# Patient Record
Sex: MALE | Race: BLACK OR AFRICAN AMERICAN | Employment: FULL TIME
[De-identification: names, ages, dates, MRNs, and addresses within clinical notes are randomized per-mention and may not be internally consistent; named-entity substitution may affect disease eponyms.]

---

## 2017-03-01 ENCOUNTER — OFFICE VISIT (OUTPATIENT)
Dept: INTERNAL MEDICINE | Facility: CLINIC | Age: 22
End: 2017-03-01

## 2017-03-01 VITALS
HEART RATE: 94 BPM | BODY MASS INDEX: 20.27 KG/M2 | SYSTOLIC BLOOD PRESSURE: 120 MMHG | HEIGHT: 70 IN | DIASTOLIC BLOOD PRESSURE: 80 MMHG | WEIGHT: 141.6 LBS

## 2017-03-01 DIAGNOSIS — R10.84 GENERALIZED ABDOMINAL PAIN: Primary | ICD-10-CM

## 2017-03-01 PROCEDURE — 99213 OFFICE O/P EST LOW 20 MIN: CPT | Performed by: INTERNAL MEDICINE

## 2017-03-01 RX ORDER — DICYCLOMINE HYDROCHLORIDE 10 MG/1
10 CAPSULE ORAL 4 TIMES DAILY PRN
Qty: 30 CAPSULE | Refills: 1 | Status: SHIPPED | OUTPATIENT
Start: 2017-03-01 | End: 2018-08-11

## 2017-06-01 ENCOUNTER — OFFICE VISIT (OUTPATIENT)
Dept: INTERNAL MEDICINE | Age: 22
End: 2017-06-01
Payer: COMMERCIAL

## 2017-06-01 VITALS
HEART RATE: 81 BPM | HEIGHT: 71 IN | DIASTOLIC BLOOD PRESSURE: 84 MMHG | SYSTOLIC BLOOD PRESSURE: 120 MMHG | BODY MASS INDEX: 18.62 KG/M2 | WEIGHT: 133 LBS

## 2017-06-01 DIAGNOSIS — F90.2 ATTENTION DEFICIT HYPERACTIVITY DISORDER (ADHD), COMBINED TYPE: ICD-10-CM

## 2017-06-01 DIAGNOSIS — L70.0 ACNE VULGARIS: Primary | ICD-10-CM

## 2017-06-01 PROCEDURE — 99212 OFFICE O/P EST SF 10 MIN: CPT

## 2017-06-01 PROCEDURE — 99213 OFFICE O/P EST LOW 20 MIN: CPT | Performed by: INTERNAL MEDICINE

## 2017-06-01 RX ORDER — CLINDAMYCIN AND BENZOYL PEROXIDE 10; 50 MG/G; MG/G
GEL TOPICAL
Qty: 10 G | Refills: 1 | Status: SHIPPED | OUTPATIENT
Start: 2017-06-01 | End: 2017-08-01 | Stop reason: ALTCHOICE

## 2017-06-01 RX ORDER — CLONIDINE HYDROCHLORIDE 0.2 MG/1
0.2 TABLET ORAL NIGHTLY
COMMUNITY
Start: 2017-05-15 | End: 2020-09-22

## 2017-06-06 ENCOUNTER — TELEPHONE (OUTPATIENT)
Dept: INTERNAL MEDICINE | Age: 22
End: 2017-06-06

## 2017-07-14 ENCOUNTER — OFFICE VISIT (OUTPATIENT)
Dept: INTERNAL MEDICINE | Age: 22
End: 2017-07-14
Payer: COMMERCIAL

## 2017-07-14 VITALS
HEART RATE: 68 BPM | DIASTOLIC BLOOD PRESSURE: 72 MMHG | TEMPERATURE: 98.6 F | WEIGHT: 136 LBS | HEIGHT: 71 IN | BODY MASS INDEX: 19.04 KG/M2 | SYSTOLIC BLOOD PRESSURE: 108 MMHG

## 2017-07-14 DIAGNOSIS — R21 RASH OF NECK: ICD-10-CM

## 2017-07-14 DIAGNOSIS — J06.9 ACUTE URI: Primary | ICD-10-CM

## 2017-07-14 PROCEDURE — 99213 OFFICE O/P EST LOW 20 MIN: CPT | Performed by: INTERNAL MEDICINE

## 2017-07-14 RX ORDER — DIPHENHYDRAMINE HYDROCHLORIDE, ZINC ACETATE 2; .1 G/100G; G/100G
CREAM TOPICAL 2 TIMES DAILY PRN
Qty: 1 TUBE | Refills: 0 | Status: SHIPPED | OUTPATIENT
Start: 2017-07-14 | End: 2020-03-03

## 2017-07-14 RX ORDER — AZITHROMYCIN 250 MG/1
TABLET, FILM COATED ORAL
Qty: 6 TABLET | Refills: 0 | Status: SHIPPED | OUTPATIENT
Start: 2017-07-14 | End: 2017-07-24

## 2017-07-18 ENCOUNTER — OFFICE VISIT (OUTPATIENT)
Dept: INTERNAL MEDICINE | Age: 22
End: 2017-07-18
Payer: COMMERCIAL

## 2017-07-18 VITALS
WEIGHT: 136 LBS | BODY MASS INDEX: 19.47 KG/M2 | SYSTOLIC BLOOD PRESSURE: 117 MMHG | TEMPERATURE: 99.4 F | DIASTOLIC BLOOD PRESSURE: 75 MMHG | HEART RATE: 58 BPM | HEIGHT: 70 IN

## 2017-07-18 DIAGNOSIS — J30.1 SEASONAL ALLERGIC RHINITIS DUE TO POLLEN: ICD-10-CM

## 2017-07-18 DIAGNOSIS — R11.14 BILIOUS VOMITING WITH NAUSEA: ICD-10-CM

## 2017-07-18 DIAGNOSIS — J01.10 ACUTE NON-RECURRENT FRONTAL SINUSITIS: ICD-10-CM

## 2017-07-18 DIAGNOSIS — J06.9 RECENT URI: Primary | ICD-10-CM

## 2017-07-18 PROCEDURE — 99213 OFFICE O/P EST LOW 20 MIN: CPT | Performed by: HOSPITALIST

## 2017-07-18 RX ORDER — ECHINACEA PURPUREA EXTRACT 125 MG
1 TABLET ORAL PRN
Qty: 1 BOTTLE | Refills: 3 | Status: SHIPPED | OUTPATIENT
Start: 2017-07-18 | End: 2020-03-03

## 2017-07-18 RX ORDER — FLUTICASONE PROPIONATE 50 MCG
1 SPRAY, SUSPENSION (ML) NASAL DAILY
Qty: 1 BOTTLE | Refills: 3 | Status: SHIPPED | OUTPATIENT
Start: 2017-07-18 | End: 2021-12-28 | Stop reason: SDUPTHER

## 2017-07-18 RX ORDER — AMOXICILLIN AND CLAVULANATE POTASSIUM 875; 125 MG/1; MG/1
1 TABLET, FILM COATED ORAL 2 TIMES DAILY
Qty: 14 TABLET | Refills: 0 | Status: SHIPPED | OUTPATIENT
Start: 2017-07-18 | End: 2017-07-25

## 2017-07-18 RX ORDER — ONDANSETRON 4 MG/1
4 TABLET, FILM COATED ORAL EVERY 8 HOURS PRN
Qty: 30 TABLET | Refills: 0 | Status: SHIPPED | OUTPATIENT
Start: 2017-07-18 | End: 2018-08-10

## 2017-07-18 RX ORDER — IBUPROFEN 400 MG/1
400 TABLET ORAL EVERY 6 HOURS PRN
Qty: 120 TABLET | Refills: 0 | Status: SHIPPED | OUTPATIENT
Start: 2017-07-18 | End: 2018-08-11

## 2017-08-01 ENCOUNTER — OFFICE VISIT (OUTPATIENT)
Dept: INTERNAL MEDICINE | Age: 22
End: 2017-08-01
Payer: COMMERCIAL

## 2017-08-01 VITALS
WEIGHT: 138 LBS | HEART RATE: 56 BPM | DIASTOLIC BLOOD PRESSURE: 63 MMHG | SYSTOLIC BLOOD PRESSURE: 94 MMHG | BODY MASS INDEX: 19.8 KG/M2
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
## 2017-08-01 PROCEDURE — 99213 OFFICE O/P EST LOW 20 MIN: CPT | Performed by: STUDENT IN AN ORGANIZED HEALTH CARE EDUCATION/TRAINING PROGRAM

## 2017-08-01 RX ORDER — DIPHENHYDRAMINE HCL 25 MG
25 TABLET ORAL 2 TIMES DAILY PRN
Qty: 10 TABLET | Refills: 0 | Status: SHIPPED | OUTPATIENT
Start: 2017-08-01 | End: 2018-08-11

## 2017-08-01 RX ORDER — DIAPER,BRIEF,INFANT-TODD,DISP
EACH MISCELLANEOUS
Qty: 1 TUBE | Refills: 0 | Status: SHIPPED | OUTPATIENT
Start: 2017-08-01 | End: 2017-08-08

## 2017-12-18 ENCOUNTER — TELEPHONE (OUTPATIENT)
Dept: INTERNAL MEDICINE | Age: 22
End: 2017-12-18

## 2018-04-12 ENCOUNTER — HOSPITAL ENCOUNTER (OUTPATIENT)
Age: 23
Setting detail: SPECIMEN
Discharge: HOME | End: 2018-04-12
Payer: COMMERCIAL

## 2018-04-12 ENCOUNTER — OFFICE VISIT (OUTPATIENT)
Dept: INTERNAL MEDICINE | Age: 23
End: 2018-04-12
Payer: COMMERCIAL

## 2018-04-12 VITALS
HEIGHT: 70 IN | DIASTOLIC BLOOD PRESSURE: 90 MMHG | WEIGHT: 143.4 LBS | SYSTOLIC BLOOD PRESSURE: 140 MMHG | BODY MASS INDEX: 20.53 KG/M2 | HEART RATE: 60 BPM

## 2018-04-12 DIAGNOSIS — R19.7 DIARRHEA, UNSPECIFIED TYPE: ICD-10-CM

## 2018-04-12 DIAGNOSIS — R10.33 PERIUMBILICAL ABDOMINAL PAIN: ICD-10-CM

## 2018-04-12 DIAGNOSIS — R10.33 PERIUMBILICAL ABDOMINAL PAIN: Primary | ICD-10-CM

## 2018-04-12 LAB
ALBUMIN SERPL-MCNC: 4.8 G/DL (ref 3.5–5.2)
ALBUMIN/GLOBULIN RATIO: 1.5 (ref 1–2.5)
ALP BLD-CCNC: 66 U/L (ref 40–129)
ALT SERPL-CCNC: 11 U/L (ref 5–41)
ANION GAP SERPL CALCULATED.3IONS-SCNC: 17 MMOL/L (ref 9–17)
AST SERPL-CCNC: 18 U/L
BILIRUB SERPL-MCNC: 0.45 MG/DL (ref 0.3–1.2)
BUN BLDV-MCNC: 10 MG/DL (ref 6–20)
BUN/CREAT BLD: ABNORMAL (ref 9–20)
CALCIUM SERPL-MCNC: 9.2 MG/DL (ref 8.6–10.4)
CHLORIDE BLD-SCNC: 99 MMOL/L (ref 98–107)
CO2: 22 MMOL/L (ref 20–31)
CREAT SERPL-MCNC: 0.92 MG/DL (ref 0.7–1.2)
GFR AFRICAN AMERICAN: >60 ML/MIN
GFR NON-AFRICAN AMERICAN: >60 ML/MIN
GFR SERPL CREATININE-BSD FRML MDRD: ABNORMAL ML/MIN/{1.73_M2}
GFR SERPL CREATININE-BSD FRML MDRD: ABNORMAL ML/MIN/{1.73_M2}
GLUCOSE BLD-MCNC: 69 MG/DL
GLUCOSE BLD-MCNC: 77 MG/DL (ref 70–99)
LIPASE: 22 U/L (ref 13–60)
POTASSIUM SERPL-SCNC: 3.3 MMOL/L (ref 3.7–5.3)
SODIUM BLD-SCNC: 138 MMOL/L (ref 135–144)
TOTAL PROTEIN: 7.9 G/DL (ref 6.4–8.3)

## 2018-04-12 PROCEDURE — G8420 CALC BMI NORM PARAMETERS: HCPCS | Performed by: HOSPITALIST

## 2018-04-12 PROCEDURE — 82962 GLUCOSE BLOOD TEST: CPT | Performed by: HOSPITALIST

## 2018-04-12 PROCEDURE — 36415 COLL VENOUS BLD VENIPUNCTURE: CPT

## 2018-04-12 PROCEDURE — 99213 OFFICE O/P EST LOW 20 MIN: CPT | Performed by: HOSPITALIST

## 2018-04-12 PROCEDURE — 83690 ASSAY OF LIPASE: CPT

## 2018-04-12 PROCEDURE — G8427 DOCREV CUR MEDS BY ELIG CLIN: HCPCS | Performed by: HOSPITALIST

## 2018-04-12 PROCEDURE — 99213 OFFICE O/P EST LOW 20 MIN: CPT

## 2018-04-12 PROCEDURE — 80053 COMPREHEN METABOLIC PANEL: CPT

## 2018-04-12 PROCEDURE — 1036F TOBACCO NON-USER: CPT | Performed by: HOSPITALIST

## 2018-04-12 RX ORDER — CIPROFLOXACIN 500 MG/1
500 TABLET, FILM COATED ORAL 2 TIMES DAILY
Qty: 14 TABLET | Refills: 0 | Status: CANCELLED | OUTPATIENT
Start: 2018-04-12 | End: 2018-04-19

## 2018-04-12 RX ORDER — GREEN TEA/HOODIA GORDONII 315-12.5MG
1 CAPSULE ORAL DAILY
Qty: 30 TABLET | Refills: 0 | Status: SHIPPED | OUTPATIENT
Start: 2018-04-12

## 2018-04-12 ASSESSMENT — PATIENT HEALTH QUESTIONNAIRE - PHQ9
1. LITTLE INTEREST OR PLEASURE IN DOING THINGS: 0
2. FEELING DOWN, DEPRESSED OR HOPELESS: 0
SUM OF ALL RESPONSES TO PHQ9 QUESTIONS 1 & 2: 0
SUM OF ALL RESPONSES TO PHQ QUESTIONS 1-9: 0

## 2018-04-13 ENCOUNTER — HOSPITAL ENCOUNTER (OUTPATIENT)
Age: 23
Setting detail: SPECIMEN
Discharge: HOME | End: 2018-04-13
Payer: COMMERCIAL

## 2018-04-13 LAB — LACTOFERRIN, QUAL: ABNORMAL

## 2018-04-13 PROCEDURE — 87493 C DIFF AMPLIFIED PROBE: CPT

## 2018-04-13 PROCEDURE — 83630 LACTOFERRIN FECAL (QUAL): CPT

## 2018-04-13 PROCEDURE — 87505 NFCT AGENT DETECTION GI: CPT

## 2018-04-14 LAB
C DIFFICILE TOXINS, PCR: NORMAL
SPECIMEN DESCRIPTION: NORMAL

## 2018-04-16 ENCOUNTER — TELEPHONE (OUTPATIENT)
Dept: INTERNAL MEDICINE | Age: 23
End: 2018-04-16

## 2018-04-16 LAB
CAMPYLOBACTER PCR: NORMAL
SALMONELLA PCR: NORMAL
SHIGATOXIN GENE PCR: NORMAL
SHIGELLA SP PCR: NORMAL
SPECIMEN: NORMAL

## 2018-07-24 ENCOUNTER — OFFICE VISIT (OUTPATIENT)
Dept: INTERNAL MEDICINE | Age: 23
End: 2018-07-24
Payer: COMMERCIAL

## 2018-07-24 VITALS
DIASTOLIC BLOOD PRESSURE: 64 MMHG | HEART RATE: 65 BPM | BODY MASS INDEX: 20.83 KG/M2 | SYSTOLIC BLOOD PRESSURE: 117 MMHG | WEIGHT: 145.2 LBS | TEMPERATURE: 98.3 F

## 2018-07-24 DIAGNOSIS — M25.561 ACUTE PAIN OF BOTH KNEES: Primary | ICD-10-CM

## 2018-07-24 DIAGNOSIS — J45.20 MILD INTERMITTENT ASTHMA WITHOUT COMPLICATION: ICD-10-CM

## 2018-07-24 DIAGNOSIS — M25.562 ACUTE PAIN OF BOTH KNEES: Primary | ICD-10-CM

## 2018-07-24 DIAGNOSIS — R30.0 DYSURIA: ICD-10-CM

## 2018-07-24 DIAGNOSIS — R51.9 NONINTRACTABLE HEADACHE, UNSPECIFIED CHRONICITY PATTERN, UNSPECIFIED HEADACHE TYPE: ICD-10-CM

## 2018-07-24 DIAGNOSIS — R10.9 ABDOMINAL PAIN, UNSPECIFIED ABDOMINAL LOCATION: ICD-10-CM

## 2018-07-24 PROCEDURE — 1036F TOBACCO NON-USER: CPT | Performed by: STUDENT IN AN ORGANIZED HEALTH CARE EDUCATION/TRAINING PROGRAM

## 2018-07-24 PROCEDURE — 99214 OFFICE O/P EST MOD 30 MIN: CPT | Performed by: STUDENT IN AN ORGANIZED HEALTH CARE EDUCATION/TRAINING PROGRAM

## 2018-07-24 PROCEDURE — 99213 OFFICE O/P EST LOW 20 MIN: CPT | Performed by: INTERNAL MEDICINE

## 2018-07-24 PROCEDURE — G8427 DOCREV CUR MEDS BY ELIG CLIN: HCPCS | Performed by: STUDENT IN AN ORGANIZED HEALTH CARE EDUCATION/TRAINING PROGRAM

## 2018-07-24 PROCEDURE — G8420 CALC BMI NORM PARAMETERS: HCPCS | Performed by: STUDENT IN AN ORGANIZED HEALTH CARE EDUCATION/TRAINING PROGRAM

## 2018-07-24 RX ORDER — PANTOPRAZOLE SODIUM 20 MG/1
20 TABLET, DELAYED RELEASE ORAL DAILY
Qty: 7 TABLET | Refills: 0 | Status: SHIPPED | OUTPATIENT
Start: 2018-07-24 | End: 2018-07-31 | Stop reason: SDUPTHER

## 2018-07-24 NOTE — PROGRESS NOTES
every other week. He has never used smokeless tobacco.  ETOH:   reports that he occasionally drinks alcohol. Takes about 2 cups of beer on weeekends  DRUGS:  reports that he does not use drugs. OCCUPATION:   at a [employer]. ALLERGIES:    No Known Allergies      HOME MEDICATION:      Current Outpatient Prescriptions on File Prior to Visit   Medication Sig Dispense Refill    diphenhydrAMINE (BENADRYL) 25 MG tablet Take 1 tablet by mouth 2 times daily as needed for Itching 10 tablet 0    fluticasone (FLONASE) 50 MCG/ACT nasal spray 1 spray by Nasal route daily 1 Bottle 3    ibuprofen (ADVIL;MOTRIN) 400 MG tablet Take 1 tablet by mouth every 6 hours as needed for Pain 120 tablet 0    cloNIDine (CATAPRES) 0.2 MG tablet Take 0.2 mg by mouth nightly      benzoyl peroxide 5 % gel Apply topically daily. 60 g 0    amphetamine-dextroamphetamine (ADDERALL XR) 20 MG extended release capsule Take 1 capsule by mouth every morning      Omega-3 Fatty Acids (FISH OIL) 1000 MG CAPS       Lactobacillus (PROBIOTIC ACIDOPHILUS) TABS Take 1 tablet by mouth daily 30 tablet 0    sodium chloride (OCEAN) 0.65 % nasal spray 1 spray by Nasal route as needed for Congestion 1 Bottle 3    ondansetron (ZOFRAN) 4 MG tablet Take 1 tablet by mouth every 8 hours as needed for Nausea or Vomiting 30 tablet 0    diphenhydrAMINE-zinc acetate (BENADRYL EXTRA STRENGTH) 2-0.1 % cream Apply topically 2 times daily as needed for Itching 1 Tube 0    dicyclomine (BENTYL) 10 MG capsule Take 1 capsule by mouth 4 times daily as needed (abdominal pain) 30 capsule 1     No current facility-administered medications on file prior to visit.         FAMILY HISTORY:      Family History   Problem Relation Age of Onset    Asthma Mother     High Blood Pressure Mother     Asthma Maternal Grandmother     Heart Disease Maternal Grandmother     High Blood Pressure Maternal Grandmother     Arthritis Neg Hx     Birth Defects Neg Hx     Cancer Neg Hx     Depression Neg Hx     Diabetes Neg Hx     Early Death Neg Hx     Hearing Loss Neg Hx     Kidney Disease Neg Hx     High Cholesterol Neg Hx     Learning Disabilities Neg Hx     Mental Illness Neg Hx     Mental Retardation Neg Hx     Miscarriages / Stillbirths Neg Hx     Stroke Neg Hx     Substance Abuse Neg Hx     Vision Loss Neg Hx        REVIEW OF SYSTEMS:    · General: Fever and chills. No significant weight changes. · Dermatological: facial and abdominal rash  · Ears/Nose/Throat: denies any hearing loss, abnormal smelling or throat pain  · Respiratory: no cough, pleuritic chest pain, dyspnea, or wheezing  · Cardiovascular: no pain, dyspnea on exertion, orthopnea, palpitations, or claudication  · Gastrointestinal:  Nausea and vomiting, no heartburn, no diarrhea, no constipation, bloating, and sharp constant abdominal pain. No bloody or black stools. · Genito-Urinary: no urinary urgency, no frequency, dysuria, nocturia, hesitancy, incontinence, or pain. No hematuria  · Musculoskeletal: bilateral knee arthralgia, myalgia, weakness, or morning stiffness  · Neurologic: no TIA or stroke symptoms. no paralysis, or frequent or severe headaches  · Hematologic/Lymphatic/Immunologic: no abnormal bleeding/bruising, night sweats orswollen glands.   · Endocrine: no heat or cold intolerance and no polyuria        PHYSICAL EXAM:      Vitals:    07/24/18 1457   BP: 117/64   Site: Right Arm   Position: Sitting   Cuff Size: Small Adult   Pulse: 65   Weight: 145 lb 3.2 oz (65.9 kg)     General appearance - alert, well appearing, and in no distress, oriented to person, place, and time, normal appearing weight and well hydrated  Mental status - normal mood, behavior, speech, dress, motor activity, and thought processes  Eyes - pupils equal and reactive, extraocular eye movements intact  Ears - bilateral TM's and external ear canals normal. Cerumen in both ears R>L  Nose - normal and patent, no erythema, discharge or polyps  Mouth - mucous membranes moist, pharynx normal without lesions  Neck - supple, no significant adenopathy  Lymphatics - no palpable lymphadenopathy, no hepatosplenomegaly  Chest - clear to auscultation, no wheezes, rales or rhonchi, symmetric air entry  Heart - normal rate, regular rhythm, normal S1, S2, no murmurs, rubs, clicks or gallops  Abdomen - soft, nontender, nondistended, no masses or organomegaly  Neurological - alert, oriented, normal speech, no focal findings or movement disorder noted  Musculoskeletal - bilateral knee joint tenderness over patella. ROM normal. Slight limitation by pain  Extremities - peripheral pulses normal, no pedal edema, no clubbing or cyanosis  Skin - sparse papular rash on face and abdomen. LABORATORY FINDINGS:    CBC:   Lab Results   Component Value Date    WBC 3.1 10/11/2016    HGB 13.9 10/11/2016     10/11/2016     2011     BMP:    Lab Results   Component Value Date     2018    K 3.3 2018    CL 99 2018    CO2 22 2018    BUN 10 2018    CREATININE 0.92 2018    GLUCOSE 77 2018    GLUCOSE 72 2011     Hemoglobin A1C: No results found for: LABA1C  Lipid profile:   Lab Results   Component Value Date    CHOL 122 2011    TRIG 65 2011    HDL 36 2011     Thyroid functions:   Lab Results   Component Value Date    TSH 1.15 2012      Hepatic functions:   Lab Results   Component Value Date    ALT 11 2018    AST 18 2018    PROT 7.9 2018    BILITOT 0.45 2018    LABALBU 4.8 2018    LABALBU 4.6 2011     ASSESSMENT AND PLAN:   1. Abdominal pain  · Acute gastritis- PPI  · Possible UTI- Urinalysis, urine culture    2. Bilateral knee pain  · CBC    3. Skin rash  · Hydrocortisone cream 1%    INSTRUCTIONS:   · No Follow-up on file.     · Ally received counseling on the following healthy behaviors: {Health Counselin    · Reviewed prior labs

## 2018-07-25 ENCOUNTER — HOSPITAL ENCOUNTER (OUTPATIENT)
Age: 23
Setting detail: SPECIMEN
Discharge: HOME | End: 2018-07-25
Payer: COMMERCIAL

## 2018-07-25 DIAGNOSIS — R30.0 DYSURIA: ICD-10-CM

## 2018-07-25 DIAGNOSIS — M25.561 ACUTE PAIN OF BOTH KNEES: ICD-10-CM

## 2018-07-25 DIAGNOSIS — M25.562 ACUTE PAIN OF BOTH KNEES: ICD-10-CM

## 2018-07-25 DIAGNOSIS — R10.9 ABDOMINAL PAIN, UNSPECIFIED ABDOMINAL LOCATION: ICD-10-CM

## 2018-07-25 DIAGNOSIS — R51.9 NONINTRACTABLE HEADACHE, UNSPECIFIED CHRONICITY PATTERN, UNSPECIFIED HEADACHE TYPE: ICD-10-CM

## 2018-07-25 LAB
-: NORMAL
ABSOLUTE EOS #: 0.04 K/UL (ref 0–0.44)
ABSOLUTE IMMATURE GRANULOCYTE: <0.03 K/UL (ref 0–0.3)
ABSOLUTE LYMPH #: 0.78 K/UL (ref 1.1–3.7)
ABSOLUTE MONO #: 0.31 K/UL (ref 0.1–1.2)
ALBUMIN SERPL-MCNC: 4.6 G/DL (ref 3.5–5.2)
ALBUMIN/GLOBULIN RATIO: 1.4 (ref 1–2.5)
ALP BLD-CCNC: 66 U/L (ref 40–129)
ALT SERPL-CCNC: 23 U/L (ref 5–41)
AMORPHOUS: NORMAL
AMYLASE: 163 U/L (ref 28–100)
ANION GAP SERPL CALCULATED.3IONS-SCNC: 14 MMOL/L (ref 9–17)
AST SERPL-CCNC: 28 U/L
BACTERIA: NORMAL
BASOPHILS # BLD: 1 % (ref 0–2)
BASOPHILS ABSOLUTE: <0.03 K/UL (ref 0–0.2)
BILIRUB SERPL-MCNC: 0.69 MG/DL (ref 0.3–1.2)
BILIRUBIN DIRECT: 0.16 MG/DL
BILIRUBIN URINE: NEGATIVE
BILIRUBIN, INDIRECT: 0.53 MG/DL (ref 0–1)
BUN BLDV-MCNC: 10 MG/DL (ref 6–20)
BUN/CREAT BLD: NORMAL (ref 9–20)
CALCIUM SERPL-MCNC: 9.4 MG/DL (ref 8.6–10.4)
CASTS UA: NORMAL /LPF (ref 0–8)
CHLORIDE BLD-SCNC: 103 MMOL/L (ref 98–107)
CO2: 23 MMOL/L (ref 20–31)
COLOR: YELLOW
CREAT SERPL-MCNC: 1.04 MG/DL (ref 0.7–1.2)
CRYSTALS, UA: NORMAL /HPF
DIFFERENTIAL TYPE: ABNORMAL
EOSINOPHILS RELATIVE PERCENT: 2 % (ref 1–4)
EPITHELIAL CELLS UA: NORMAL /HPF (ref 0–5)
GFR AFRICAN AMERICAN: >60 ML/MIN
GFR NON-AFRICAN AMERICAN: >60 ML/MIN
GFR SERPL CREATININE-BSD FRML MDRD: NORMAL ML/MIN/{1.73_M2}
GFR SERPL CREATININE-BSD FRML MDRD: NORMAL ML/MIN/{1.73_M2}
GLOBULIN: NORMAL G/DL (ref 1.5–3.8)
GLUCOSE BLD-MCNC: 91 MG/DL (ref 70–99)
GLUCOSE URINE: NEGATIVE
HCT VFR BLD CALC: 42.4 % (ref 40.7–50.3)
HEMOGLOBIN: 13.2 G/DL (ref 13–17)
HIV AG/AB: NONREACTIVE
IMMATURE GRANULOCYTES: 0 %
KETONES, URINE: NEGATIVE
LEUKOCYTE ESTERASE, URINE: NEGATIVE
LYMPHOCYTES # BLD: 29 % (ref 24–43)
MCH RBC QN AUTO: 28.9 PG (ref 25.2–33.5)
MCHC RBC AUTO-ENTMCNC: 31.1 G/DL (ref 28.4–34.8)
MCV RBC AUTO: 93 FL (ref 82.6–102.9)
MONOCYTES # BLD: 11 % (ref 3–12)
MUCUS: NORMAL
NITRITE, URINE: NEGATIVE
NRBC AUTOMATED: 0 PER 100 WBC
OTHER OBSERVATIONS UA: NORMAL
PDW BLD-RTO: 12.5 % (ref 11.8–14.4)
PH UA: 7.5 (ref 5–8)
PLATELET # BLD: 281 K/UL (ref 138–453)
PLATELET ESTIMATE: ABNORMAL
PMV BLD AUTO: 9.6 FL (ref 8.1–13.5)
POTASSIUM SERPL-SCNC: 4.2 MMOL/L (ref 3.7–5.3)
PROTEIN UA: NEGATIVE
RBC # BLD: 4.56 M/UL (ref 4.21–5.77)
RBC # BLD: ABNORMAL 10*6/UL
RBC UA: NORMAL /HPF (ref 0–4)
RENAL EPITHELIAL, UA: NORMAL /HPF
SEDIMENTATION RATE, ERYTHROCYTE: 2 MM (ref 0–10)
SEG NEUTROPHILS: 57 % (ref 36–65)
SEGMENTED NEUTROPHILS ABSOLUTE COUNT: 1.55 K/UL (ref 1.5–8.1)
SODIUM BLD-SCNC: 140 MMOL/L (ref 135–144)
SPECIFIC GRAVITY UA: 1.01 (ref 1–1.03)
T. PALLIDUM, IGG: NONREACTIVE
TOTAL PROTEIN: 7.8 G/DL (ref 6.4–8.3)
TRICHOMONAS: NORMAL
TURBIDITY: CLEAR
URINE HGB: NEGATIVE
UROBILINOGEN, URINE: NORMAL
WBC # BLD: 2.7 K/UL (ref 3.5–11.3)
WBC # BLD: ABNORMAL 10*3/UL
WBC UA: NORMAL /HPF (ref 0–5)
YEAST: NORMAL

## 2018-07-25 PROCEDURE — 36415 COLL VENOUS BLD VENIPUNCTURE: CPT

## 2018-07-25 PROCEDURE — 87491 CHLMYD TRACH DNA AMP PROBE: CPT

## 2018-07-25 PROCEDURE — 87591 N.GONORRHOEAE DNA AMP PROB: CPT

## 2018-07-25 PROCEDURE — 85651 RBC SED RATE NONAUTOMATED: CPT

## 2018-07-25 PROCEDURE — 81001 URINALYSIS AUTO W/SCOPE: CPT

## 2018-07-25 PROCEDURE — 80048 BASIC METABOLIC PNL TOTAL CA: CPT

## 2018-07-25 PROCEDURE — 82150 ASSAY OF AMYLASE: CPT

## 2018-07-25 PROCEDURE — 87389 HIV-1 AG W/HIV-1&-2 AB AG IA: CPT

## 2018-07-25 PROCEDURE — 85025 COMPLETE CBC W/AUTO DIFF WBC: CPT

## 2018-07-25 PROCEDURE — 80076 HEPATIC FUNCTION PANEL: CPT

## 2018-07-25 PROCEDURE — 86780 TREPONEMA PALLIDUM: CPT

## 2018-07-25 PROCEDURE — 87086 URINE CULTURE/COLONY COUNT: CPT

## 2018-07-26 LAB
C. TRACHOMATIS DNA ,URINE: NEGATIVE
N. GONORRHOEAE DNA, URINE: NEGATIVE

## 2018-07-27 LAB
CULTURE: NORMAL
Lab: NORMAL
SPECIMEN DESCRIPTION: NORMAL
STATUS: NORMAL

## 2018-07-31 ENCOUNTER — OFFICE VISIT (OUTPATIENT)
Dept: INTERNAL MEDICINE | Age: 23
End: 2018-07-31
Payer: COMMERCIAL

## 2018-07-31 ENCOUNTER — HOSPITAL ENCOUNTER (OUTPATIENT)
Age: 23
Setting detail: SPECIMEN
Discharge: HOME | End: 2018-07-31
Payer: COMMERCIAL

## 2018-07-31 VITALS
HEART RATE: 59 BPM | BODY MASS INDEX: 19.6 KG/M2 | DIASTOLIC BLOOD PRESSURE: 76 MMHG | SYSTOLIC BLOOD PRESSURE: 127 MMHG | WEIGHT: 136.6 LBS

## 2018-07-31 DIAGNOSIS — R10.9 ABDOMINAL PAIN, UNSPECIFIED ABDOMINAL LOCATION: ICD-10-CM

## 2018-07-31 LAB
AMYLASE: 165 U/L (ref 28–100)
LIPASE: 22 U/L (ref 13–60)

## 2018-07-31 PROCEDURE — 1036F TOBACCO NON-USER: CPT | Performed by: STUDENT IN AN ORGANIZED HEALTH CARE EDUCATION/TRAINING PROGRAM

## 2018-07-31 PROCEDURE — 83690 ASSAY OF LIPASE: CPT

## 2018-07-31 PROCEDURE — 99214 OFFICE O/P EST MOD 30 MIN: CPT | Performed by: STUDENT IN AN ORGANIZED HEALTH CARE EDUCATION/TRAINING PROGRAM

## 2018-07-31 PROCEDURE — G8420 CALC BMI NORM PARAMETERS: HCPCS | Performed by: STUDENT IN AN ORGANIZED HEALTH CARE EDUCATION/TRAINING PROGRAM

## 2018-07-31 PROCEDURE — 36415 COLL VENOUS BLD VENIPUNCTURE: CPT

## 2018-07-31 PROCEDURE — G8427 DOCREV CUR MEDS BY ELIG CLIN: HCPCS | Performed by: STUDENT IN AN ORGANIZED HEALTH CARE EDUCATION/TRAINING PROGRAM

## 2018-07-31 PROCEDURE — 82150 ASSAY OF AMYLASE: CPT

## 2018-07-31 PROCEDURE — 99213 OFFICE O/P EST LOW 20 MIN: CPT | Performed by: INTERNAL MEDICINE

## 2018-07-31 RX ORDER — PANTOPRAZOLE SODIUM 20 MG/1
20 TABLET, DELAYED RELEASE ORAL DAILY
Qty: 14 TABLET | Refills: 0 | Status: SHIPPED | OUTPATIENT
Start: 2018-07-31 | End: 2020-09-22

## 2018-07-31 NOTE — PROGRESS NOTES
[facility]    Progress Note    Date of patient's visit: 7/31/2018  YOB: 1995  Patient's Name:  Aj Alcocer    Patient Care Team:  Josie Galvez MD as PCP - Leyda Dominguez MD as PCP - [facility] Attributed Provider    REASON FOR VISIT: Routine outpatient follow     HISTORY OF PRESENT ILLNESS:    History was obtained from the patient. Aj Alcocer is a 26 y.o. is here for a follow up visit for abdominal pain, bilateral knee pain and fever. He  reports that the abdominal pain has improved somewhat, although he still feels it intermittently. He also states that the fever has resolved and that the knee pain is better but not gone 100%. He presently complains of weight loss which has been ongoing for about a month. He reports a reduction in appetite associated with excessive sleep, loss of energy, but denies loss of interest in his hobbies. He admits to having palpitations and heat intolerance with night sweats. He denies watery stools, cough, chest pain, or sick contacts. His ordered labs from last week show a markedly elevated amylase level.      Patient Active Problem List   Diagnosis    ADHD (attention deficit hyperactivity disorder)    Seasonal allergic rhinitis due to pollen    Mild intermittent asthma without complication       ALLERGIES    No Known Allergies      MEDICATIONS:      Current Outpatient Prescriptions on File Prior to Visit   Medication Sig Dispense Refill    pantoprazole (PROTONIX) 20 MG tablet Take 1 tablet by mouth daily for 7 days 7 tablet 0    Lactobacillus (PROBIOTIC ACIDOPHILUS) TABS Take 1 tablet by mouth daily 30 tablet 0    diphenhydrAMINE (BENADRYL) 25 MG tablet Take 1 tablet by mouth 2 times daily as needed for Itching 10 tablet 0    sodium chloride (OCEAN) 0.65 % nasal spray 1 spray by Nasal route as needed for Congestion 1 Bottle 3    fluticasone (FLONASE) 50 MCG/ACT nasal spray 1 spray by Nasal adenopathy  Chest - clear to auscultation, no wheezes, rales or rhonchi, symmetric air entry  Heart - normal rate, regular rhythm, normal S1, S2, no murmurs, rubs, clicks or gallops  Abdomen - soft, nontender, nondistended, no masses or organomegaly  Musculoskeletal - no joint tenderness, deformity or swelling, full ROM without pain  Extremities - peripheral pulses normal, no pedal edema, no clubbing or cyanosis  Skin - normal coloration and turgor, no rashes, no suspicious skin lesions noted    LABORATORY FINDINGS:    CBC:  Lab Results   Component Value Date    WBC 2.7 07/25/2018    HGB 13.2 07/25/2018     07/25/2018     11/16/2011     BMP:    Lab Results   Component Value Date     07/25/2018    K 4.2 07/25/2018     07/25/2018    CO2 23 07/25/2018    BUN 10 07/25/2018    CREATININE 1.04 07/25/2018    GLUCOSE 91 07/25/2018    GLUCOSE 72 11/16/2011     HEMOGLOBIN A1C: No results found for: LABA1C  MICROALBUMIN URINE: No results found for: MICROALBUR  FASTING LIPID PANEL:  Lab Results   Component Value Date    CHOL 122 11/16/2011    HDL 36 (L) 11/16/2011    TRIG 65 11/16/2011     LIVER PROFILE:  Lab Results   Component Value Date    ALT 23 07/25/2018    AST 28 07/25/2018    PROT 7.8 07/25/2018    BILITOT 0.69 07/25/2018    BILIDIR 0.16 07/25/2018    LABALBU 4.6 07/25/2018    LABALBU 4.6 11/16/2011      THYROID FUNCTION:   Lab Results   Component Value Date    TSH 1.15 07/12/2012      URINE ANALYSIS: No results found for: LABURIN  ASSESSMENT AND PLAN:    1. Abdominal pain -  ·  Likely secondary to pancreatitis. Amylase elevated  ·  on quitting alcohol. 2. Weight loss, palpitations, heat intolerance -   · Possible hyperthyroidism  · TSH      3. Bilateral knee pain -  · Likely secondary to resolving viral infection    4. Depression -  ·         FOLLOW UP:       1. Ally received counseling on the following healthy behaviors: decrease in alcohol consumption  2.   Patient given educational materials - see patient instructions  3. Discussed use, benefit, and side effects of prescribed medications. Barriers to medication compliance addressed. All patient questions answered. Pt voiced understanding. 4.  Reviewed prior labs and health maintenance  5. Continue current medications, diet and exercise.       Patricia Jeronimo MD  PGY-1 Resident  Internal Medicine  [address] [facility]    7/31/2018, 2:12 PM

## 2018-07-31 NOTE — PROGRESS NOTES
Visit Information    Have you changed or started any medications since your last visit including any over-the-counter medicines, vitamins, or herbal medicines? no   Have you stopped taking any of your medications? Is so, why? -  no  Are you having any side effects from any of your medications? - no    Have you seen any other physician or provider since your last visit?  no   Have you had any other diagnostic tests since your last visit? yes    Have you been seen in the emergency room and/or had an admission in a hospital since we last saw you?  no   Have you had your routine dental cleaning in the past 6 months?  no     Do you have an active MyChart account? If no, what is the barrier?   Yes    Patient Care Team:  Jacky Wadsworth MD as PCP - Tunde Pizano MD as PCP - [facility] Attributed Provider    Medical History Review  Past Medical, Family, and Social History reviewed and does contribute to the patient presenting condition    Health Maintenance   Topic Date Due    DTaP/Tdap/Td vaccine (6 - Td) 08/18/2018    Flu vaccine (1) 09/01/2018    Meningococcal (MCV) Vaccine Age 0-22 Years  Completed    Pneumococcal med risk  Completed    HIV screen  Completed

## 2018-07-31 NOTE — PATIENT INSTRUCTIONS
Return To Clinic 8/14/18. After Visit Summary given and reviewed. bb    It is very important for your care that you keep your appointment. If for some reason you are unable to keep your appointment it is equally important that you call our office at [phone] to cancel your appointment and reschedule. Failure to do so may result in your termination from our practice. LABORATORY INSTRUCTIONS    Your doctor has ordered blood or urine testing. You can get this testing done at the Lab located on the first floor of the [facility], or at any other [facility]. Please stop at Main Registration, before going to the lab, as you must be registered first.     Please get this lab done before your next visit.     You may eat or drink before this test.

## 2018-08-10 ENCOUNTER — HOSPITAL ENCOUNTER (EMERGENCY)
Age: 23
Discharge: HOME | End: 2018-08-10
Attending: EMERGENCY MEDICINE
Payer: COMMERCIAL

## 2018-08-10 VITALS
RESPIRATION RATE: 15 BRPM | DIASTOLIC BLOOD PRESSURE: 73 MMHG | TEMPERATURE: 100.2 F | BODY MASS INDEX: 20.95 KG/M2 | WEIGHT: 146 LBS | SYSTOLIC BLOOD PRESSURE: 115 MMHG | OXYGEN SATURATION: 97 % | HEART RATE: 94 BPM

## 2018-08-10 DIAGNOSIS — R19.7 NAUSEA VOMITING AND DIARRHEA: ICD-10-CM

## 2018-08-10 DIAGNOSIS — R11.2 NAUSEA VOMITING AND DIARRHEA: ICD-10-CM

## 2018-08-10 DIAGNOSIS — R10.84 GENERALIZED ABDOMINAL PAIN: Primary | ICD-10-CM

## 2018-08-10 DIAGNOSIS — K52.9 GASTROENTERITIS: ICD-10-CM

## 2018-08-10 LAB
ABSOLUTE EOS #: <0.03 K/UL (ref 0–0.44)
ABSOLUTE IMMATURE GRANULOCYTE: <0.03 K/UL (ref 0–0.3)
ABSOLUTE LYMPH #: 0.77 K/UL (ref 1.1–3.7)
ABSOLUTE MONO #: 0.69 K/UL (ref 0.1–1.2)
ALBUMIN SERPL-MCNC: 4.5 G/DL (ref 3.5–5.2)
ALBUMIN/GLOBULIN RATIO: 1.4 (ref 1–2.5)
ALP BLD-CCNC: 68 U/L (ref 40–129)
ALT SERPL-CCNC: 11 U/L (ref 5–41)
ANION GAP SERPL CALCULATED.3IONS-SCNC: 14 MMOL/L (ref 9–17)
AST SERPL-CCNC: 27 U/L
BASOPHILS # BLD: 0 % (ref 0–2)
BASOPHILS ABSOLUTE: <0.03 K/UL (ref 0–0.2)
BILIRUB SERPL-MCNC: 0.83 MG/DL (ref 0.3–1.2)
BUN BLDV-MCNC: 10 MG/DL (ref 6–20)
BUN/CREAT BLD: ABNORMAL (ref 9–20)
CALCIUM SERPL-MCNC: 8.9 MG/DL (ref 8.6–10.4)
CHLORIDE BLD-SCNC: 104 MMOL/L (ref 98–107)
CO2: 23 MMOL/L (ref 20–31)
CREAT SERPL-MCNC: 0.99 MG/DL (ref 0.7–1.2)
CULTURE: NORMAL
DIFFERENTIAL TYPE: ABNORMAL
EOSINOPHILS RELATIVE PERCENT: 0 % (ref 1–4)
GFR AFRICAN AMERICAN: >60 ML/MIN
GFR NON-AFRICAN AMERICAN: >60 ML/MIN
GFR SERPL CREATININE-BSD FRML MDRD: ABNORMAL ML/MIN/{1.73_M2}
GFR SERPL CREATININE-BSD FRML MDRD: ABNORMAL ML/MIN/{1.73_M2}
GLUCOSE BLD-MCNC: 100 MG/DL (ref 70–99)
HCT VFR BLD CALC: 42.1 % (ref 40.7–50.3)
HEMOGLOBIN: 13.6 G/DL (ref 13–17)
IMMATURE GRANULOCYTES: 0 %
LIPASE: 21 U/L (ref 13–60)
LYMPHOCYTES # BLD: 12 % (ref 24–43)
Lab: NORMAL
MCH RBC QN AUTO: 29.5 PG (ref 25.2–33.5)
MCHC RBC AUTO-ENTMCNC: 32.3 G/DL (ref 28.4–34.8)
MCV RBC AUTO: 91.3 FL (ref 82.6–102.9)
MONOCYTES # BLD: 11 % (ref 3–12)
NRBC AUTOMATED: 0 PER 100 WBC
PDW BLD-RTO: 12 % (ref 11.8–14.4)
PLATELET # BLD: 218 K/UL (ref 138–453)
PLATELET ESTIMATE: ABNORMAL
PMV BLD AUTO: 9.5 FL (ref 8.1–13.5)
POTASSIUM SERPL-SCNC: 3.5 MMOL/L (ref 3.7–5.3)
RBC # BLD: 4.61 M/UL (ref 4.21–5.77)
RBC # BLD: ABNORMAL 10*6/UL
SEG NEUTROPHILS: 77 % (ref 36–65)
SEGMENTED NEUTROPHILS ABSOLUTE COUNT: 4.95 K/UL (ref 1.5–8.1)
SODIUM BLD-SCNC: 141 MMOL/L (ref 135–144)
SPECIMEN DESCRIPTION: NORMAL
STATUS: NORMAL
TOTAL PROTEIN: 7.7 G/DL (ref 6.4–8.3)
WBC # BLD: 6.5 K/UL (ref 3.5–11.3)
WBC # BLD: ABNORMAL 10*3/UL

## 2018-08-10 PROCEDURE — 99284 EMERGENCY DEPT VISIT MOD MDM: CPT

## 2018-08-10 PROCEDURE — 80053 COMPREHEN METABOLIC PANEL: CPT

## 2018-08-10 PROCEDURE — 83690 ASSAY OF LIPASE: CPT

## 2018-08-10 PROCEDURE — 2580000003 HC RX 258: Performed by: EMERGENCY MEDICINE

## 2018-08-10 PROCEDURE — 96374 THER/PROPH/DIAG INJ IV PUSH: CPT

## 2018-08-10 PROCEDURE — 6360000002 HC RX W HCPCS: Performed by: EMERGENCY MEDICINE

## 2018-08-10 PROCEDURE — C9113 INJ PANTOPRAZOLE SODIUM, VIA: HCPCS | Performed by: EMERGENCY MEDICINE

## 2018-08-10 PROCEDURE — 85025 COMPLETE CBC W/AUTO DIFF WBC: CPT

## 2018-08-10 RX ORDER — ONDANSETRON 4 MG/1
4 TABLET, FILM COATED ORAL EVERY 8 HOURS PRN
Qty: 10 TABLET | Refills: 0 | Status: SHIPPED | OUTPATIENT
Start: 2018-08-10 | End: 2020-09-22

## 2018-08-10 RX ORDER — ONDANSETRON 2 MG/ML
4 INJECTION INTRAMUSCULAR; INTRAVENOUS ONCE
Status: COMPLETED | OUTPATIENT
Start: 2018-08-10 | End: 2018-08-10

## 2018-08-10 RX ORDER — PANTOPRAZOLE SODIUM 40 MG/10ML
40 INJECTION, POWDER, LYOPHILIZED, FOR SOLUTION INTRAVENOUS ONCE
Status: COMPLETED | OUTPATIENT
Start: 2018-08-10 | End: 2018-08-10

## 2018-08-10 RX ORDER — KETOROLAC TROMETHAMINE 30 MG/ML
30 INJECTION, SOLUTION INTRAMUSCULAR; INTRAVENOUS ONCE
Status: COMPLETED | OUTPATIENT
Start: 2018-08-10 | End: 2018-08-10

## 2018-08-10 RX ORDER — DICYCLOMINE HYDROCHLORIDE 10 MG/1
10 CAPSULE ORAL EVERY 6 HOURS PRN
Qty: 20 CAPSULE | Refills: 0 | Status: SHIPPED | OUTPATIENT
Start: 2018-08-10 | End: 2018-08-11

## 2018-08-10 RX ORDER — IBUPROFEN 800 MG/1
800 TABLET ORAL EVERY 8 HOURS PRN
Qty: 30 TABLET | Refills: 0 | Status: SHIPPED | OUTPATIENT
Start: 2018-08-10 | End: 2019-03-08

## 2018-08-10 RX ORDER — 0.9 % SODIUM CHLORIDE 0.9 %
10 VIAL (ML) INJECTION ONCE
Status: COMPLETED | OUTPATIENT
Start: 2018-08-10 | End: 2018-08-10

## 2018-08-10 RX ORDER — 0.9 % SODIUM CHLORIDE 0.9 %
1000 INTRAVENOUS SOLUTION INTRAVENOUS ONCE
Status: COMPLETED | OUTPATIENT
Start: 2018-08-10 | End: 2018-08-10

## 2018-08-10 RX ADMIN — ONDANSETRON 4 MG: 2 INJECTION INTRAMUSCULAR; INTRAVENOUS at 09:52

## 2018-08-10 RX ADMIN — SODIUM CHLORIDE 1000 ML: 9 INJECTION, SOLUTION INTRAVENOUS at 09:53

## 2018-08-10 RX ADMIN — Medication 10 ML: at 09:52

## 2018-08-10 RX ADMIN — KETOROLAC TROMETHAMINE 30 MG: 30 INJECTION, SOLUTION INTRAMUSCULAR at 09:52

## 2018-08-10 RX ADMIN — PANTOPRAZOLE SODIUM 40 MG: 40 INJECTION, POWDER, FOR SOLUTION INTRAVENOUS at 09:52

## 2018-08-10 ASSESSMENT — PAIN DESCRIPTION - PAIN TYPE: TYPE: ACUTE PAIN

## 2018-08-10 ASSESSMENT — PAIN DESCRIPTION - DESCRIPTORS: DESCRIPTORS: ACHING

## 2018-08-10 ASSESSMENT — PAIN DESCRIPTION - LOCATION: LOCATION: ABDOMEN

## 2018-08-10 ASSESSMENT — PAIN DESCRIPTION - PROGRESSION: CLINICAL_PROGRESSION: GRADUALLY WORSENING

## 2018-08-10 ASSESSMENT — PAIN DESCRIPTION - ONSET: ONSET: SUDDEN

## 2018-08-10 ASSESSMENT — PAIN SCALES - GENERAL
PAINLEVEL_OUTOF10: 10
PAINLEVEL_OUTOF10: 10

## 2018-08-11 ENCOUNTER — HOSPITAL ENCOUNTER (EMERGENCY)
Age: 23
Discharge: HOME | End: 2018-08-11
Attending: EMERGENCY MEDICINE
Payer: COMMERCIAL

## 2018-08-11 VITALS
RESPIRATION RATE: 16 BRPM | HEART RATE: 52 BPM | OXYGEN SATURATION: 100 % | TEMPERATURE: 96.8 F | SYSTOLIC BLOOD PRESSURE: 106 MMHG | DIASTOLIC BLOOD PRESSURE: 68 MMHG | BODY MASS INDEX: 20.3 KG/M2 | WEIGHT: 145 LBS | HEIGHT: 71 IN
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
## 2018-08-11 PROCEDURE — 6370000000 HC RX 637 (ALT 250 FOR IP): Performed by: EMERGENCY MEDICINE

## 2018-08-11 PROCEDURE — 99282 EMERGENCY DEPT VISIT SF MDM: CPT

## 2018-08-11 RX ORDER — IBUPROFEN 800 MG/1
800 TABLET ORAL ONCE
Status: COMPLETED | OUTPATIENT
Start: 2018-08-11 | End: 2018-08-11

## 2018-08-11 RX ORDER — DIPHENHYDRAMINE HCL 25 MG
25 TABLET ORAL EVERY 6 HOURS PRN
Qty: 30 TABLET | Refills: 0 | Status: SHIPPED | OUTPATIENT
Start: 2018-08-11

## 2018-08-11 RX ORDER — DIPHENHYDRAMINE HCL 25 MG
25 TABLET ORAL ONCE
Status: COMPLETED | OUTPATIENT
Start: 2018-08-11 | End: 2018-08-11

## 2018-08-11 RX ADMIN — DIPHENHYDRAMINE HCL 25 MG: 25 TABLET ORAL at 14:18

## 2018-08-11 RX ADMIN — IBUPROFEN 800 MG: 800 TABLET ORAL at 14:18

## 2018-08-11 ASSESSMENT — PAIN SCALES - GENERAL
PAINLEVEL_OUTOF10: 5
PAINLEVEL_OUTOF10: 10

## 2018-08-11 ASSESSMENT — PAIN DESCRIPTION - ORIENTATION: ORIENTATION: LEFT

## 2018-08-11 ASSESSMENT — PAIN DESCRIPTION - LOCATION: LOCATION: ARM

## 2018-08-11 ASSESSMENT — PAIN DESCRIPTION - PROGRESSION: CLINICAL_PROGRESSION: NOT CHANGED

## 2018-08-11 ASSESSMENT — PAIN DESCRIPTION - PAIN TYPE: TYPE: ACUTE PAIN

## 2018-08-11 NOTE — ED PROVIDER NOTES
and time. He appears well-developed and well-nourished. No distress. HENT:   Head: Normocephalic and atraumatic. Eyes: Pupils are equal, round, and reactive to light. Conjunctivae and EOM are normal.   Neck: Normal range of motion. Neck supple. Cardiovascular: Normal rate, regular rhythm, normal heart sounds and intact distal pulses. Exam reveals no gallop and no friction rub. No murmur heard. Pulmonary/Chest: Effort normal and breath sounds normal. No respiratory distress. He has no wheezes. Abdominal: Soft. Bowel sounds are normal. He exhibits no distension. There is tenderness in the epigastric area, periumbilical area and left upper quadrant. There is no rebound, no guarding and no CVA tenderness. Musculoskeletal: Normal range of motion. He exhibits no edema or tenderness. Neurological: He is alert and oriented to person, place, and time. Skin: Skin is warm and dry. No rash noted. Psychiatric: He has a normal mood and affect.  His behavior is normal.       DIFFERENTIAL  DIAGNOSIS     PLAN (LABS / IMAGING / EKG):  Orders Placed This Encounter   Procedures    Urine Culture    CBC WITH AUTO DIFFERENTIAL    Comprehensive Metabolic Panel    Lipase       MEDICATIONS ORDERED:  Orders Placed This Encounter   Medications    0.9 % sodium chloride bolus    ketorolac (TORADOL) injection 30 mg    ondansetron (ZOFRAN) injection 4 mg    AND Linked Order Group     pantoprazole (PROTONIX) injection 40 mg     sodium chloride (PF) 0.9 % injection 10 mL    dicyclomine (BENTYL) 10 MG capsule     Sig: Take 1 capsule by mouth every 6 hours as needed (cramps)     Dispense:  20 capsule     Refill:  0    ibuprofen (ADVIL;MOTRIN) 800 MG tablet     Sig: Take 1 tablet by mouth every 8 hours as needed for Pain     Dispense:  30 tablet     Refill:  0    ondansetron (ZOFRAN) 4 MG tablet     Sig: Take 1 tablet by mouth every 8 hours as needed for Nausea     Dispense:  10 tablet     Refill:  0       DDX: GERD, PUD, pancreatitis, cholecystitis, GB colic, cholangitis, SBO, DKA, AAA, mesenteric ischemia, perforated viscous, acute gastroenteritis, NSAP, pyelonephritis, kidney stone, appendicitis, hernia, D-TICS, UTI, constipation, testicular torsion, epididymitis/ orchitis    DIAGNOSTIC RESULTS / [address] [place] [address] / [facility]     LABS:  Results for orders placed or performed during the hospital encounter of 08/10/18   Urine Culture   Result Value Ref Range    Specimen Description . CLEAN CATCH URINE     Special Requests NOT REPORTED     Culture PATIENT DISCHARGED BEFORE SAMPLE COLLECTED     Status FINAL 08/10/2018    CBC WITH AUTO DIFFERENTIAL   Result Value Ref Range    WBC 6.5 3.5 - 11.3 k/uL    RBC 4.61 4.21 - 5.77 m/uL    Hemoglobin 13.6 13.0 - 17.0 g/dL    Hematocrit 42.1 40.7 - 50.3 %    MCV 91.3 82.6 - 102.9 fL    MCH 29.5 25.2 - 33.5 pg    MCHC 32.3 28.4 - 34.8 g/dL    RDW 12.0 11.8 - 14.4 %    Platelets 323 831 - 097 k/uL    MPV 9.5 8.1 - 13.5 fL    NRBC Automated 0.0 0.0 per 100 WBC    Differential Type NOT REPORTED     Seg Neutrophils 77 (H) 36 - 65 %    Lymphocytes 12 (L) 24 - 43 %    Monocytes 11 3 - 12 %    Eosinophils % 0 (L) 1 - 4 %    Basophils 0 0 - 2 %    Immature Granulocytes 0 0 %    Segs Absolute 4.95 1.50 - 8.10 k/uL    Absolute Lymph # 0.77 (L) 1.10 - 3.70 k/uL    Absolute Mono # 0.69 0.10 - 1.20 k/uL    Absolute Eos # <0.03 0.00 - 0.44 k/uL    Basophils # <0.03 0.00 - 0.20 k/uL    Absolute Immature Granulocyte <0.03 0.00 - 0.30 k/uL    WBC Morphology NOT REPORTED     RBC Morphology NOT REPORTED     Platelet Estimate NOT REPORTED    Comprehensive Metabolic Panel   Result Value Ref Range    Glucose 100 (H) 70 - 99 mg/dL    BUN 10 6 - 20 mg/dL    CREATININE 0.99 0.70 - 1.20 mg/dL    Bun/Cre Ratio NOT REPORTED 9 - 20    Calcium 8.9 8.6 - 10.4 mg/dL    Sodium 141 135 - 144 mmol/L    Potassium 3.5 (L) 3.7 - 5.3 mmol/L    Chloride 104 98 - 107 mmol/L    CO2 23 20 - 31 mmol/L    Anion Gap 14 9 - 17 mmol/L Alkaline Phosphatase 68 40 - 129 U/L    ALT 11 5 - 41 U/L    AST 27 <40 U/L    Total Bilirubin 0.83 0.3 - 1.2 mg/dL    Total Protein 7.7 6.4 - 8.3 g/dL    Alb 4.5 3.5 - 5.2 g/dL    Albumin/Globulin Ratio 1.4 1.0 - 2.5    GFR Non-African American >60 >60 mL/min    GFR African American >60 >60 mL/min    GFR Comment          GFR Staging NOT REPORTED    Lipase   Result Value Ref Range    Lipase 21 13 - 60 U/L       RADIOLOGY:  None    EKG  None    All EKG's are interpreted by the Emergency Department Physician who either signs or Co-signs this chart in the absence of a cardiologist.    EMERGENCY DEPARTMENT COURSE:  Pt seen and evaluated. He is laying in the bed uncomfortably due to his ongoing nausea and abdominal pain. Abdominal labs ordered. Pt given IVFs, toradol, zofran, and protonix. No acute findigns on lab results. Discussed all findings and treatment plan with pt who voiced understanding and is in agreement with the plan. On re-evaluation, he reported good symptom improvement. PO trial attempted and tolerated well. Pt stable and ready for discharge home with new scripts. PROCEDURES:  None    CONSULTS:  None    CRITICAL CARE:  None    FINAL IMPRESSION      1. Generalized abdominal pain    2. Nausea vomiting and diarrhea    3. Gastroenteritis          DISPOSITION / PLAN     DISPOSITION Decision To Discharge 08/10/2018 12:38:35 PM      PATIENT REFERRED TO:  Romina Gordon MD  [address] [address]  [phone]    Call in 2 days  As needed, If symptoms worsen      DISCHARGE MEDICATIONS:  Discharge Medication List as of 8/10/2018 12:41 PM      START taking these medications    Details   !! dicyclomine (BENTYL) 10 MG capsule Take 1 capsule by mouth every 6 hours as needed (cramps), Disp-20 capsule, R-0Print      !! ibuprofen (ADVIL;MOTRIN) 800 MG tablet Take 1 tablet by mouth every 8 hours as needed for Pain, Disp-30 tablet, R-0Print       !! - Potential duplicate medications found.  Please

## 2018-08-15 ASSESSMENT — ENCOUNTER SYMPTOMS
COUGH: 0
VOMITING: 1
NAUSEA: 1
SHORTNESS OF BREATH: 0
DIARRHEA: 1
SORE THROAT: 0
ABDOMINAL PAIN: 1
EYE PAIN: 0

## 2018-08-16 ASSESSMENT — ENCOUNTER SYMPTOMS
DIARRHEA: 0
COUGH: 0
SORE THROAT: 0
EYE PAIN: 0
NAUSEA: 0
SHORTNESS OF BREATH: 0
ABDOMINAL PAIN: 0

## 2018-08-30 ENCOUNTER — HOSPITAL ENCOUNTER (EMERGENCY)
Age: 23
Discharge: HOME | End: 2018-08-30
Attending: EMERGENCY MEDICINE
Payer: COMMERCIAL

## 2018-08-30 VITALS
RESPIRATION RATE: 14 BRPM | TEMPERATURE: 98.8 F | BODY MASS INDEX: 20.22 KG/M2 | OXYGEN SATURATION: 98 % | DIASTOLIC BLOOD PRESSURE: 77 MMHG | HEART RATE: 83 BPM | WEIGHT: 145 LBS | SYSTOLIC BLOOD PRESSURE: 124 MMHG

## 2018-08-30 DIAGNOSIS — L02.91 ABSCESS: Primary | ICD-10-CM

## 2018-08-30 PROCEDURE — 6360000002 HC RX W HCPCS: Performed by: EMERGENCY MEDICINE

## 2018-08-30 PROCEDURE — 6370000000 HC RX 637 (ALT 250 FOR IP): Performed by: EMERGENCY MEDICINE

## 2018-08-30 PROCEDURE — 99282 EMERGENCY DEPT VISIT SF MDM: CPT

## 2018-08-30 PROCEDURE — 10060 I&D ABSCESS SIMPLE/SINGLE: CPT

## 2018-08-30 PROCEDURE — 96372 THER/PROPH/DIAG INJ SC/IM: CPT

## 2018-08-30 RX ORDER — MORPHINE SULFATE 4 MG/ML
4 INJECTION, SOLUTION INTRAMUSCULAR; INTRAVENOUS ONCE
Status: COMPLETED | OUTPATIENT
Start: 2018-08-30 | End: 2018-08-30

## 2018-08-30 RX ORDER — IBUPROFEN 800 MG/1
800 TABLET ORAL EVERY 8 HOURS PRN
Qty: 30 TABLET | Refills: 0 | Status: SHIPPED | OUTPATIENT
Start: 2018-08-30 | End: 2019-03-08

## 2018-08-30 RX ORDER — DOXYCYCLINE HYCLATE 100 MG
100 TABLET ORAL ONCE
Status: COMPLETED | OUTPATIENT
Start: 2018-08-30 | End: 2018-08-30

## 2018-08-30 RX ORDER — DOXYCYCLINE 100 MG/1
100 TABLET ORAL 2 TIMES DAILY
Qty: 28 TABLET | Refills: 0 | Status: SHIPPED | OUTPATIENT
Start: 2018-08-30 | End: 2018-09-13

## 2018-08-30 RX ADMIN — DOXYCYCLINE HYCLATE 100 MG: 100 TABLET, COATED ORAL at 02:42

## 2018-08-30 RX ADMIN — MORPHINE SULFATE 4 MG: 4 INJECTION INTRAVENOUS at 02:09

## 2018-08-30 ASSESSMENT — ENCOUNTER SYMPTOMS
SHORTNESS OF BREATH: 0
VOMITING: 0
ABDOMINAL PAIN: 0
BACK PAIN: 0
SORE THROAT: 0

## 2018-08-30 ASSESSMENT — PAIN DESCRIPTION - DESCRIPTORS: DESCRIPTORS: DISCOMFORT

## 2018-08-30 ASSESSMENT — PAIN DESCRIPTION - ORIENTATION: ORIENTATION: MID

## 2018-08-30 ASSESSMENT — PAIN SCALES - GENERAL: PAINLEVEL_OUTOF10: 10

## 2018-08-30 ASSESSMENT — PAIN DESCRIPTION - LOCATION: LOCATION: BUTTOCKS

## 2018-08-30 NOTE — ED PROVIDER NOTES
[facility]     Emergency Department     Faculty Attestation    I performed a history and physical examination of the patient and discussed management with the resident. I have reviewed and agree with the residents findings including all diagnostic interpretations, and treatment plans as written. Any areas of disagreement are noted on the chart. I was personally present for the key portions of any procedures. I have documented in the chart those procedures where I was not present during the key portions. I have reviewed the emergency nurses triage note. I agree with the chief complaint, past medical history, past surgical history, allergies, medications, social and family history as documented unless otherwise noted below. Documentation of the HPI, Physical Exam and Medical Decision Making performed by [email or web address] is based on my personal performance of the HPI, PE and MDM. For Physician Assistant/ Nurse Practitioner cases/documentation I have personally evaluated this patient and have completed at least one if not all key elements of the E/M (history, physical exam, and MDM). Additional findings are as noted. 27 yo M l buttock abscess no fever   pe swelling erythema drainage, medial l buttock , vss,   Incised, abx started, vss,     Pre-hypertension/Hypertension: The patient has been informed that they may have pre-hypertension or Hypertension based on a blood pressure reading in the emergency department. I recommend that the patient call the primary care provider listed on their discharge instructions or a physician of their choice this week to arrange follow up for further evaluation of possible pre-hypertension or Hypertension. EKG Interpretation    Interpreted by me      CRITICAL CARE: There was a high probability of clinically significant/life threatening deterioration in this patient's condition which required my urgent intervention.   Total critical

## 2018-08-30 NOTE — ED PROVIDER NOTES
[address]  ED  Emergency Department Encounter  Emergency Medicine Resident     Pt Name: Andrey Conte  MRN: [de-identified]  [place] 1995  Date of evaluation: 8/30/18  PCP:  Jose Cruz Hernandez MD    CHIEF COMPLAINT       Chief Complaint   Patient presents with    Abscess     intergluteal cleft of buttock per pt, bleeding and open       HISTORY OF PRESENT ILLNESS  (Location/Symptom, Timing/Onset, Context/Setting, Quality, Duration, Modifying Factors, Severity.)      Andrey Conte is a 26 y.o. male who presents with Abscess. Patient states he's had an abscess developing on the left gluteus for the past few days. He states now draining. He thinks he may have been bit by an insect. Patient states he's had boils in the past.  Denies any allergies. Denies any pain with defecation, abdominal pain, fevers or chills or vomiting or rashes. Patient has tried Motrin with minimal relief. PAST MEDICAL / SURGICAL / SOCIAL / FAMILY HISTORY      has a past medical history of ADHD (attention deficit hyperactivity disorder); Allergic rhinitis; Allergy; Asthma; Depression; Headache(784.0); Scoliosis of lumbar spine; and Vision abnormalities. has a past surgical history that includes Tonsillectomy. Social History     Social History    Marital status: Single     Spouse name: N/A    Number of children: N/A    Years of education: N/A     Occupational History    Not on file.      Social History Main Topics    Smoking status: Never Smoker    Smokeless tobacco: Never Used    Alcohol use No    Drug use: No    Sexual activity: Not on file     Other Topics Concern    Not on file     Social History Narrative    No narrative on file       Family History   Problem Relation Age of Onset    Asthma Mother     High Blood Pressure Mother     Asthma Maternal Grandmother     Heart Disease Maternal Grandmother     High Blood Pressure Maternal Grandmother     Arthritis Neg Hx     Birth Defects Neg Hx  Cancer Neg Hx     Depression Neg Hx     Diabetes Neg Hx     Early Death Neg Hx     Hearing Loss Neg Hx     Kidney Disease Neg Hx     High Cholesterol Neg Hx     Learning Disabilities Neg Hx     Mental Illness Neg Hx     Mental Retardation Neg Hx     Miscarriages / Stillbirths Neg Hx     Stroke Neg Hx     Substance Abuse Neg Hx     Vision Loss Neg Hx        Allergies:  Patient has no known allergies. Home Medications:  Prior to Admission medications    Medication Sig Start Date End Date Taking?  Authorizing Provider   doxycycline monohydrate (ADOXA) 100 MG tablet Take 1 tablet by mouth 2 times daily for 14 days 8/30/18 9/13/18 Yes Anthony Barker MD   ibuprofen (ADVIL;MOTRIN) 800 MG tablet Take 1 tablet by mouth every 8 hours as needed for Pain 8/30/18  Yes Anthony Barker MD   diphenhydrAMINE (BENADRYL) 25 MG tablet Take 1 tablet by mouth every 6 hours as needed for Itching or Allergies 8/11/18  Yes Avery Watkins MD   ibuprofen (ADVIL;MOTRIN) 800 MG tablet Take 1 tablet by mouth every 8 hours as needed for Pain 8/10/18  Yes Avery Watkins MD   ondansetron (ZOFRAN) 4 MG tablet Take 1 tablet by mouth every 8 hours as needed for Nausea 8/10/18  Yes Avery Watkins MD   pantoprazole (PROTONIX) 20 MG tablet Take 1 tablet by mouth daily for 14 days 7/31/18 8/30/18 Yes Jean Carlos Fonseca MD   Lactobacillus (PROBIOTIC ACIDOPHILUS) TABS Take 1 tablet by mouth daily 4/12/18  Yes Tg Castro MD   fluticasone (FLONASE) 50 MCG/ACT nasal spray 1 spray by Nasal route daily 7/18/17  Yes Rainer Fisher MD   cloNIDine (CATAPRES) 0.2 MG tablet Take 0.2 mg by mouth nightly 5/15/17  Yes Historical Provider, MD   amphetamine-dextroamphetamine (ADDERALL XR) 20 MG extended release capsule Take 1 capsule by mouth every morning 10/17/16  Yes Historical Provider, MD   Omega-3 Fatty Acids (FISH OIL) 1000 MG CAPS  9/15/15  Yes Historical Provider, MD   sodium chloride (OCEAN) 0.65 % nasal spray 1 spray cleft) noted. He is not diaphoretic. Psychiatric: Thought content normal.   Nursing note and vitals reviewed. DIFFERENTIAL  DIAGNOSIS     PLAN (LABS / IMAGING / EKG):  No orders of the defined types were placed in this encounter. MEDICATIONS ORDERED:  Orders Placed This Encounter   Medications    morphine (PF) injection 4 mg    doxycycline hyclate (VIBRA-TABS) tablet 100 mg    doxycycline monohydrate (ADOXA) 100 MG tablet     Sig: Take 1 tablet by mouth 2 times daily for 14 days     Dispense:  28 tablet     Refill:  0    ibuprofen (ADVIL;MOTRIN) 800 MG tablet     Sig: Take 1 tablet by mouth every 8 hours as needed for Pain     Dispense:  30 tablet     Refill:  0       DDX: Abscess, cellulitis    DIAGNOSTIC RESULTS / [address] [place] [address] / [facility]     LABS:  No results found for this visit on 08/30/18. RADIOLOGY:  None    EKG  None    All EKG's are interpreted by the Emergency Department Physician who either signs or Co-signs this chart in the absence of a cardiologist.    [facility]/EMERGENCY DEPARTMENT COURSE:  Patient is a 60-year-old male that presents with abscess. It is draining. Afebrile here. Patient given morphine prior to Bedside incision and drainage performed, see procedure note below. Wound packed. Instructed to change packing once daily and soak in warm soap and water and keep wound clean and use Neosporin. Instructed to follow up with primary doctor and return if symptoms worsen. Patient given scripts for Motrin. Patient in agreement with plan. Patient started on doxycycline. PROCEDURES:  Incision/Drainage  Date/Time: 8/30/2018 5:05 AM  Performed by: Mervin Ryan  Authorized by: Osmany Mendiola     Consent:     Consent obtained:  Verbal    Consent given by:  Patient    Risks discussed:  Pain  Location:     Type:  Abscess    Size:  2x2 cm    Location:  Anogenital    Anogenital location:  Perirectal  Anesthesia (see MAR for exact dosages):      Anesthesia method:  Local

## 2018-08-30 NOTE — ED NOTES
Pt ambulatory to ed with c/o abscess to buttocks, pt states it is in the middle and has been bleeding, is unable to sit down. Pt arrived in NAD. RR even and unlabored, family at bedside. Call light within reach.       Nahed Gómez RN  08/30/18 9426

## 2018-08-30 NOTE — H&P
Pt presents with complaint of a worsening abscess. The abscess is located on the left side of the gluteal cleft. He has had an abscess on his left gluteal cheek approximately 2 years ago. He denies picking at it or trauma to the area. He denies fever, dysuria, and dyschezia. He says the abscess has been draining blood and puss. He denies SOB, chest pain, and abdominal pain.

## 2018-10-01 ENCOUNTER — HOSPITAL ENCOUNTER (EMERGENCY)
Age: 23
Discharge: HOME | End: 2018-10-01
Attending: EMERGENCY MEDICINE
Payer: COMMERCIAL

## 2018-10-01 VITALS
HEIGHT: 71 IN | DIASTOLIC BLOOD PRESSURE: 68 MMHG | BODY MASS INDEX: 20.3 KG/M2 | TEMPERATURE: 98.4 F | HEART RATE: 79 BPM | OXYGEN SATURATION: 100 % | WEIGHT: 145 LBS | RESPIRATION RATE: 12 BRPM | SYSTOLIC BLOOD PRESSURE: 128 MMHG

## 2018-10-01 DIAGNOSIS — R11.2 NON-INTRACTABLE VOMITING WITH NAUSEA, UNSPECIFIED VOMITING TYPE: ICD-10-CM

## 2018-10-01 DIAGNOSIS — R51.9 NONINTRACTABLE EPISODIC HEADACHE, UNSPECIFIED HEADACHE TYPE: Primary | ICD-10-CM

## 2018-10-01 PROCEDURE — 99283 EMERGENCY DEPT VISIT LOW MDM: CPT

## 2018-10-01 PROCEDURE — 6370000000 HC RX 637 (ALT 250 FOR IP): Performed by: STUDENT IN AN ORGANIZED HEALTH CARE EDUCATION/TRAINING PROGRAM

## 2018-10-01 PROCEDURE — 6360000002 HC RX W HCPCS: Performed by: STUDENT IN AN ORGANIZED HEALTH CARE EDUCATION/TRAINING PROGRAM

## 2018-10-01 PROCEDURE — 96374 THER/PROPH/DIAG INJ IV PUSH: CPT

## 2018-10-01 PROCEDURE — 2580000003 HC RX 258: Performed by: STUDENT IN AN ORGANIZED HEALTH CARE EDUCATION/TRAINING PROGRAM

## 2018-10-01 RX ORDER — 0.9 % SODIUM CHLORIDE 0.9 %
1000 INTRAVENOUS SOLUTION INTRAVENOUS ONCE
Status: COMPLETED | OUTPATIENT
Start: 2018-10-01 | End: 2018-10-01

## 2018-10-01 RX ORDER — DIPHENHYDRAMINE HCL 25 MG
25 TABLET ORAL ONCE
Status: COMPLETED | OUTPATIENT
Start: 2018-10-01 | End: 2018-10-01

## 2018-10-01 RX ADMIN — PROCHLORPERAZINE EDISYLATE 10 MG: 5 INJECTION INTRAMUSCULAR; INTRAVENOUS at 01:18

## 2018-10-01 RX ADMIN — SODIUM CHLORIDE 1000 ML: 9 INJECTION, SOLUTION INTRAVENOUS at 01:18

## 2018-10-01 RX ADMIN — DIPHENHYDRAMINE HCL 25 MG: 25 TABLET ORAL at 01:18

## 2018-10-01 ASSESSMENT — ENCOUNTER SYMPTOMS
SHORTNESS OF BREATH: 0
ABDOMINAL PAIN: 0
SORE THROAT: 0
VOMITING: 1
NAUSEA: 1
PHOTOPHOBIA: 0

## 2018-10-01 ASSESSMENT — PAIN DESCRIPTION - PAIN TYPE: TYPE: ACUTE PAIN

## 2018-10-01 ASSESSMENT — PAIN SCALES - GENERAL: PAINLEVEL_OUTOF10: 10

## 2018-10-01 ASSESSMENT — PAIN DESCRIPTION - LOCATION: LOCATION: HEAD

## 2018-10-01 NOTE — ED PROVIDER NOTES
Sexual activity: Not on file     Other Topics Concern    Not on file     Social History Narrative    No narrative on file       Family History   Problem Relation Age of Onset    Asthma Mother     High Blood Pressure Mother     Asthma Maternal Grandmother     Heart Disease Maternal Grandmother     High Blood Pressure Maternal Grandmother     Arthritis Neg Hx     Birth Defects Neg Hx     Cancer Neg Hx     Depression Neg Hx     Diabetes Neg Hx     Early Death Neg Hx     Hearing Loss Neg Hx     Kidney Disease Neg Hx     High Cholesterol Neg Hx     Learning Disabilities Neg Hx     Mental Illness Neg Hx     Mental Retardation Neg Hx     Miscarriages / Stillbirths Neg Hx     Stroke Neg Hx     Substance Abuse Neg Hx     Vision Loss Neg Hx        Allergies:  Patient has no known allergies. Home Medications:  Prior to Admission medications    Medication Sig Start Date End Date Taking?  Authorizing Provider   ibuprofen (ADVIL;MOTRIN) 800 MG tablet Take 1 tablet by mouth every 8 hours as needed for Pain 8/30/18   Bryant Haynes MD   diphenhydrAMINE (BENADRYL) 25 MG tablet Take 1 tablet by mouth every 6 hours as needed for Itching or Allergies 8/11/18   Em Palacios MD   ibuprofen (ADVIL;MOTRIN) 800 MG tablet Take 1 tablet by mouth every 8 hours as needed for Pain 8/10/18   Em Palacios MD   ondansetron (ZOFRAN) 4 MG tablet Take 1 tablet by mouth every 8 hours as needed for Nausea 8/10/18   Em Palacios MD   pantoprazole (PROTONIX) 20 MG tablet Take 1 tablet by mouth daily for 14 days 7/31/18 8/30/18  Hansel Mcdaniels MD   Lactobacillus (PROBIOTIC ACIDOPHILUS) TABS Take 1 tablet by mouth daily 4/12/18   Rambo Nicole MD   sodium chloride (OCEAN) 0.65 % nasal spray 1 spray by Nasal route as needed for Congestion 7/18/17   Rainer Sarmiento MD   fluticasone (FLONASE) 50 MCG/ACT nasal spray 1 spray by Nasal route daily 7/18/17   Rainer Sarmiento MD   diphenhydrAMINE-zinc acetate (BENADRYL EXTRA STRENGTH) 2-0.1 % cream Apply topically 2 times daily as needed for Itching 7/14/17   Rainer Pressley MD   cloNIDine (CATAPRES) 0.2 MG tablet Take 0.2 mg by mouth nightly 5/15/17   Historical Provider, MD   benzoyl peroxide 5 % gel Apply topically daily. 6/1/17   Sanju Samosn MD   amphetamine-dextroamphetamine (ADDERALL XR) 20 MG extended release capsule Take 1 capsule by mouth every morning 10/17/16   Historical Provider, MD   Omega-3 Fatty Acids (FISH OIL) 1000 MG CAPS  9/15/15   Historical Provider, MD       REVIEW OF SYSTEMS    (2-9 systems for level 4, 10 or more for level 5)      Review of Systems   Constitutional: Negative for chills and fever. HENT: Negative for congestion and sore throat. Eyes: Negative for photophobia and visual disturbance. Respiratory: Negative for shortness of breath. Cardiovascular: Negative for chest pain. Gastrointestinal: Positive for nausea and vomiting. Negative for abdominal pain. Musculoskeletal: Negative for neck pain and neck stiffness. Neurological: Positive for headaches. Negative for weakness. PHYSICAL EXAM   (up to 7 for level 4, 8 or more for level 5)      INITIAL VITALS:   /68   Pulse 79   Temp 98.4 °F (36.9 °C) (Oral)   Ht 5' 11\" (1.803 m)   Wt 145 lb (65.8 kg)   SpO2 100%   BMI 20.22 kg/m²     Physical Exam   Constitutional: He is oriented to person, place, and time. He appears well-developed. No distress. HENT:   Head: Normocephalic and atraumatic. Eyes: Pupils are equal, round, and reactive to light. EOM are normal.   Neck: Normal range of motion. Neck supple. No signs of meningismus on exam.   Cardiovascular: Normal rate and regular rhythm. Pulmonary/Chest: Effort normal. No stridor. No respiratory distress. Abdominal: Soft. He exhibits no distension. There is no tenderness. Neurological: He is alert and oriented to person, place, and time.    Unremarkable neurologic exam.       DIFFERENTIAL specified.     DISCHARGE MEDICATIONS:  New Prescriptions    No medications on file       Calderon Eller DO  Emergency Medicine Resident    (Please note that portions of this note were completed with a voice recognition program.  Efforts were made to edit the dictations but occasionally words are mis-transcribed.)        Calderon Eller DO  10/01/18 5478

## 2018-10-01 NOTE — ED NOTES
Patient back to room, Dr. Fletcher Chittenden at bedside for evaluation.       Rebecca Leyden, RN  10/01/18 8017

## 2018-10-02 ENCOUNTER — CARE COORDINATION (OUTPATIENT)
Dept: CARE COORDINATION | Age: 23
End: 2018-10-02

## 2018-10-02 NOTE — CARE COORDINATION
Patient was called to follow up with most recent ER visit. There was no answer. A message was left on voicemail to have patient call back regarding ER visit. Office number given [phone].

## 2019-03-08 ENCOUNTER — HOSPITAL ENCOUNTER (EMERGENCY)
Age: 24
Discharge: HOME | End: 2019-03-08
Attending: EMERGENCY MEDICINE
Payer: COMMERCIAL

## 2019-03-08 ENCOUNTER — APPOINTMENT (OUTPATIENT)
Dept: GENERAL RADIOLOGY | Age: 24
End: 2019-03-08
Payer: COMMERCIAL

## 2019-03-08 VITALS
DIASTOLIC BLOOD PRESSURE: 82 MMHG | HEART RATE: 64 BPM | RESPIRATION RATE: 16 BRPM | TEMPERATURE: 97.9 F | OXYGEN SATURATION: 100 % | SYSTOLIC BLOOD PRESSURE: 117 MMHG

## 2019-03-08 DIAGNOSIS — M76.62 ACHILLES TENDINITIS OF LEFT LOWER EXTREMITY: Primary | ICD-10-CM

## 2019-03-08 PROCEDURE — 6360000002 HC RX W HCPCS: Performed by: PHYSICIAN ASSISTANT

## 2019-03-08 PROCEDURE — 96372 THER/PROPH/DIAG INJ SC/IM: CPT

## 2019-03-08 PROCEDURE — 99283 EMERGENCY DEPT VISIT LOW MDM: CPT

## 2019-03-08 PROCEDURE — 73630 X-RAY EXAM OF FOOT: CPT

## 2019-03-08 PROCEDURE — 73610 X-RAY EXAM OF ANKLE: CPT

## 2019-03-08 RX ORDER — IBUPROFEN 800 MG/1
800 TABLET ORAL EVERY 6 HOURS PRN
Qty: 20 TABLET | Refills: 0 | Status: SHIPPED | OUTPATIENT
Start: 2019-03-08 | End: 2020-09-22

## 2019-03-08 RX ORDER — KETOROLAC TROMETHAMINE 15 MG/ML
15 INJECTION, SOLUTION INTRAMUSCULAR; INTRAVENOUS ONCE
Status: COMPLETED | OUTPATIENT
Start: 2019-03-08 | End: 2019-03-08

## 2019-03-08 RX ADMIN — KETOROLAC TROMETHAMINE 15 MG: 15 INJECTION, SOLUTION INTRAMUSCULAR; INTRAVENOUS at 22:30

## 2019-03-08 ASSESSMENT — PAIN SCALES - GENERAL: PAINLEVEL_OUTOF10: 10

## 2019-03-09 ASSESSMENT — ENCOUNTER SYMPTOMS
VOMITING: 0
SORE THROAT: 0
COUGH: 0
COLOR CHANGE: 0
DIARRHEA: 0
NAUSEA: 0
ABDOMINAL PAIN: 0
BACK PAIN: 0
SHORTNESS OF BREATH: 0

## 2019-03-11 ENCOUNTER — OFFICE VISIT (OUTPATIENT)
Dept: PRIMARY CARE CLINIC | Age: 24
End: 2019-03-11
Payer: COMMERCIAL

## 2019-03-11 VITALS
SYSTOLIC BLOOD PRESSURE: 127 MMHG | BODY MASS INDEX: 21.34 KG/M2 | HEART RATE: 61 BPM | DIASTOLIC BLOOD PRESSURE: 77 MMHG | WEIGHT: 153 LBS | TEMPERATURE: 97.9 F | OXYGEN SATURATION: 99 %

## 2019-03-11 DIAGNOSIS — M76.62 ACHILLES TENDINITIS OF LEFT LOWER EXTREMITY: Primary | ICD-10-CM

## 2019-03-11 DIAGNOSIS — M21.41 PES PLANUS OF BOTH FEET: ICD-10-CM

## 2019-03-11 DIAGNOSIS — M21.42 PES PLANUS OF BOTH FEET: ICD-10-CM

## 2019-03-11 DIAGNOSIS — Z23 FLU VACCINE NEED: ICD-10-CM

## 2019-03-11 PROCEDURE — 90688 IIV4 VACCINE SPLT 0.5 ML IM: CPT | Performed by: INTERNAL MEDICINE

## 2019-03-11 PROCEDURE — 90471 IMMUNIZATION ADMIN: CPT | Performed by: INTERNAL MEDICINE

## 2019-03-11 PROCEDURE — 1036F TOBACCO NON-USER: CPT | Performed by: INTERNAL MEDICINE

## 2019-03-11 PROCEDURE — G8427 DOCREV CUR MEDS BY ELIG CLIN: HCPCS | Performed by: INTERNAL MEDICINE

## 2019-03-11 PROCEDURE — G8482 FLU IMMUNIZE ORDER/ADMIN: HCPCS | Performed by: INTERNAL MEDICINE

## 2019-03-11 PROCEDURE — G8420 CALC BMI NORM PARAMETERS: HCPCS | Performed by: INTERNAL MEDICINE

## 2019-03-11 PROCEDURE — 99213 OFFICE O/P EST LOW 20 MIN: CPT | Performed by: INTERNAL MEDICINE

## 2019-03-11 RX ORDER — NAPROXEN 500 MG/1
500 TABLET ORAL 2 TIMES DAILY WITH MEALS
Qty: 60 TABLET | Refills: 0 | Status: SHIPPED | OUTPATIENT
Start: 2019-03-11 | End: 2020-03-03

## 2019-03-11 ASSESSMENT — PATIENT HEALTH QUESTIONNAIRE - PHQ9
SUM OF ALL RESPONSES TO PHQ QUESTIONS 1-9: 0
SUM OF ALL RESPONSES TO PHQ9 QUESTIONS 1 & 2: 0
1. LITTLE INTEREST OR PLEASURE IN DOING THINGS: 0
SUM OF ALL RESPONSES TO PHQ QUESTIONS 1-9: 0
2. FEELING DOWN, DEPRESSED OR HOPELESS: 0

## 2019-03-12 ENCOUNTER — OFFICE VISIT (OUTPATIENT)
Dept: ORTHOPEDIC SURGERY | Age: 24
End: 2019-03-12
Payer: COMMERCIAL

## 2019-03-12 VITALS — HEIGHT: 71 IN | BODY MASS INDEX: 21.42 KG/M2 | WEIGHT: 153 LBS

## 2019-03-12 DIAGNOSIS — M21.41 PES PLANUS OF BOTH FEET: ICD-10-CM

## 2019-03-12 DIAGNOSIS — M21.42 PES PLANUS OF BOTH FEET: ICD-10-CM

## 2019-03-12 DIAGNOSIS — S90.32XA CONTUSION OF LEFT FOOT, INITIAL ENCOUNTER: Primary | ICD-10-CM

## 2019-03-12 PROCEDURE — G8420 CALC BMI NORM PARAMETERS: HCPCS | Performed by: ORTHOPAEDIC SURGERY

## 2019-03-12 PROCEDURE — 99203 OFFICE O/P NEW LOW 30 MIN: CPT | Performed by: ORTHOPAEDIC SURGERY

## 2019-03-12 PROCEDURE — G8427 DOCREV CUR MEDS BY ELIG CLIN: HCPCS | Performed by: ORTHOPAEDIC SURGERY

## 2019-03-12 PROCEDURE — G8482 FLU IMMUNIZE ORDER/ADMIN: HCPCS | Performed by: ORTHOPAEDIC SURGERY

## 2019-03-12 PROCEDURE — 1036F TOBACCO NON-USER: CPT | Performed by: ORTHOPAEDIC SURGERY

## 2019-03-12 ASSESSMENT — ENCOUNTER SYMPTOMS
ABDOMINAL PAIN: 0
SHORTNESS OF BREATH: 0
EYE PAIN: 0

## 2019-03-14 ENCOUNTER — OFFICE VISIT (OUTPATIENT)
Dept: INTERNAL MEDICINE | Age: 24
End: 2019-03-14
Payer: COMMERCIAL

## 2019-03-14 VITALS
HEIGHT: 71 IN | DIASTOLIC BLOOD PRESSURE: 86 MMHG | WEIGHT: 152 LBS | BODY MASS INDEX: 21.28 KG/M2 | SYSTOLIC BLOOD PRESSURE: 134 MMHG | HEART RATE: 58 BPM

## 2019-03-14 DIAGNOSIS — M21.42 PES PLANUS OF LEFT FOOT: ICD-10-CM

## 2019-03-14 DIAGNOSIS — Z23 NEED FOR TDAP VACCINATION: Primary | ICD-10-CM

## 2019-03-14 PROCEDURE — G8482 FLU IMMUNIZE ORDER/ADMIN: HCPCS | Performed by: STUDENT IN AN ORGANIZED HEALTH CARE EDUCATION/TRAINING PROGRAM

## 2019-03-14 PROCEDURE — 99211 OFF/OP EST MAY X REQ PHY/QHP: CPT | Performed by: INTERNAL MEDICINE

## 2019-03-14 PROCEDURE — G8420 CALC BMI NORM PARAMETERS: HCPCS | Performed by: STUDENT IN AN ORGANIZED HEALTH CARE EDUCATION/TRAINING PROGRAM

## 2019-03-14 PROCEDURE — 90715 TDAP VACCINE 7 YRS/> IM: CPT | Performed by: INTERNAL MEDICINE

## 2019-03-14 PROCEDURE — 99213 OFFICE O/P EST LOW 20 MIN: CPT | Performed by: STUDENT IN AN ORGANIZED HEALTH CARE EDUCATION/TRAINING PROGRAM

## 2019-03-14 PROCEDURE — 1036F TOBACCO NON-USER: CPT | Performed by: STUDENT IN AN ORGANIZED HEALTH CARE EDUCATION/TRAINING PROGRAM

## 2019-03-14 PROCEDURE — G8427 DOCREV CUR MEDS BY ELIG CLIN: HCPCS | Performed by: STUDENT IN AN ORGANIZED HEALTH CARE EDUCATION/TRAINING PROGRAM

## 2019-03-26 ENCOUNTER — HOSPITAL ENCOUNTER (OUTPATIENT)
Dept: PHYSICAL THERAPY | Age: 24
Setting detail: THERAPIES SERIES
Discharge: HOME | End: 2019-03-26
Payer: COMMERCIAL

## 2019-03-26 PROCEDURE — 97161 PT EVAL LOW COMPLEX 20 MIN: CPT

## 2019-03-26 PROCEDURE — 97110 THERAPEUTIC EXERCISES: CPT

## 2019-04-02 ENCOUNTER — HOSPITAL ENCOUNTER (OUTPATIENT)
Dept: PHYSICAL THERAPY | Age: 24
Setting detail: THERAPIES SERIES
Discharge: HOME | End: 2019-04-02
Payer: COMMERCIAL

## 2019-04-02 PROCEDURE — 97110 THERAPEUTIC EXERCISES: CPT

## 2019-04-02 NOTE — FLOWSHEET NOTE
[x] [facility] &  Therapy  [address]  [phone]  F: [phone] [] [address]  [facility]   [address]  P: [phone]  F: [phone] [] [address]  [address] [facility]  P: [phone]  F: [phone] [] [address]  [facility]   [address]  [place]: [phone]  F: [phone]     Physical Therapy Daily Treatment Note    Date:  2019  Patient Name:  Luciano Median    :  1995  MRN: [id number]  Patient: Luciano Median               : 1995                      MRN: [id number]  Physician: Dr. Childers Jam: Luisito Ribeiro Diagnosis: Contusion of left foot                 Rehab Codes: M25.572, R53.1, M25.672, R26.9  Onset date: 19                 Next Dr's appt. : 19  Visit# / total visits: 2 Cancels/No Shows: 0/0    Subjective:    Pain:  [x] Yes  [] No Location: Left foot Pain Rating: (0-10 scale) 0/10  Pain altered Tx:  [] No  [] Yes  Action:  Comments: Patient reports no pain in left foot or difficulty ambulating.      Objective:  Modalities:   Precautions:  Exercises:  Exercise Reps/ Time Weight/ Level Comments   SCIFIT 5 min L5          Standing      Gastroc wedge stretch 3x30\"     Step stretch 3x30\"     Step up 20x 12 in     Squats 20x     Lunges 20x     SL Calf raise 2x20x     SLS  3x30\"     SLS with ball toss 20x 7 in          Seated       Leg press 3x10x 70 lb    4 way ankle  20x ea black                Other:     Specific Instructions for next treatment:PF stretching/strengthening, TG heel raises, ankle strengthening all planes in pain/free motion; test for MWM posterior fibular glide to assess for decreased malleolar pain      Treatment Charges: Mins Units   []  Modalitie     [x] Ther Exercise 40 3   []  Manual Therapy     []  Ther Activities     []  Aquatics     []  Vasocompression     []  Other     Total Treatment time 40 3       Assessment: [x] Progressing toward goals. Good tolerance to all exercise, no pain and limited fatigue. [] No change. [] Other:    Short Term Goals: MEET IN 12 VISITS Status   Pain: Pt will report 0/10 pain at rest and with ambulation and typical daily activity. New   ROM: Pt will improve left ankle AROM to the following degrees without pain at end range for improve gait mechanics and reduced risk for re-injury of foot/ankle: DF to 20deg, PF to 65deg,  New   Strength: Pt will be able to demonstrate 5-/5 strength in all planes of left ankle in order to improve push off strength required for efficient gait. New   Strength: Pt will be able to perform 25 single-limb heel raises on LLE with minimal use of UEs for balance to indicate improved functional PF strength and improvement in condition. New   Function: Pt will be able to ambulate for up to 8 minutes with tennis shoes on with normalized gait mechanics and no pain. New   Outcome Measure: Pt will score greater than or equal to 40% on the LEFS in order to demonstrate improved function of the LLE. New   HEP: Pt will be independent in HEP completion. New            Pt. Education:  [x] Yes  [] No  [x] Reviewed Prior HEP/Ed, new exercises listed above  Method of Education: [x] Verbal  [x] Demo  [] Written  Comprehension of Education:  [x] Verbalizes understanding. [x] Demonstrates understanding. [x] Needs review. [] Demonstrates/verbalizes HEP/Ed previously given. Plan: [x] Continue per plan of care.    [] Other:      Time In:1635          Time Out: 8545    Electronically signed by:  Negra Daley PT

## 2019-04-04 ENCOUNTER — HOSPITAL ENCOUNTER (OUTPATIENT)
Dept: PHYSICAL THERAPY | Age: 24
Setting detail: THERAPIES SERIES
Discharge: HOME | End: 2019-04-04
Payer: COMMERCIAL

## 2019-04-04 PROCEDURE — 97110 THERAPEUTIC EXERCISES: CPT

## 2019-04-04 NOTE — FLOWSHEET NOTE
[x] [facility]) [facility]  [address]  [phone]  F: [phone] [] [address]  [place] [address]   [address]  P: [phone]  F: [phone] [] [place]  [address]  P: [phone]  F: [phone] [] [address]  [facility]   [address]  [place]: [phone]  F: [phone]     Physical Therapy Daily Treatment Note    Date:  2019  Patient Name:  Luciano Median    :  1995  MRN: [id number]  Patient: Luciano Median               : 1995                      MRN: [id number]  Physician: Dr. Childers Jam: Luisito Ribeiro Diagnosis: Contusion of left foot                 Rehab Codes: M25.572, R53.1, M25.672, R26.9  Onset date: 19                 Next 's appt. : 19  Visit# / total visits: 3/ Cancels/No Shows: 0/0    Subjective:    Pain:  [x] Yes  [] No Location: Left foot Pain Rating: (0-10 scale) 0/10  Pain altered Tx:  [] No  [] Yes  Action:  Comments: Patient reports no pain or changes since last visit     Objective:  Modalities:   Precautions:  Exercises:  Exercise Reps/ Time Weight/ Level Comments   SCIFIT  L5    Stationary bike 8 min           Standing      Gastroc wedge stretch 3x30\"     Step stretch 3x30\"     Step up 20x 12 in     Squats on reverse BOSU ball 20x     Lunges on BOSU 20x     BOSU ball SL step ups 20x           SL Calf raise 2x20x     SLS  3x30\"     SLS with ball toss 20x2 2.2 lb    SLS rebounder foam 20x2 2.2 lb          Seated       Leg press 3x10x 70 lb    SL leg press 2x10 30 lb    4 way ankle  20x ea black                Other:     Specific Instructions for next treatment:PF stretching/strengthening, TG heel raises, ankle strengthening all planes in pain/free Demonstrates/verbalizes HEP/Ed previously given. Plan: [x] Continue per plan of care.    [] Other:      Time In: 1540          Time Out: 1620    Electronically signed by:  Giovanny Benites PTA

## 2019-04-09 ENCOUNTER — HOSPITAL ENCOUNTER (OUTPATIENT)
Dept: PHYSICAL THERAPY | Age: 24
Setting detail: THERAPIES SERIES
Discharge: HOME | End: 2019-04-09
Payer: COMMERCIAL

## 2019-04-09 PROCEDURE — 97110 THERAPEUTIC EXERCISES: CPT

## 2019-04-09 NOTE — FLOWSHEET NOTE
[x] [facility]) [facility]  [facility]  [address]  [phone]  F: [phone] [] [address]  [place] [address]   [address]  P: [phone]  F: [phone] [] [place]  [address]  P: [phone]  F: [phone] [] [address]  [facility]   [address]  [place]: [phone]  F: [phone]      Physical Therapy Daily Treatment Note     Date:  2019  Patient Name:  Bell Kathleen               :  1995                     MRN: [id number]  Bernadine Nunez [de-identified]  Physician: Dr. Pati Vizcarra:  [address] of left foot                 Rehab Codes: M25.572, R53.1, M25.672, R26.9  Onset date: 19                               Next 's appt. : 19  Visit# / total visits:         Cancels/No Shows: 0/0     Subjective:    Pain:  [x] Yes  [] No          Location: Left foot       Pain Rating: (0-10 scale) 0/10  Pain altered Tx:  [] No  [] Yes  Action:  Comments: Patient arrives 25 min late.  No pain currently     Objective:  Modalities:   Precautions:  Exercises:  Exercise Reps/ Time Weight/ Level    SCIFIT   L5    Stationary bike 8 min   Held   Elliptical   Start next            Standing        Gastroc wedge stretch 3x30\"      Step stretch 3x30\"      Star excursion       Step up 20x 16 in     Squats on reverse BOSU ball 20x      Lunges on BOSU 20x   Fwd and lat   BOSU ball SL step ups 20x               SL Calf raise 2x20x      SLS with ball toss 20x2 2.2 lb Held   SLS rebounder foam 20x2 2.2 lb             Seated         Leg press 3x10x 70 lb    SL leg press 2x10 30 lb    4 way ankle  20x ea black Held time                  understanding. [] Needs review. [x] Demonstrates/verbalizes HEP/Ed previously given.      Plan:    [x] Continue per plan of care.               [] Other:                            Time In: 5824        Time Out: 5584

## 2019-04-11 ENCOUNTER — HOSPITAL ENCOUNTER (OUTPATIENT)
Dept: PHYSICAL THERAPY | Age: 24
Setting detail: THERAPIES SERIES
Discharge: HOME | End: 2019-04-11
Payer: COMMERCIAL

## 2019-04-11 NOTE — FLOWSHEET NOTE
[x] [address]  [address]    [phone]  F: [phone]   [] [address]  [facility]   [address]  P: [phone]  F: [phone]  [] [place]  [address] [facility]  P: [phone]  F: [phone]   [] [address]  [facility] [address]   [place]: [phone]  F: [phone]  [] [facility]  [address] [facility] [address]  [place]: [phone]   F: [phone]     Physical Therapy Cancel/No Show note    Date: 2019  Patient: Maranda Pang  : 1995  MRN: [id number]    Cancels/No Shows to date:     For today's appointment patient:    [x]  Cancelled    [] Rescheduled appointment    [] No-show      Reason given by patient:    []  Patient ill    []  Conflicting appointment    [] No transportation      [] Conflict with work    [] No reason given    [] Weather related    [] Other:      Comments:  Patient arrived 20 min late, unable to accommodate. patient was rescheduled.        [] Next appointment was confirmed    Electronically signed by: Carly Browne PT

## 2019-04-16 ENCOUNTER — TELEPHONE (OUTPATIENT)
Dept: ORTHOPEDIC SURGERY | Age: 24
End: 2019-04-16

## 2019-04-16 ENCOUNTER — HOSPITAL ENCOUNTER (OUTPATIENT)
Dept: PHYSICAL THERAPY | Age: 24
Setting detail: THERAPIES SERIES
Discharge: HOME | End: 2019-04-16
Payer: COMMERCIAL

## 2019-04-16 NOTE — TELEPHONE ENCOUNTER
I attempted to contact patient however the line was busy multiple times. I called patients contact (mom) and left message for patient to call back to reschedule to an earlier appt today or to reschedule for another day.     Please reschedule accordingly

## 2019-04-16 NOTE — FLOWSHEET NOTE
[x] [address]  [address]    [phone]  F: [phone]   [] [address] [facility] [address]   [address]  P: [phone]  F: [phone]  [] [place]  [address] [facility]  P: [phone]  F: [phone]   [] [address]  [facility] [address]   [place]: [phone]  F: [phone]  [] [place] [address] [facility] [address]  [place]: [phone]   F: [phone]     Physical Therapy Cancel/No Show note    Date: 2019  Patient: Mushtaq Callahan  : 1995  MRN: [id number]    Cancels/No Shows to date:     For today's appointment patient:    []  Cancelled    [] Rescheduled appointment    [x] No-show      Reason given by patient:    []  Patient ill    []  Conflicting appointment    [] No transportation      [] Conflict with work    [] No reason given    [] Weather related    [] Other:      Comments:  Attempted to call patient to change appt time this date; no answer. Left message for mother without return call. Patient then no showed appointment.       [] Next appointment was confirmed    Electronically signed by: Leandra Ridley PT

## 2019-04-18 ENCOUNTER — HOSPITAL ENCOUNTER (OUTPATIENT)
Dept: PHYSICAL THERAPY | Age: 24
Setting detail: THERAPIES SERIES
Discharge: HOME | End: 2019-04-18
Payer: COMMERCIAL

## 2019-04-18 NOTE — DISCHARGE SUMMARY
per our policy. [] Other:         Electronically signed by María Green PT on 4/18/2019 at 4:09 PM      If you have any questions or concerns, please don't hesitate to call.   Thank you for your referral.

## 2019-05-14 ENCOUNTER — OFFICE VISIT (OUTPATIENT)
Dept: PRIMARY CARE CLINIC | Age: 24
End: 2019-05-14
Payer: COMMERCIAL

## 2019-05-14 VITALS
WEIGHT: 146 LBS | DIASTOLIC BLOOD PRESSURE: 74 MMHG | OXYGEN SATURATION: 99 % | SYSTOLIC BLOOD PRESSURE: 124 MMHG | HEART RATE: 57 BPM | BODY MASS INDEX: 20.36 KG/M2

## 2019-05-14 DIAGNOSIS — J30.1 SEASONAL ALLERGIC RHINITIS DUE TO POLLEN: Primary | ICD-10-CM

## 2019-05-14 PROCEDURE — G8420 CALC BMI NORM PARAMETERS: HCPCS | Performed by: INTERNAL MEDICINE

## 2019-05-14 PROCEDURE — G8427 DOCREV CUR MEDS BY ELIG CLIN: HCPCS | Performed by: INTERNAL MEDICINE

## 2019-05-14 PROCEDURE — 99213 OFFICE O/P EST LOW 20 MIN: CPT | Performed by: INTERNAL MEDICINE

## 2019-05-14 PROCEDURE — 1036F TOBACCO NON-USER: CPT | Performed by: INTERNAL MEDICINE

## 2019-05-14 RX ORDER — AZELASTINE HYDROCHLORIDE 0.5 MG/ML
1 SOLUTION/ DROPS OPHTHALMIC 2 TIMES DAILY
Qty: 1 BOTTLE | Refills: 1 | Status: SHIPPED | OUTPATIENT
Start: 2019-05-14 | End: 2019-06-13

## 2019-05-14 RX ORDER — TRIAMCINOLONE ACETONIDE 55 UG/1
2 SPRAY, METERED NASAL DAILY
Qty: 1 INHALER | Refills: 1 | Status: SHIPPED | OUTPATIENT
Start: 2019-05-14

## 2019-05-14 RX ORDER — LORATADINE 10 MG/1
10 TABLET ORAL DAILY
Qty: 30 TABLET | Refills: 1 | Status: SHIPPED | OUTPATIENT
Start: 2019-05-14

## 2019-05-14 ASSESSMENT — ENCOUNTER SYMPTOMS
RHINORRHEA: 1
COUGH: 1

## 2019-05-14 NOTE — PROGRESS NOTES
[facility]  [address]  [address] [facility] [address]  Dept: [phone]  Dept Fax: [phone]    Donna Colon is a 26 y.o. male who presents today for   Chief Complaint   Patient presents with    Allergies    and follow upof chronic medical problems:   Patient Active Problem List   Diagnosis    ADHD (attention deficit hyperactivity disorder)    Seasonal allergic rhinitis due to pollen    Mild intermittent asthma without complication    Pes planus of left foot   . Past Medical History:   Diagnosis Date    ADHD (attention deficit hyperactivity disorder)      and depressive disorder-[place]    Allergic rhinitis     Allergy     seasonal    Asthma     Depression     Headache(784.0)     Scoliosis of lumbar spine 10/1/2012    Vision abnormalities        Past Surgical History:   Procedure Laterality Date    TONSILLECTOMY         Family History   Problem Relation Age of Onset    Asthma Mother     High Blood Pressure Mother     Asthma Maternal Grandmother     Heart Disease Maternal Grandmother     High Blood Pressure Maternal Grandmother     Arthritis Neg Hx     Birth Defects Neg Hx     Cancer Neg Hx     Depression Neg Hx     Diabetes Neg Hx     Early Death Neg Hx     Hearing Loss Neg Hx     Kidney Disease Neg Hx     High Cholesterol Neg Hx     Learning Disabilities Neg Hx     Mental Illness Neg Hx     Mental Retardation Neg Hx     Miscarriages / Stillbirths Neg Hx     Stroke Neg Hx     Substance Abuse Neg Hx     Vision Loss Neg Hx        Social History     Tobacco Use    Smoking status: Never Smoker    Smokeless tobacco: Never Used   Substance Use Topics    Alcohol use:  Yes     Alcohol/week: 0.0 oz     Comment: socially      Current Outpatient Medications   Medication Sig Dispense Refill    ibuprofen (ADVIL;MOTRIN) 800 MG tablet Take 1 tablet by mouth every 6 hours as needed for Pain 20 tablet 0    sodium chloride (OCEAN) 0.65 % nasal spray 1 spray by Nasal route as needed for Congestion 1 Bottle 3    fluticasone (FLONASE) 50 MCG/ACT nasal spray 1 spray by Nasal route daily 1 Bottle 3    cloNIDine (CATAPRES) 0.2 MG tablet Take 0.2 mg by mouth nightly      amphetamine-dextroamphetamine (ADDERALL XR) 20 MG extended release capsule Take 1 capsule by mouth every morning      Omega-3 Fatty Acids (FISH OIL) 1000 MG CAPS       Misc. Devices MISC Use as directed by Physician. Apply CAM boot to the left foot/leg. 1 Device 0    naproxen (NAPROSYN) 500 MG tablet Take 1 tablet by mouth 2 times daily (with meals) 60 tablet 0    diphenhydrAMINE (BENADRYL) 25 MG tablet Take 1 tablet by mouth every 6 hours as needed for Itching or Allergies 30 tablet 0    ondansetron (ZOFRAN) 4 MG tablet Take 1 tablet by mouth every 8 hours as needed for Nausea 10 tablet 0    pantoprazole (PROTONIX) 20 MG tablet Take 1 tablet by mouth daily for 14 days 14 tablet 0    Lactobacillus (PROBIOTIC ACIDOPHILUS) TABS Take 1 tablet by mouth daily 30 tablet 0    diphenhydrAMINE-zinc acetate (BENADRYL EXTRA STRENGTH) 2-0.1 % cream Apply topically 2 times daily as needed for Itching 1 Tube 0    benzoyl peroxide 5 % gel Apply topically daily. 60 g 0     No current facility-administered medications for this visit. No Known Allergies    Health Maintenance   Topic Date Due    DTaP/Tdap/Td vaccine (7 - Td) 03/14/2029    HPV vaccine  Completed    Varicella Vaccine  Completed    Flu vaccine  Completed    Pneumococcal 0-64 years Vaccine  Completed    HIV screen  Completed       Controlled Substances Monitoring:      HPI:     URI    This is a recurrent problem. The current episode started 1 to 4 weeks ago. The problem has been gradually worsening. There has been no fever. Associated symptoms include coughing, rhinorrhea and sneezing. Associated symptoms comments: Eyes watering. Vila Challenger He has tried antihistamine for the symptoms.        ROS:     Review of Systems HENT: Positive for rhinorrhea and sneezing. Respiratory: Positive for cough. Objective:     Physical Exam   Constitutional: He is oriented to person, place, and time. He appears well-developed and well-nourished. HENT:   Head: Normocephalic and atraumatic. Right Ear: External ear normal.   Left Ear: External ear normal.   Mouth/Throat: Oropharynx is clear and moist.   Neurological: He is alert and oriented to person, place, and time. Skin: Skin is warm and dry. Psychiatric: He has a normal mood and affect.  His behavior is normal.

## 2019-05-14 NOTE — PROGRESS NOTES
[facility]  [address]  [address] [facility] [address]  Dept: [phone]  Dept Fax: [phone]    Manan Vieira is a 26 y.o. male who presents today for   Chief Complaint   Patient presents with    Allergies    and follow upof chronic medical problems:   Patient Active Problem List   Diagnosis    ADHD (attention deficit hyperactivity disorder)    Seasonal allergic rhinitis due to pollen    Mild intermittent asthma without complication    Pes planus of left foot   . Past Medical History:   Diagnosis Date    ADHD (attention deficit hyperactivity disorder)      and depressive disorder-[place]    Allergic rhinitis     Allergy     seasonal    Asthma     Depression     Headache(784.0)     Scoliosis of lumbar spine 10/1/2012    Vision abnormalities        Past Surgical History:   Procedure Laterality Date    TONSILLECTOMY         Family History   Problem Relation Age of Onset    Asthma Mother     High Blood Pressure Mother     Asthma Maternal Grandmother     Heart Disease Maternal Grandmother     High Blood Pressure Maternal Grandmother     Arthritis Neg Hx     Birth Defects Neg Hx     Cancer Neg Hx     Depression Neg Hx     Diabetes Neg Hx     Early Death Neg Hx     Hearing Loss Neg Hx     Kidney Disease Neg Hx     High Cholesterol Neg Hx     Learning Disabilities Neg Hx     Mental Illness Neg Hx     Mental Retardation Neg Hx     Miscarriages / Stillbirths Neg Hx     Stroke Neg Hx     Substance Abuse Neg Hx     Vision Loss Neg Hx        Social History     Tobacco Use    Smoking status: Never Smoker    Smokeless tobacco: Never Used   Substance Use Topics    Alcohol use:  Yes     Alcohol/week: 0.0 oz     Comment: socially      Current Outpatient Medications   Medication Sig Dispense Refill    ibuprofen (ADVIL;MOTRIN) 800 MG tablet Take 1 tablet by mouth every 6 hours as needed for Pain 20 tablet 0    sodium chloride (OCEAN) 0.65 % nasal spray 1 spray by Nasal route as needed for Congestion 1 Bottle 3    fluticasone (FLONASE) 50 MCG/ACT nasal spray 1 spray by Nasal route daily 1 Bottle 3    cloNIDine (CATAPRES) 0.2 MG tablet Take 0.2 mg by mouth nightly      amphetamine-dextroamphetamine (ADDERALL XR) 20 MG extended release capsule Take 1 capsule by mouth every morning      Omega-3 Fatty Acids (FISH OIL) 1000 MG CAPS       Misc. Devices MISC Use as directed by Physician. Apply CAM boot to the left foot/leg. 1 Device 0    naproxen (NAPROSYN) 500 MG tablet Take 1 tablet by mouth 2 times daily (with meals) 60 tablet 0    diphenhydrAMINE (BENADRYL) 25 MG tablet Take 1 tablet by mouth every 6 hours as needed for Itching or Allergies 30 tablet 0    ondansetron (ZOFRAN) 4 MG tablet Take 1 tablet by mouth every 8 hours as needed for Nausea 10 tablet 0    pantoprazole (PROTONIX) 20 MG tablet Take 1 tablet by mouth daily for 14 days 14 tablet 0    Lactobacillus (PROBIOTIC ACIDOPHILUS) TABS Take 1 tablet by mouth daily 30 tablet 0    diphenhydrAMINE-zinc acetate (BENADRYL EXTRA STRENGTH) 2-0.1 % cream Apply topically 2 times daily as needed for Itching 1 Tube 0    benzoyl peroxide 5 % gel Apply topically daily. 60 g 0     No current facility-administered medications for this visit. No Known Allergies    Health Maintenance   Topic Date Due    DTaP/Tdap/Td vaccine (7 - Td) 03/14/2029    HPV vaccine  Completed    Varicella Vaccine  Completed    Flu vaccine  Completed    Pneumococcal 0-64 years Vaccine  Completed    HIV screen  Completed       Controlled Substances Monitoring:      HPI:     HPI    ROS:     Review of Systems    Objective:     Physical Exam   Visit Information    Have you changed or started any medications since your last visit including any over-the-counter medicines, vitamins, or herbal medicines?  no   Are you having any side effects from any of your medications? -  no  Have you stopped taking any of your medications? Is so, why? -  no    Have you seen any other physician or provider since your last visit? No  Have you had any other diagnostic tests since your last visit? No  Have you been seen in the emergency room and/or had an admission to a hospital since we last saw you? No  Have you had your routine dental cleaning in the past 6 months? no    Have you activated your [employer] account? If not, what are your barriers?  Yes     Patient Care Team:  Stefanie Castro MD as PCP - Eladia Ruffin MD as PCP - S Attributed Provider    Medical History Review  Past Medical, Family, and Social History reviewed and does contribute to the patient presenting condition    Health Maintenance   Topic Date Due    DTaP/Tdap/Td vaccine (7 - Td) 03/14/2029    HPV vaccine  Completed    Varicella Vaccine  Completed    Flu vaccine  Completed    Pneumococcal 0-64 years Vaccine  Completed    HIV screen  Completed

## 2019-05-17 ENCOUNTER — OFFICE VISIT (OUTPATIENT)
Dept: PRIMARY CARE CLINIC | Age: 24
End: 2019-05-17
Payer: COMMERCIAL

## 2019-05-17 VITALS
SYSTOLIC BLOOD PRESSURE: 118 MMHG | WEIGHT: 148 LBS | HEART RATE: 59 BPM | BODY MASS INDEX: 20.64 KG/M2 | DIASTOLIC BLOOD PRESSURE: 70 MMHG | OXYGEN SATURATION: 100 %

## 2019-05-17 DIAGNOSIS — H10.33 ACUTE CONJUNCTIVITIS OF BOTH EYES, UNSPECIFIED ACUTE CONJUNCTIVITIS TYPE: Primary | ICD-10-CM

## 2019-05-17 PROCEDURE — G8420 CALC BMI NORM PARAMETERS: HCPCS | Performed by: NURSE PRACTITIONER

## 2019-05-17 PROCEDURE — 99202 OFFICE O/P NEW SF 15 MIN: CPT | Performed by: NURSE PRACTITIONER

## 2019-05-17 PROCEDURE — G8427 DOCREV CUR MEDS BY ELIG CLIN: HCPCS | Performed by: NURSE PRACTITIONER

## 2019-05-17 PROCEDURE — 1036F TOBACCO NON-USER: CPT | Performed by: NURSE PRACTITIONER

## 2019-05-17 RX ORDER — OFLOXACIN 3 MG/ML
1 SOLUTION/ DROPS OPHTHALMIC 4 TIMES DAILY
Qty: 1 BOTTLE | Refills: 0 | Status: SHIPPED | OUTPATIENT
Start: 2019-05-17 | End: 2019-05-22

## 2019-05-17 RX ORDER — POLYMYXIN B SULFATE AND TRIMETHOPRIM 1; 10000 MG/ML; [USP'U]/ML
1 SOLUTION OPHTHALMIC EVERY 4 HOURS
Qty: 1 BOTTLE | Refills: 0 | Status: SHIPPED | OUTPATIENT
Start: 2019-05-17 | End: 2019-05-17

## 2019-05-17 ASSESSMENT — ENCOUNTER SYMPTOMS
DIARRHEA: 0
BACK PAIN: 0
ABDOMINAL PAIN: 0
SORE THROAT: 0
EYE PAIN: 0
EYE ITCHING: 1
COUGH: 0
EYE DISCHARGE: 1
SINUS PAIN: 0
SHORTNESS OF BREATH: 0
NAUSEA: 0
EYE REDNESS: 1
VOMITING: 0

## 2019-05-17 NOTE — PROGRESS NOTES
times daily (with meals) 60 tablet 0    ibuprofen (ADVIL;MOTRIN) 800 MG tablet Take 1 tablet by mouth every 6 hours as needed for Pain 20 tablet 0    diphenhydrAMINE (BENADRYL) 25 MG tablet Take 1 tablet by mouth every 6 hours as needed for Itching or Allergies 30 tablet 0    ondansetron (ZOFRAN) 4 MG tablet Take 1 tablet by mouth every 8 hours as needed for Nausea 10 tablet 0    Lactobacillus (PROBIOTIC ACIDOPHILUS) TABS Take 1 tablet by mouth daily 30 tablet 0    sodium chloride (OCEAN) 0.65 % nasal spray 1 spray by Nasal route as needed for Congestion 1 Bottle 3    fluticasone (FLONASE) 50 MCG/ACT nasal spray 1 spray by Nasal route daily 1 Bottle 3    diphenhydrAMINE-zinc acetate (BENADRYL EXTRA STRENGTH) 2-0.1 % cream Apply topically 2 times daily as needed for Itching 1 Tube 0    cloNIDine (CATAPRES) 0.2 MG tablet Take 0.2 mg by mouth nightly      benzoyl peroxide 5 % gel Apply topically daily. 60 g 0    amphetamine-dextroamphetamine (ADDERALL XR) 20 MG extended release capsule Take 1 capsule by mouth every morning      Omega-3 Fatty Acids (FISH OIL) 1000 MG CAPS       pantoprazole (PROTONIX) 20 MG tablet Take 1 tablet by mouth daily for 14 days 14 tablet 0     No current facility-administered medications for this visit. No Known Allergies    Subjective:      Review of Systems   Constitutional: Negative for chills and fatigue. HENT: Negative for congestion, ear pain, sinus pain and sore throat. Eyes: Positive for discharge, redness and itching. Negative for pain and visual disturbance. Respiratory: Negative for cough and shortness of breath. Cardiovascular: Negative for chest pain and palpitations. Gastrointestinal: Negative for abdominal pain, diarrhea, nausea and vomiting. Musculoskeletal: Negative for back pain, joint swelling and neck pain. Skin: Negative for rash. Neurological: Negative for dizziness and light-headedness.    All other systems reviewed and are

## 2019-05-17 NOTE — LETTER
[address] [facility]  [facility] [address]  Phone: [phone]  Fax: [phone]    GEOFF Delcid CNP        May 17, 2019     Patient: Hipolito Bravo   YOB: 1995   Date of Visit: 5/17/2019       To Whom It May Concern: It is my medical opinion that Justine Butler is to be excused from work through 5/17/2019. If you have any questions or concerns, please don't hesitate to call.     Sincerely,        GEOFF Delcid CNP

## 2019-05-17 NOTE — PATIENT INSTRUCTIONS
Patient Education        Pinkeye: Care Instructions  Your Care Instructions    Pinkeye is redness and swelling of the eye surface and the conjunctiva (the lining of the eyelid and the covering of the white part of the eye). Pinkeye is also called conjunctivitis. Pinkeye is often caused by infection with bacteria or a virus. Dry air, allergies, smoke, and chemicals are other common causes. Pinkeye often clears on its own in 7 to 10 days. Antibiotics only help if the pinkeye is caused by bacteria. Pinkeye caused by infection spreads easily. If an allergy or chemical is causing pinkeye, it will not go away unless you can avoid whatever is causing it. Follow-up care is a key part of your treatment and safety. Be sure to make and go to all appointments, and call your doctor if you are having problems. It's also a good idea to know your test results and keep a list of the medicines you take. How can you care for yourself at home? · Wash your hands often. Always wash them before and after you treat pinkeye or touch your eyes or face. · Use moist cotton or a clean, wet cloth to remove crust. Wipe from the inside corner of the eye to the outside. Use a clean part of the cloth for each wipe. · Put cold or warm wet cloths on your eye a few times a day if the eye hurts. · Do not wear contact lenses or eye makeup until the pinkeye is gone. Throw away any eye makeup you were using when you got pinkeye. Clean your contacts and storage case. If you wear disposable contacts, use a new pair when your eye has cleared and it is safe to wear contacts again. · If the doctor gave you antibiotic ointment or eyedrops, use them as directed. Use the medicine for as long as instructed, even if your eye starts looking better soon. Keep the bottle tip clean, and do not let it touch the eye area. · To put in eyedrops or ointment:  ? Tilt your head back, and pull your lower eyelid down with one finger. ?  Drop or squirt the medicine

## 2019-05-28 ENCOUNTER — HOSPITAL ENCOUNTER (OUTPATIENT)
Age: 24
Discharge: HOME | End: 2019-05-28
Payer: COMMERCIAL

## 2019-05-28 ENCOUNTER — OFFICE VISIT (OUTPATIENT)
Dept: ORTHOPEDIC SURGERY | Age: 24
End: 2019-05-28
Payer: COMMERCIAL

## 2019-05-28 ENCOUNTER — HOSPITAL ENCOUNTER (OUTPATIENT)
Age: 24
Setting detail: SPECIMEN
Discharge: HOME | End: 2019-05-28
Payer: COMMERCIAL

## 2019-05-28 ENCOUNTER — OFFICE VISIT (OUTPATIENT)
Dept: PRIMARY CARE CLINIC | Age: 24
End: 2019-05-28
Payer: COMMERCIAL

## 2019-05-28 VITALS
SYSTOLIC BLOOD PRESSURE: 116 MMHG | BODY MASS INDEX: 20.5 KG/M2 | HEART RATE: 64 BPM | DIASTOLIC BLOOD PRESSURE: 71 MMHG | WEIGHT: 147 LBS | OXYGEN SATURATION: 99 %

## 2019-05-28 VITALS — HEIGHT: 71 IN | WEIGHT: 147.05 LBS | BODY MASS INDEX: 20.59 KG/M2

## 2019-05-28 DIAGNOSIS — Z20.2 POSSIBLE EXPOSURE TO STD: Primary | ICD-10-CM

## 2019-05-28 DIAGNOSIS — Z20.2 POSSIBLE EXPOSURE TO STD: ICD-10-CM

## 2019-05-28 DIAGNOSIS — S90.32XD CONTUSION OF LEFT FOOT, SUBSEQUENT ENCOUNTER: Primary | ICD-10-CM

## 2019-05-28 LAB
HEPATITIS C ANTIBODY: NONREACTIVE
HIV AG/AB: NONREACTIVE
T. PALLIDUM, IGG: NONREACTIVE

## 2019-05-28 PROCEDURE — 99213 OFFICE O/P EST LOW 20 MIN: CPT | Performed by: ORTHOPAEDIC SURGERY

## 2019-05-28 PROCEDURE — 87591 N.GONORRHOEAE DNA AMP PROB: CPT

## 2019-05-28 PROCEDURE — 36415 COLL VENOUS BLD VENIPUNCTURE: CPT

## 2019-05-28 PROCEDURE — G8420 CALC BMI NORM PARAMETERS: HCPCS | Performed by: ORTHOPAEDIC SURGERY

## 2019-05-28 PROCEDURE — G8427 DOCREV CUR MEDS BY ELIG CLIN: HCPCS | Performed by: INTERNAL MEDICINE

## 2019-05-28 PROCEDURE — 86803 HEPATITIS C AB TEST: CPT

## 2019-05-28 PROCEDURE — 86780 TREPONEMA PALLIDUM: CPT

## 2019-05-28 PROCEDURE — G8427 DOCREV CUR MEDS BY ELIG CLIN: HCPCS | Performed by: ORTHOPAEDIC SURGERY

## 2019-05-28 PROCEDURE — 1036F TOBACCO NON-USER: CPT | Performed by: ORTHOPAEDIC SURGERY

## 2019-05-28 PROCEDURE — 87389 HIV-1 AG W/HIV-1&-2 AB AG IA: CPT

## 2019-05-28 PROCEDURE — 1036F TOBACCO NON-USER: CPT | Performed by: INTERNAL MEDICINE

## 2019-05-28 PROCEDURE — 99213 OFFICE O/P EST LOW 20 MIN: CPT | Performed by: INTERNAL MEDICINE

## 2019-05-28 PROCEDURE — G8420 CALC BMI NORM PARAMETERS: HCPCS | Performed by: INTERNAL MEDICINE

## 2019-05-28 ASSESSMENT — ENCOUNTER SYMPTOMS
EYE PAIN: 0
ABDOMINAL PAIN: 0
SHORTNESS OF BREATH: 0

## 2019-05-28 NOTE — PROGRESS NOTES
[address] [facility] [address] [place] [address]  Dept: [phone]  Review of Systems    Name: Hipolito Bravo   : 1995    Date: 2019     Review of Systems   Constitutional: Negative for fever. HENT: Negative for tinnitus. Eyes: Negative for pain. Respiratory: Negative for shortness of breath. Cardiovascular: Negative for chest pain. Gastrointestinal: Negative for abdominal pain. Genitourinary: Negative for dysuria. Skin: Negative for rash. Neurological: Negative for headaches. Hematological: Does not bruise/bleed easily.      Musculoskeletal: See HPI for pertinent positives

## 2019-05-28 NOTE — PROGRESS NOTES
[facility]  [address]  [address] [facility] [address]  Dept: [phone]  Dept Fax: [phone]    Jossie Morris is a 26 y.o. male who presents today for   Chief Complaint   Patient presents with   Nick Mcgraw     pt states he would like to get check for STD's    and follow upof chronic medical problems:   Patient Active Problem List   Diagnosis    ADHD (attention deficit hyperactivity disorder)    Seasonal allergic rhinitis due to pollen    Mild intermittent asthma without complication    Pes planus of left foot   . Past Medical History:   Diagnosis Date    ADHD (attention deficit hyperactivity disorder)      and depressive disorder-[place]    Allergic rhinitis     Allergy     seasonal    Asthma     Depression     Headache(784.0)     Scoliosis of lumbar spine 10/1/2012    Vision abnormalities        Past Surgical History:   Procedure Laterality Date    TONSILLECTOMY         Family History   Problem Relation Age of Onset    Asthma Mother     High Blood Pressure Mother     Asthma Maternal Grandmother     Heart Disease Maternal Grandmother     High Blood Pressure Maternal Grandmother     Arthritis Neg Hx     Birth Defects Neg Hx     Cancer Neg Hx     Depression Neg Hx     Diabetes Neg Hx     Early Death Neg Hx     Hearing Loss Neg Hx     Kidney Disease Neg Hx     High Cholesterol Neg Hx     Learning Disabilities Neg Hx     Mental Illness Neg Hx     Mental Retardation Neg Hx     Miscarriages / Stillbirths Neg Hx     Stroke Neg Hx     Substance Abuse Neg Hx     Vision Loss Neg Hx        Social History     Tobacco Use    Smoking status: Never Smoker    Smokeless tobacco: Never Used   Substance Use Topics    Alcohol use:  Yes     Alcohol/week: 0.0 oz     Comment: socially      Current Outpatient Medications   Medication Sig Dispense Refill    loratadine (CLARITIN) 10 MG tablet Take 1 tablet by mouth daily 30 tablet 1  azelastine (OPTIVAR) 0.05 % ophthalmic solution Place 1 drop into both eyes 2 times daily 1 Bottle 1    triamcinolone (NASACORT) 55 MCG/ACT nasal inhaler 2 sprays by Nasal route daily 1 Inhaler 1    Misc. Devices MISC Use as directed by Physician. Apply CAM boot to the left foot/leg. 1 Device 0    naproxen (NAPROSYN) 500 MG tablet Take 1 tablet by mouth 2 times daily (with meals) 60 tablet 0    ibuprofen (ADVIL;MOTRIN) 800 MG tablet Take 1 tablet by mouth every 6 hours as needed for Pain 20 tablet 0    diphenhydrAMINE (BENADRYL) 25 MG tablet Take 1 tablet by mouth every 6 hours as needed for Itching or Allergies 30 tablet 0    ondansetron (ZOFRAN) 4 MG tablet Take 1 tablet by mouth every 8 hours as needed for Nausea 10 tablet 0    Lactobacillus (PROBIOTIC ACIDOPHILUS) TABS Take 1 tablet by mouth daily 30 tablet 0    sodium chloride (OCEAN) 0.65 % nasal spray 1 spray by Nasal route as needed for Congestion 1 Bottle 3    fluticasone (FLONASE) 50 MCG/ACT nasal spray 1 spray by Nasal route daily 1 Bottle 3    diphenhydrAMINE-zinc acetate (BENADRYL EXTRA STRENGTH) 2-0.1 % cream Apply topically 2 times daily as needed for Itching 1 Tube 0    cloNIDine (CATAPRES) 0.2 MG tablet Take 0.2 mg by mouth nightly      benzoyl peroxide 5 % gel Apply topically daily. 60 g 0    amphetamine-dextroamphetamine (ADDERALL XR) 20 MG extended release capsule Take 1 capsule by mouth every morning      Omega-3 Fatty Acids (FISH OIL) 1000 MG CAPS       pantoprazole (PROTONIX) 20 MG tablet Take 1 tablet by mouth daily for 14 days 14 tablet 0     No current facility-administered medications for this visit.       No Known Allergies    Health Maintenance   Topic Date Due    DTaP/Tdap/Td vaccine (7 - Td) 03/14/2029    HPV vaccine  Completed    Varicella Vaccine  Completed    Flu vaccine  Completed    Pneumococcal 0-64 years Vaccine  Completed    HIV screen  Completed       Controlled Substances Monitoring:      HPI: HPI    ROS:     Review of Systems    Objective:     Physical Exam   Visit Information    Have you changed or started any medications since your last visit including any over-the-counter medicines, vitamins, or herbal medicines? no   Are you having any side effects from any of your medications? -  no  Have you stopped taking any of your medications? Is so, why? -  no    Have you seen any other physician or provider since your last visit? No  Have you had any other diagnostic tests since your last visit? No  Have you been seen in the emergency room and/or had an admission to a hospital since we last saw you? No  Have you had your routine dental cleaning in the past 6 months? no    Have you activated your [employer] account? If not, what are your barriers?  Yes     Patient Care Team:  Gogo Gordon MD as PCP - Dawne Sicard, MD as PCP - S Attributed Provider    Medical History Review  Past Medical, Family, and Social History reviewed and does not contribute to the patient presenting condition    Health Maintenance   Topic Date Due    DTaP/Tdap/Td vaccine (7 - Td) 03/14/2029    HPV vaccine  Completed    Varicella Vaccine  Completed    Flu vaccine  Completed    Pneumococcal 0-64 years Vaccine  Completed    HIV screen  Completed

## 2019-05-28 NOTE — PROGRESS NOTES
[facility]  [address]  [address] [facility] [address]  Dept: [phone]  Dept Fax: [phone]    Luciano Hernandez is a 26 y.o. male who presents today for   Chief Complaint   Patient presents with   Amanuel Harmon     pt states he would like to get check for STD's    and follow upof chronic medical problems:   Patient Active Problem List   Diagnosis    ADHD (attention deficit hyperactivity disorder)    Seasonal allergic rhinitis due to pollen    Mild intermittent asthma without complication    Pes planus of left foot   . Past Medical History:   Diagnosis Date    ADHD (attention deficit hyperactivity disorder)      and depressive disorder-[place]    Allergic rhinitis     Allergy     seasonal    Asthma     Depression     Headache(784.0)     Scoliosis of lumbar spine 10/1/2012    Vision abnormalities        Past Surgical History:   Procedure Laterality Date    TONSILLECTOMY         Family History   Problem Relation Age of Onset    Asthma Mother     High Blood Pressure Mother     Asthma Maternal Grandmother     Heart Disease Maternal Grandmother     High Blood Pressure Maternal Grandmother     Arthritis Neg Hx     Birth Defects Neg Hx     Cancer Neg Hx     Depression Neg Hx     Diabetes Neg Hx     Early Death Neg Hx     Hearing Loss Neg Hx     Kidney Disease Neg Hx     High Cholesterol Neg Hx     Learning Disabilities Neg Hx     Mental Illness Neg Hx     Mental Retardation Neg Hx     Miscarriages / Stillbirths Neg Hx     Stroke Neg Hx     Substance Abuse Neg Hx     Vision Loss Neg Hx        Social History     Tobacco Use    Smoking status: Never Smoker    Smokeless tobacco: Never Used   Substance Use Topics    Alcohol use:  Yes     Alcohol/week: 0.0 oz     Comment: socially      Current Outpatient Medications   Medication Sig Dispense Refill    loratadine (CLARITIN) 10 MG tablet Take 1 tablet by mouth daily 30 tablet 1  azelastine (OPTIVAR) 0.05 % ophthalmic solution Place 1 drop into both eyes 2 times daily 1 Bottle 1    triamcinolone (NASACORT) 55 MCG/ACT nasal inhaler 2 sprays by Nasal route daily 1 Inhaler 1    Misc. Devices MISC Use as directed by Physician. Apply CAM boot to the left foot/leg. 1 Device 0    naproxen (NAPROSYN) 500 MG tablet Take 1 tablet by mouth 2 times daily (with meals) 60 tablet 0    ibuprofen (ADVIL;MOTRIN) 800 MG tablet Take 1 tablet by mouth every 6 hours as needed for Pain 20 tablet 0    diphenhydrAMINE (BENADRYL) 25 MG tablet Take 1 tablet by mouth every 6 hours as needed for Itching or Allergies 30 tablet 0    ondansetron (ZOFRAN) 4 MG tablet Take 1 tablet by mouth every 8 hours as needed for Nausea 10 tablet 0    Lactobacillus (PROBIOTIC ACIDOPHILUS) TABS Take 1 tablet by mouth daily 30 tablet 0    sodium chloride (OCEAN) 0.65 % nasal spray 1 spray by Nasal route as needed for Congestion 1 Bottle 3    fluticasone (FLONASE) 50 MCG/ACT nasal spray 1 spray by Nasal route daily 1 Bottle 3    diphenhydrAMINE-zinc acetate (BENADRYL EXTRA STRENGTH) 2-0.1 % cream Apply topically 2 times daily as needed for Itching 1 Tube 0    cloNIDine (CATAPRES) 0.2 MG tablet Take 0.2 mg by mouth nightly      benzoyl peroxide 5 % gel Apply topically daily. 60 g 0    amphetamine-dextroamphetamine (ADDERALL XR) 20 MG extended release capsule Take 1 capsule by mouth every morning      Omega-3 Fatty Acids (FISH OIL) 1000 MG CAPS       pantoprazole (PROTONIX) 20 MG tablet Take 1 tablet by mouth daily for 14 days 14 tablet 0     No current facility-administered medications for this visit.       No Known Allergies    Health Maintenance   Topic Date Due    DTaP/Tdap/Td vaccine (7 - Td) 03/14/2029    HPV vaccine  Completed    Varicella Vaccine  Completed    Flu vaccine  Completed    Pneumococcal 0-64 years Vaccine  Completed    HIV screen  Completed       Controlled Substances Monitoring:      HPI:

## 2019-05-29 LAB
C. TRACHOMATIS DNA ,URINE: NEGATIVE
N. GONORRHOEAE DNA, URINE: NEGATIVE
N. GONORRHOEAE DNA, URINE: NEGATIVE
SPECIMEN DESCRIPTION: NORMAL
SPECIMEN DESCRIPTION: NORMAL

## 2019-06-02 ASSESSMENT — ENCOUNTER SYMPTOMS
ABDOMINAL PAIN: 0
EYE PAIN: 0
SHORTNESS OF BREATH: 0

## 2019-06-02 NOTE — PROGRESS NOTES
[facility]  [facility]  [address] [place], [place] [address] [address] [address]  Dept: [phone]    Ambulatory Orthopedic Consult      CHIEF COMPLAINT:    Chief Complaint   Patient presents with    Follow-up     L Foot       HISTORY OF PRESENT ILLNESS:      The patient is a 26 y.o. male who is being seen for consultation and evaluation of left foot pain that began on 3/10/19, with the patient reporting that he woke up with the foot pain, but denies any specific incident. He does report that he \"fights in his sleep\", but is unsure if he kicked a wall and is unable to explain anything further. The patient localizes the pain diffusely to the lateral hindfoot, Achilles, and medial hindfoot. Prior to being seen here today, the patient was seen in the emergency department. The patient reports an associated swelling yesterday, which he reports has resolved today. The pain is worse with activity and better with rest. The pain has slightly improved since the injury. INTERVAL HISTORY 5/28/2019:  He is seen again today in the office for follow up, having been seen here last 3/12/19. Since being seen last, he reports the problem has significantly improved to the point where he denies any pain. He is ambulating with regular shoes without assistive device or ankle support. He denies any other significant changes. REVIEW OF SYSTEMS:  Review of Systems   Constitutional: Negative for fever. HENT: Negative for tinnitus. Eyes: Negative for pain. Respiratory: Negative for shortness of breath. Cardiovascular: Negative for chest pain. Gastrointestinal: Negative for abdominal pain. Genitourinary: Negative for dysuria. Skin: Negative for rash. Neurological: Negative for headaches. Hematological: Does not bruise/bleed easily.      Musculoskeletal: See HPI for pertinent positives     Past Medical History:    Past Medical History:   Diagnosis Date    ADHD (attention deficit hyperactivity disorder) and depressive disorder-[place]    Allergic rhinitis     Allergy     seasonal    Asthma     Depression     Headache(784.0)     Scoliosis of lumbar spine 10/1/2012    Vision abnormalities        Past Surgical History:    Past Surgical History:   Procedure Laterality Date    TONSILLECTOMY         Current Medications:   Current Outpatient Medications   Medication Sig Dispense Refill    loratadine (CLARITIN) 10 MG tablet Take 1 tablet by mouth daily 30 tablet 1    azelastine (OPTIVAR) 0.05 % ophthalmic solution Place 1 drop into both eyes 2 times daily 1 Bottle 1    triamcinolone (NASACORT) 55 MCG/ACT nasal inhaler 2 sprays by Nasal route daily 1 Inhaler 1    Misc. Devices MISC Use as directed by Physician. Apply CAM boot to the left foot/leg. 1 Device 0    naproxen (NAPROSYN) 500 MG tablet Take 1 tablet by mouth 2 times daily (with meals) 60 tablet 0    ibuprofen (ADVIL;MOTRIN) 800 MG tablet Take 1 tablet by mouth every 6 hours as needed for Pain 20 tablet 0    diphenhydrAMINE (BENADRYL) 25 MG tablet Take 1 tablet by mouth every 6 hours as needed for Itching or Allergies 30 tablet 0    ondansetron (ZOFRAN) 4 MG tablet Take 1 tablet by mouth every 8 hours as needed for Nausea 10 tablet 0    pantoprazole (PROTONIX) 20 MG tablet Take 1 tablet by mouth daily for 14 days 14 tablet 0    Lactobacillus (PROBIOTIC ACIDOPHILUS) TABS Take 1 tablet by mouth daily 30 tablet 0    sodium chloride (OCEAN) 0.65 % nasal spray 1 spray by Nasal route as needed for Congestion 1 Bottle 3    fluticasone (FLONASE) 50 MCG/ACT nasal spray 1 spray by Nasal route daily 1 Bottle 3    diphenhydrAMINE-zinc acetate (BENADRYL EXTRA STRENGTH) 2-0.1 % cream Apply topically 2 times daily as needed for Itching 1 Tube 0    cloNIDine (CATAPRES) 0.2 MG tablet Take 0.2 mg by mouth nightly      benzoyl peroxide 5 % gel Apply topically daily.  60 g 0    amphetamine-dextroamphetamine (ADDERALL XR) 20 MG extended release capsule Take 1 capsule by mouth every morning      Omega-3 Fatty Acids (FISH OIL) 1000 MG CAPS        No current facility-administered medications for this visit. Allergies:    Patient has no known allergies. Family History:  Family History   Problem Relation Age of Onset    Asthma Mother     High Blood Pressure Mother     Asthma Maternal Grandmother     Heart Disease Maternal Grandmother     High Blood Pressure Maternal Grandmother     Arthritis Neg Hx     Birth Defects Neg Hx     Cancer Neg Hx     Depression Neg Hx     Diabetes Neg Hx     Early Death Neg Hx     Hearing Loss Neg Hx     Kidney Disease Neg Hx     High Cholesterol Neg Hx     Learning Disabilities Neg Hx     Mental Illness Neg Hx     Mental Retardation Neg Hx     Miscarriages / Stillbirths Neg Hx     Stroke Neg Hx     Substance Abuse Neg Hx     Vision Loss Neg Hx        Social History:   Social History     Socioeconomic History    Marital status: Single     Spouse name: Not on file    Number of children: Not on file    Years of education: Not on file    Highest education level: Not on file   Occupational History    Not on file   Social Needs    Financial resource strain: Not on file    Food insecurity:     Worry: Not on file     Inability: Not on file    Transportation needs:     Medical: Not on file     Non-medical: Not on file   Tobacco Use    Smoking status: Never Smoker    Smokeless tobacco: Never Used   Substance and Sexual Activity    Alcohol use:  Yes     Alcohol/week: 0.0 oz     Comment: socially    Drug use: No    Sexual activity: Not on file   Lifestyle    Physical activity:     Days per week: Not on file     Minutes per session: Not on file    Stress: Not on file   Relationships    Social connections:     Talks on phone: Not on file     Gets together: Not on file     Attends [employer] service: Not on file     Active member of club or organization: Not on file     Attends meetings of clubs or organizations: Not on file     Relationship status: Not on file    Intimate partner violence:     Fear of current or ex partner: Not on file     Emotionally abused: Not on file     Physically abused: Not on file     Forced sexual activity: Not on file   Other Topics Concern    Not on file   Social History Narrative    Not on file     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 Skin:  Intact over ankle without rash/lesions/AV malformations. Strength: Able to fire the following:    [x]  Tib Ant:     [x]  Gastroc-Soleus:          [x]  Inversion:    [x]  Eversion:       [x]  FHL:     [x]  EHL:       -Ankle and subtalar range of motion within normal limits, painless  -No tenderness at knee or proximal leg or throughout lower extremity over bony prominences    RADIOLOGY:   5/28/2019 No new radiology images today. Prior images reviewed for reference. FINDINGS:  Three weightbearing views (AP, Mortise, and Lateral) of the left ankle and three weightbearing views (AP, Oblique, Lateral) of the left foot were obtained in the office today and reviewed, revealing no acute fracture, dislocation. The ankle mortise is maintained with no widening of the clear spaces. Overall alignment is satisfactory. Benign-appearing distal tibial osseous lesion with well-defined margins, no periosteal reaction or endosteal scalloping.   Pes planus with decreased calcaneal pitch.    IMPRESSION:    No acute fracture/dislocation. pes planus. Result Date: 3/8/2019  No acute abnormality of the ankle. 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 Pes planus       ASSESSMENT AND PLAN:  Meliza Celaya was seen today for Follow-up (L Foot)  The encounter diagnosis was Contusion of left foot, subsequent encounter. Body mass index is 20.52 kg/m². He had a left foot contusion, resolved with conservative treatment, with underlying bilateral pes planus. At this point, he does not need any further treatment for this problem as it is resolved. He may weight-bear as tolerated, and avoid pain provoking activity. He does not need physical therapy, as he has good strength and has resumed full activity. All questions were answered and the patient agrees with the above plan. The patient will return to clinic PRN. No follow-ups on file. No orders of the defined types were placed in this encounter. No orders of the defined types were placed in this encounter. Phil Hedrick MD  Orthopedic Surgery, Foot and Ankle        Please excuse any typos/errors, as this note was created with the assistance of voice recognition software. While intending to generate a document that actually reflects the content of the visit, the document can still have some errors including those of syntax and sound-a-like substitutions which may escape proof reading. In such instances, actual meaning can be extrapolated by context.

## 2019-07-18 ENCOUNTER — OFFICE VISIT (OUTPATIENT)
Dept: PRIMARY CARE CLINIC | Age: 24
End: 2019-07-18
Payer: COMMERCIAL

## 2019-07-18 VITALS
BODY MASS INDEX: 20.37 KG/M2 | WEIGHT: 146 LBS | DIASTOLIC BLOOD PRESSURE: 69 MMHG | HEART RATE: 62 BPM | OXYGEN SATURATION: 98 % | SYSTOLIC BLOOD PRESSURE: 113 MMHG
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
## 2019-07-18 DIAGNOSIS — L03.011 PARONYCHIA OF FINGER, RIGHT: Primary | ICD-10-CM

## 2019-07-18 PROCEDURE — 99213 OFFICE O/P EST LOW 20 MIN: CPT | Performed by: NURSE PRACTITIONER

## 2019-07-18 PROCEDURE — 10060 I&D ABSCESS SIMPLE/SINGLE: CPT | Performed by: NURSE PRACTITIONER

## 2019-07-18 PROCEDURE — G8420 CALC BMI NORM PARAMETERS: HCPCS | Performed by: NURSE PRACTITIONER

## 2019-07-18 PROCEDURE — 1036F TOBACCO NON-USER: CPT | Performed by: NURSE PRACTITIONER

## 2019-07-18 PROCEDURE — G8427 DOCREV CUR MEDS BY ELIG CLIN: HCPCS | Performed by: NURSE PRACTITIONER

## 2019-07-18 RX ORDER — AMOXICILLIN AND CLAVULANATE POTASSIUM 875; 125 MG/1; MG/1
1 TABLET, FILM COATED ORAL 2 TIMES DAILY
Qty: 20 TABLET | Refills: 0 | Status: SHIPPED | OUTPATIENT
Start: 2019-07-18 | End: 2019-07-28

## 2019-07-18 RX ORDER — TRIAMCINOLONE ACETONIDE 1 MG/G
CREAM TOPICAL
Qty: 30 G | Refills: 0 | Status: SHIPPED | OUTPATIENT
Start: 2019-07-18 | End: 2020-03-03

## 2019-07-18 ASSESSMENT — ENCOUNTER SYMPTOMS
ABDOMINAL PAIN: 0
EYE PAIN: 0
NAUSEA: 0
SINUS PAIN: 0
SORE THROAT: 0
COUGH: 0
SHORTNESS OF BREATH: 0
BACK PAIN: 0
VOMITING: 0
DIARRHEA: 0

## 2019-07-18 NOTE — PROGRESS NOTES
Visit Information    Have you changed or started any medications since your last visit including any over-the-counter medicines, vitamins, or herbal medicines? no   Are you having any side effects from any of your medications? -  no  Have you stopped taking any of your medications? Is so, why? -  no    Have you seen any other physician or provider since your last visit? No  Have you had any other diagnostic tests since your last visit? No  Have you been seen in the emergency room and/or had an admission to a hospital since we last saw you? No  Have you had your routine dental cleaning in the past 6 months? no    Have you activated your [employer] account? If not, what are your barriers?  Yes     Patient Care Team:  Tonny Fulton MD as PCP - General    Medical History Review  Past Medical, Family, and Social History reviewed and does contribute to the patient presenting condition    Health Maintenance   Topic Date Due    Flu vaccine (1) 09/01/2019    DTaP/Tdap/Td vaccine (7 - Td) 03/14/2029    HPV vaccine  Completed    Varicella Vaccine  Completed    Pneumococcal 0-64 years Vaccine  Completed    HIV screen  Completed

## 2019-07-20 ENCOUNTER — HOSPITAL ENCOUNTER (EMERGENCY)
Age: 24
Discharge: HOME | End: 2019-07-20
Attending: EMERGENCY MEDICINE
Payer: COMMERCIAL

## 2019-07-20 VITALS
WEIGHT: 146 LBS | HEIGHT: 71 IN | DIASTOLIC BLOOD PRESSURE: 78 MMHG | OXYGEN SATURATION: 96 % | HEART RATE: 64 BPM | SYSTOLIC BLOOD PRESSURE: 129 MMHG | BODY MASS INDEX: 20.44 KG/M2 | RESPIRATION RATE: 16 BRPM | TEMPERATURE: 98 F

## 2019-07-20 DIAGNOSIS — L03.011 PARONYCHIA OF FINGER OF RIGHT HAND: Primary | ICD-10-CM

## 2019-07-20 PROCEDURE — 99282 EMERGENCY DEPT VISIT SF MDM: CPT

## 2019-07-20 PROCEDURE — 10060 I&D ABSCESS SIMPLE/SINGLE: CPT

## 2019-07-20 ASSESSMENT — PAIN DESCRIPTION - LOCATION: LOCATION: FINGER (COMMENT WHICH ONE)

## 2019-07-20 ASSESSMENT — ENCOUNTER SYMPTOMS
SHORTNESS OF BREATH: 0
VOMITING: 0
NAUSEA: 0

## 2019-07-20 ASSESSMENT — PAIN DESCRIPTION - ORIENTATION: ORIENTATION: RIGHT

## 2019-07-20 ASSESSMENT — PAIN DESCRIPTION - DESCRIPTORS: DESCRIPTORS: THROBBING;ACHING

## 2019-07-20 ASSESSMENT — PAIN SCALES - GENERAL: PAINLEVEL_OUTOF10: 6

## 2019-07-20 NOTE — ED PROVIDER NOTES
History    Not on file   Social Needs    Financial resource strain: Not on file    Food insecurity:     Worry: Not on file     Inability: Not on file    Transportation needs:     Medical: Not on file     Non-medical: Not on file   Tobacco Use    Smoking status: Never Smoker    Smokeless tobacco: Never Used   Substance and Sexual Activity    Alcohol use: Yes     Alcohol/week: 0.0 standard drinks     Comment: socially    Drug use: No    Sexual activity: Not on file   Lifestyle    Physical activity:     Days per week: Not on file     Minutes per session: Not on file    Stress: Not on file   Relationships    Social connections:     Talks on phone: Not on file     Gets together: Not on file     Attends Jewish service: Not on file     Active member of club or organization: Not on file     Attends meetings of clubs or organizations: Not on file     Relationship status: Not on file    Intimate partner violence:     Fear of current or ex partner: Not on file     Emotionally abused: Not on file     Physically abused: Not on file     Forced sexual activity: Not on file   Other Topics Concern    Not on file   Social History Narrative    Not on file       Family History   Problem Relation Age of Onset    Asthma Mother     High Blood Pressure Mother     Asthma Maternal Grandmother     Heart Disease Maternal Grandmother     High Blood Pressure Maternal Grandmother     Arthritis Neg Hx     Birth Defects Neg Hx     Cancer Neg Hx     Depression Neg Hx     Diabetes Neg Hx     Early Death Neg Hx     Hearing Loss Neg Hx     Kidney Disease Neg Hx     High Cholesterol Neg Hx     Learning Disabilities Neg Hx     Mental Illness Neg Hx     Mental Retardation Neg Hx     Miscarriages / Stillbirths Neg Hx     Stroke Neg Hx     Substance Abuse Neg Hx     Vision Loss Neg Hx         Allergies:  Patient has no known allergies.     Home Medications:  Prior to Admission medications    Medication Sig Start Date amphetamine-dextroamphetamine (ADDERALL XR) 20 MG extended release capsule Take 1 capsule by mouth every morning 10/17/16   Historical Provider, MD   Omega-3 Fatty Acids (FISH OIL) 1000 MG CAPS  9/15/15   Historical Provider, MD       REVIEW OFSYSTEMS    (2-9 systems for level 4, 10 or more for level 5)      Review of Systems   Constitutional: Negative for activity change and fever. Respiratory: Negative for shortness of breath. Cardiovascular: Negative for chest pain. Gastrointestinal: Negative for nausea and vomiting. Neurological: Negative for headaches. Hematological: Does not bruise/bleed easily. PHYSICAL EXAM   (up to 7 for level 4, 8 or more forlevel 5)      INITIAL VITALS:   ED Triage Vitals   BP Temp Temp src Pulse Resp SpO2 Height Weight   -- -- -- -- -- -- -- --       Physical Exam   Constitutional: He appears well-developed and well-nourished. No distress. HENT:   Head: Normocephalic and atraumatic. Right Ear: External ear normal.   Left Ear: External ear normal.   Nose: Nose normal.   Eyes: Conjunctivae are normal.   Cardiovascular: Normal rate, regular rhythm, normal heart sounds and intact distal pulses. No murmur heard. Pulmonary/Chest: Effort normal and breath sounds normal. No respiratory distress. Abdominal: Soft. Skin: Capillary refill takes less than 2 seconds. He is not diaphoretic. Fluctuant area distal phalanx right 3rd digit, no erythema, no drainage. DIFFERENTIAL  DIAGNOSIS     PLAN (LABS / IMAGING / EKG):  No orders of the defined types were placed in this encounter. MEDICATIONS ORDERED:  No orders of the defined types were placed in this encounter. DDX: paronychia, felon, herpetic lowell    Initial MDM/Plan: 22 y.o. male who presents with fluctuance of the distal aspect of the right finger. Area will be incised and drained patient will be instructed to soak area in warm soapy water tid.      DIAGNOSTIC RESULTS / EMERGENCYDEPARTMENT COURSE

## 2019-07-20 NOTE — ED TRIAGE NOTES
Right 3rd digit swelling since Monday. Pt states that he had an abcess drained 2 days ago here in the ED and the finger in half numb and still swollen.

## 2019-07-20 NOTE — ED PROVIDER NOTES
I performed a history and physical examination of the patient and discussed management with the resident. I reviewed the residents note and agree with the documented findings and plan of care. Any areas of disagreement are noted on the chart. I was personally present for the key portions of any procedures. I have documented in the chart those procedures where I was not present during the key portions. I have reviewed the emergency nurses triage note. I agree with the chief complaint, past medical history, past surgical history, allergies, medications, social and family history as documented unless otherwise noted below. Documentation of the HPI, Physical Exam and Medical Decision Making performed by medical students or scribes is based on my personal performance of the HPI, PE and MDM. For Phys Assistant/ Nurse Practitioner cases/documentation I have personally evaluated this patient and have completed at least one if not all key elements of the E/M (history, physical exam, and MDM). I find the patient's history and physical exam are consistent with the NP/PA documentation. I agree with the care provided, treatment rendered, disposition and followup plan. Additional findings are as noted. Gustavo Gale. Mickey Khanna MD  Attending Emergency  Physician    PARONYCHIA RIGHT LONG FINGER FOR SEV DAYS. WAS SEEN IN [place] [address] TWO DAYS AGO AND PARONYCHIA WAS ASPIRATED, NOT I/D'ED. STARTED ON AUGMENTIN(?). HAD BEEN SOAKING DIGIT, BUT NO DRAINAGE OR DISCHARGE.  LHD. AWAKE, ALERT, COOP, RESPONSIVE. RIGHT HAND/LONG FINGER-PARONYCHIA ON RADIAL ASPECT OF DIGIT. INCISED WITH 18GA NEEDLE, WITH RETURN IF PURULENCE AND BLOOD. DRESSED. IMP-PARONYCHIA RIGHT LONG FINGER. PLAN-DISCHARGE, WARM SOAPY WATER SOAKS 10-15 MIN, 3-4X DAILY. KEEP DIGIT DRESSED BETWEEN SOAKS. CONTINUE ATB'S. RETURN IF SX WORSEN OR PROGRESS. STOP BITING NAILS.           Elisa Pratt MD  07/20/19 [id number]

## 2019-08-19 ENCOUNTER — HOSPITAL ENCOUNTER (EMERGENCY)
Age: 24
Discharge: HOME | End: 2019-08-19
Attending: EMERGENCY MEDICINE
Payer: COMMERCIAL

## 2019-08-19 ENCOUNTER — OFFICE VISIT (OUTPATIENT)
Dept: PRIMARY CARE CLINIC | Age: 24
End: 2019-08-19
Payer: COMMERCIAL

## 2019-08-19 ENCOUNTER — APPOINTMENT (OUTPATIENT)
Dept: ULTRASOUND IMAGING | Age: 24
End: 2019-08-19
Payer: COMMERCIAL

## 2019-08-19 ENCOUNTER — HOSPITAL ENCOUNTER (OUTPATIENT)
Age: 24
Discharge: HOME | End: 2019-08-19
Payer: COMMERCIAL

## 2019-08-19 ENCOUNTER — HOSPITAL ENCOUNTER (OUTPATIENT)
Age: 24
Discharge: HOME | End: 2019-08-21
Payer: COMMERCIAL

## 2019-08-19 ENCOUNTER — HOSPITAL ENCOUNTER (OUTPATIENT)
Dept: GENERAL RADIOLOGY | Age: 24
Discharge: HOME | End: 2019-08-21
Payer: COMMERCIAL

## 2019-08-19 VITALS
HEART RATE: 62 BPM | RESPIRATION RATE: 16 BRPM | BODY MASS INDEX: 19.04 KG/M2 | WEIGHT: 136 LBS | SYSTOLIC BLOOD PRESSURE: 119 MMHG | TEMPERATURE: 98.6 F | OXYGEN SATURATION: 100 % | HEIGHT: 71 IN | DIASTOLIC BLOOD PRESSURE: 73 MMHG

## 2019-08-19 VITALS
WEIGHT: 137.8 LBS | SYSTOLIC BLOOD PRESSURE: 114 MMHG | TEMPERATURE: 98.1 F | OXYGEN SATURATION: 99 % | BODY MASS INDEX: 19.22 KG/M2 | DIASTOLIC BLOOD PRESSURE: 68 MMHG | HEART RATE: 63 BPM

## 2019-08-19 DIAGNOSIS — Z00.00 WELL ADULT EXAM: ICD-10-CM

## 2019-08-19 DIAGNOSIS — Z53.20 HIV SCREENING DECLINED: ICD-10-CM

## 2019-08-19 DIAGNOSIS — R31.9 HEMATURIA, UNSPECIFIED TYPE: Primary | ICD-10-CM

## 2019-08-19 DIAGNOSIS — G44.209 ACUTE NON INTRACTABLE TENSION-TYPE HEADACHE: Primary | ICD-10-CM

## 2019-08-19 LAB
-: ABNORMAL
AMORPHOUS: ABNORMAL
BACTERIA: ABNORMAL
BILIRUBIN URINE: NEGATIVE
CASTS UA: ABNORMAL /LPF (ref 0–8)
COLOR: YELLOW
CRYSTALS, UA: ABNORMAL /HPF
EPITHELIAL CELLS UA: ABNORMAL /HPF (ref 0–5)
GLUCOSE URINE: NEGATIVE
HIV AG/AB: NONREACTIVE
HIV AG/AB: NONREACTIVE
KETONES, URINE: NEGATIVE
LEUKOCYTE ESTERASE, URINE: NEGATIVE
MUCUS: ABNORMAL
NITRITE, URINE: NEGATIVE
OTHER OBSERVATIONS UA: ABNORMAL
PH UA: 5.5 (ref 5–8)
PROTEIN UA: NEGATIVE
RBC UA: ABNORMAL /HPF (ref 0–4)
RENAL EPITHELIAL, UA: ABNORMAL /HPF
SPECIFIC GRAVITY UA: 1.02 (ref 1–1.03)
T. PALLIDUM, IGG: NONREACTIVE
TRICHOMONAS: ABNORMAL
TURBIDITY: CLEAR
URINE HGB: ABNORMAL
UROBILINOGEN, URINE: NORMAL
WBC UA: ABNORMAL /HPF (ref 0–5)
YEAST: ABNORMAL

## 2019-08-19 PROCEDURE — 87389 HIV-1 AG W/HIV-1&-2 AB AG IA: CPT

## 2019-08-19 PROCEDURE — 87491 CHLMYD TRACH DNA AMP PROBE: CPT

## 2019-08-19 PROCEDURE — 87591 N.GONORRHOEAE DNA AMP PROB: CPT

## 2019-08-19 PROCEDURE — 86780 TREPONEMA PALLIDUM: CPT

## 2019-08-19 PROCEDURE — 87086 URINE CULTURE/COLONY COUNT: CPT

## 2019-08-19 PROCEDURE — 96372 THER/PROPH/DIAG INJ SC/IM: CPT

## 2019-08-19 PROCEDURE — 6370000000 HC RX 637 (ALT 250 FOR IP): Performed by: PHYSICIAN ASSISTANT

## 2019-08-19 PROCEDURE — 4004F PT TOBACCO SCREEN RCVD TLK: CPT | Performed by: INTERNAL MEDICINE

## 2019-08-19 PROCEDURE — 76770 US EXAM ABDO BACK WALL COMP: CPT

## 2019-08-19 PROCEDURE — 36415 COLL VENOUS BLD VENIPUNCTURE: CPT

## 2019-08-19 PROCEDURE — G8420 CALC BMI NORM PARAMETERS: HCPCS | Performed by: INTERNAL MEDICINE

## 2019-08-19 PROCEDURE — G8427 DOCREV CUR MEDS BY ELIG CLIN: HCPCS | Performed by: INTERNAL MEDICINE

## 2019-08-19 PROCEDURE — 71046 X-RAY EXAM CHEST 2 VIEWS: CPT

## 2019-08-19 PROCEDURE — 99214 OFFICE O/P EST MOD 30 MIN: CPT | Performed by: INTERNAL MEDICINE

## 2019-08-19 PROCEDURE — 81001 URINALYSIS AUTO W/SCOPE: CPT

## 2019-08-19 PROCEDURE — 6360000002 HC RX W HCPCS: Performed by: PHYSICIAN ASSISTANT

## 2019-08-19 PROCEDURE — 99284 EMERGENCY DEPT VISIT MOD MDM: CPT

## 2019-08-19 RX ORDER — CEFTRIAXONE SODIUM 250 MG/1
250 INJECTION, POWDER, FOR SOLUTION INTRAMUSCULAR; INTRAVENOUS ONCE
Status: COMPLETED | OUTPATIENT
Start: 2019-08-19 | End: 2019-08-19

## 2019-08-19 RX ORDER — CEPHALEXIN 500 MG/1
500 CAPSULE ORAL 4 TIMES DAILY
Qty: 28 CAPSULE | Refills: 0 | Status: SHIPPED | OUTPATIENT
Start: 2019-08-19 | End: 2019-08-26

## 2019-08-19 RX ORDER — NAPROXEN 500 MG/1
500 TABLET ORAL 2 TIMES DAILY WITH MEALS
Qty: 30 TABLET | Refills: 0 | Status: SHIPPED | OUTPATIENT
Start: 2019-08-19 | End: 2020-03-03

## 2019-08-19 RX ORDER — AZITHROMYCIN 250 MG/1
1000 TABLET, FILM COATED ORAL ONCE
Status: COMPLETED | OUTPATIENT
Start: 2019-08-19 | End: 2019-08-19

## 2019-08-19 RX ADMIN — CEFTRIAXONE SODIUM 250 MG: 250 INJECTION, POWDER, FOR SOLUTION INTRAMUSCULAR; INTRAVENOUS at 18:45

## 2019-08-19 RX ADMIN — AZITHROMYCIN 1000 MG: 250 TABLET, FILM COATED ORAL at 18:45

## 2019-08-19 ASSESSMENT — ENCOUNTER SYMPTOMS
SORE THROAT: 0
BLURRED VISION: 0
SHORTNESS OF BREATH: 0
VOMITING: 0
COLOR CHANGE: 0
NAUSEA: 0
ABDOMINAL PAIN: 0
BACK PAIN: 0
DIARRHEA: 0
COUGH: 0

## 2019-08-19 ASSESSMENT — PAIN DESCRIPTION - LOCATION: LOCATION: PENIS

## 2019-08-19 ASSESSMENT — PAIN SCALES - GENERAL: PAINLEVEL_OUTOF10: 6

## 2019-08-19 ASSESSMENT — PAIN DESCRIPTION - DESCRIPTORS: DESCRIPTORS: SHOOTING

## 2019-08-19 NOTE — ED NOTES
Report given to [facility], RN     Sharath Landis RN  08/19/19 0344

## 2019-08-19 NOTE — ED PROVIDER NOTES
Abdominal: Soft. Bowel sounds are normal. He exhibits no distension and no mass. There is no tenderness. There is no rebound and no guarding. No hernia. No peritoneal signs. Genitourinary: Testes normal and penis normal. Cremasteric reflex is present. Right testis shows no mass, no swelling and no tenderness. Right testis is descended. Cremasteric reflex is not absent on the right side. Left testis shows no mass, no swelling and no tenderness. Left testis is descended. Cremasteric reflex is not absent on the left side. No phimosis, paraphimosis, hypospadias, penile erythema or penile tenderness. No discharge found. Genitourinary Comments: Upon genital examination there is no lesions or chancre or chancroid noted to the penis. No testicular pain. Musculoskeletal: Normal range of motion. Neurological: He is alert and oriented to person, place, and time. No cranial nerve deficit. Skin: Skin is warm. Capillary refill takes less than 2 seconds. He is not diaphoretic. Psychiatric: He has a normal mood and affect. His behavior is normal. Judgment and thought content normal.   Nursing note and vitals reviewed. DIAGNOSTIC RESULTS       RADIOLOGY:   Non-plain film images such asCT, Ultrasound and MRI are read by the radiologist. Plain radiographic images are visualized and preliminarily interpreted by [facility], PA-C with the below findings:    See below. Interpretation per the Radiologist below, if available at the time of this note:    US RENAL COMPLETE   Final Result   1. Morphologically normal kidneys with no obstructive uropathy   2. Suspected urinary bladder wall thickening.   Correlate with any clinical   findings of cystitis               LABS:  Labs Reviewed   URINALYSIS WITH MICROSCOPIC - Abnormal; Notable for the following components:       Result Value    Urine Hgb LARGE (*)     All other components within normal limits   C.TRACHOMATIS N.GONORRHOEAE DNA, URINE   URINE CULTURE   T.

## 2019-08-20 LAB
C. TRACHOMATIS DNA ,URINE: NEGATIVE
CULTURE: NO GROWTH
Lab: NORMAL
N. GONORRHOEAE DNA, URINE: NEGATIVE
SPECIMEN DESCRIPTION: NORMAL
SPECIMEN DESCRIPTION: NORMAL

## 2019-08-21 ENCOUNTER — OFFICE VISIT (OUTPATIENT)
Dept: PRIMARY CARE CLINIC | Age: 24
End: 2019-08-21
Payer: COMMERCIAL

## 2019-08-21 ENCOUNTER — HOSPITAL ENCOUNTER (OUTPATIENT)
Age: 24
Setting detail: SPECIMEN
Discharge: HOME | End: 2019-08-21
Payer: COMMERCIAL

## 2019-08-21 VITALS
BODY MASS INDEX: 19.67 KG/M2 | TEMPERATURE: 98.2 F | OXYGEN SATURATION: 98 % | WEIGHT: 141 LBS | DIASTOLIC BLOOD PRESSURE: 69 MMHG | SYSTOLIC BLOOD PRESSURE: 116 MMHG | HEART RATE: 62 BPM

## 2019-08-21 DIAGNOSIS — R10.9 ABDOMINAL PAIN, UNSPECIFIED ABDOMINAL LOCATION: Primary | ICD-10-CM

## 2019-08-21 LAB
BILIRUBIN, POC: NEGATIVE
BLOOD URINE, POC: NEGATIVE
CLARITY, POC: NORMAL
COLOR, POC: YELLOW
GLUCOSE URINE, POC: NEGATIVE
KETONES, POC: NEGATIVE
LEUKOCYTE EST, POC: NEGATIVE
NITRITE, POC: NORMAL
PH, POC: 6
PROTEIN, POC: NORMAL
SPECIFIC GRAVITY, POC: 1.02
UROBILINOGEN, POC: 0.2

## 2019-08-21 PROCEDURE — 99213 OFFICE O/P EST LOW 20 MIN: CPT | Performed by: NURSE PRACTITIONER

## 2019-08-21 PROCEDURE — G8420 CALC BMI NORM PARAMETERS: HCPCS | Performed by: NURSE PRACTITIONER

## 2019-08-21 PROCEDURE — 81003 URINALYSIS AUTO W/O SCOPE: CPT | Performed by: NURSE PRACTITIONER

## 2019-08-21 PROCEDURE — G8427 DOCREV CUR MEDS BY ELIG CLIN: HCPCS | Performed by: NURSE PRACTITIONER

## 2019-08-21 PROCEDURE — 4004F PT TOBACCO SCREEN RCVD TLK: CPT | Performed by: NURSE PRACTITIONER

## 2019-08-21 ASSESSMENT — ENCOUNTER SYMPTOMS
VOMITING: 0
EYE PAIN: 0
DIARRHEA: 0
BACK PAIN: 0
SHORTNESS OF BREATH: 0
ABDOMINAL PAIN: 1
SINUS PAIN: 0
NAUSEA: 0
COUGH: 0
SORE THROAT: 0

## 2019-08-21 NOTE — PATIENT INSTRUCTIONS
These can cause stomach upset. Talk to your doctor if you take daily aspirin for another health problem. When should you call for help? Call 911 anytime you think you may need emergency care. For example, call if:    · You passed out (lost consciousness).     · You pass maroon or very bloody stools.     · You vomit blood or what looks like coffee grounds.     · You have new, severe belly pain.    Call your doctor now or seek immediate medical care if:    · Your pain gets worse, especially if it becomes focused in one area of your belly.     · You have a new or higher fever.     · Your stools are black and look like tar, or they have streaks of blood.     · You have unexpected vaginal bleeding.     · You have symptoms of a urinary tract infection. These may include:  ? Pain when you urinate. ? Urinating more often than usual.  ? Blood in your urine.     · You are dizzy or lightheaded, or you feel like you may faint.    Watch closely for changes in your health, and be sure to contact your doctor if:    · You are not getting better after 1 day (24 hours). Where can you learn more? Go to https://[email or web address]. org and sign in to your [employer] account. Enter V668 in the [employer] box to learn more about \"Abdominal Pain: Care Instructions. \"     If you do not have an account, please click on the \"Sign Up Now\" link. Current as of: September 23, 2018  Content Version: 12.1  © 4976-0126 Healthwise, CONEXANCE MD. Care instructions adapted under license by [facility] ([facility]). If you have questions about a medical condition or this instruction, always ask your healthcare professional. [place] [address] any warranty or liability for your use of this information.

## 2019-08-21 NOTE — PROGRESS NOTES
cloNIDine (CATAPRES) 0.2 MG tablet Take 0.2 mg by mouth nightly      triamcinolone (KENALOG) 0.1 % cream Apply topically 2 times daily. (Patient not taking: Reported on 8/19/2019) 30 g 0    loratadine (CLARITIN) 10 MG tablet Take 1 tablet by mouth daily (Patient not taking: Reported on 8/21/2019) 30 tablet 1    Misc. Devices MISC Use as directed by Physician. Apply CAM boot to the left foot/leg. (Patient not taking: Reported on 8/21/2019) 1 Device 0    naproxen (NAPROSYN) 500 MG tablet Take 1 tablet by mouth 2 times daily (with meals) (Patient not taking: Reported on 8/21/2019) 60 tablet 0    diphenhydrAMINE (BENADRYL) 25 MG tablet Take 1 tablet by mouth every 6 hours as needed for Itching or Allergies (Patient not taking: Reported on 8/19/2019) 30 tablet 0    ondansetron (ZOFRAN) 4 MG tablet Take 1 tablet by mouth every 8 hours as needed for Nausea (Patient not taking: Reported on 8/19/2019) 10 tablet 0    pantoprazole (PROTONIX) 20 MG tablet Take 1 tablet by mouth daily for 14 days 14 tablet 0    Lactobacillus (PROBIOTIC ACIDOPHILUS) TABS Take 1 tablet by mouth daily (Patient not taking: Reported on 8/19/2019) 30 tablet 0    diphenhydrAMINE-zinc acetate (BENADRYL EXTRA STRENGTH) 2-0.1 % cream Apply topically 2 times daily as needed for Itching (Patient not taking: Reported on 8/19/2019) 1 Tube 0    benzoyl peroxide 5 % gel Apply topically daily. (Patient not taking: Reported on 8/19/2019) 60 g 0    amphetamine-dextroamphetamine (ADDERALL XR) 20 MG extended release capsule Take 1 capsule by mouth every morning      Omega-3 Fatty Acids (FISH OIL) 1000 MG CAPS        No current facility-administered medications for this visit. No Known Allergies    Subjective:      Review of Systems   Constitutional: Negative for chills and fatigue. HENT: Negative for congestion, ear pain, sinus pain and sore throat. Eyes: Negative for pain and visual disturbance.    Respiratory: Negative for cough and shortness 8/20/2019 11:41 PM - Nick, pn Incoming Lab Results From [facility]     Specimen Information: Urine, clean catch        Component Collected Lab   Specimen Description 08/19/2019 11:50 PM [facility]   . CLEAN CATCH URINE    Special Requests 08/19/2019 11:50 PM [facility]   NOT REPORTED    Culture 08/19/2019 11:50 PM [facility]   NO GROWTH      8/19/2019  5:28 PM - Nick, pn Incoming Lab Results From [facility]     Component Value Ref Range & Units Status Collected Lab   HIV Ag/Ab NONREACTIVE  NONREACTIVE Final 08/19/2019  3:35 PM [facility]     8/19/2019  6:55 PM - Nick, pn Incoming Lab Results From [facility]     Component Value Ref Range & Units Status Collected Lab   T. pallidum, IgG NONREACTIVE  NONREACTIVE Final 08/19/2019  6:08 PM [facility]             HIV Ag/Ab NONREACTIVE  NONREACTIVE Final 08/19/2019  3:35 PM [facility]       Results for orders placed or performed in visit on 08/21/19   POCT Urinalysis No Micro (Auto)   Result Value Ref Range    Color, UA yellow     Clarity, UA cloudy     Glucose, UA POC negative     Bilirubin, UA negative     Ketones, UA negative     Spec Grav, UA 1.025     Blood, UA POC negative     pH, UA 6.0     Protein, UA POC posiitve     Urobilinogen, UA 0.2     Leukocytes, UA negative     Nitrite, UA negaitve        Reviewed from patient's recent evaluation along with medications prescribed. Discussed to continue Keflex for full duration, check urine with an office which had improved, will send for additional urine culture    Discussed to follow up here or at an ER if sx worsen or persist.  Pt agreeable to plan. Patient given educational materials - see patient instructions. Discussed use, benefit, and side effects of prescribed medications. All patientquestions answered. Pt voiced understanding. This note was transcribed using dictation with Dragon services.  Efforts were

## 2019-08-23 LAB
CULTURE: NO GROWTH
Lab: NORMAL
PHYSICIAN ID COMMENT: NORMAL
PHYSICIAN: NORMAL
SPECIMEN DESCRIPTION: NORMAL
ZZ NTE CLEAN UP: ORDERED TEST: NORMAL
ZZ NTE WITH NAME CLEAN UP: SPECIMEN SOURCE: NORMAL

## 2019-10-29 ENCOUNTER — HOSPITAL ENCOUNTER (OUTPATIENT)
Age: 24
Setting detail: SPECIMEN
Discharge: HOME | End: 2019-10-29
Payer: COMMERCIAL

## 2019-10-29 ENCOUNTER — OFFICE VISIT (OUTPATIENT)
Dept: PRIMARY CARE CLINIC | Age: 24
End: 2019-10-29
Payer: COMMERCIAL

## 2019-10-29 ENCOUNTER — HOSPITAL ENCOUNTER (OUTPATIENT)
Age: 24
Discharge: HOME | End: 2019-10-29
Payer: COMMERCIAL

## 2019-10-29 VITALS — HEART RATE: 69 BPM | OXYGEN SATURATION: 98 % | DIASTOLIC BLOOD PRESSURE: 71 MMHG | SYSTOLIC BLOOD PRESSURE: 115 MMHG

## 2019-10-29 DIAGNOSIS — Z20.2 POTENTIAL EXPOSURE TO STD: ICD-10-CM

## 2019-10-29 DIAGNOSIS — L30.9 DERMATITIS OF FACE: ICD-10-CM

## 2019-10-29 DIAGNOSIS — G44.209 TENSION HEADACHE: Primary | ICD-10-CM

## 2019-10-29 LAB — HEPATITIS C ANTIBODY: NONREACTIVE

## 2019-10-29 PROCEDURE — 4004F PT TOBACCO SCREEN RCVD TLK: CPT | Performed by: INTERNAL MEDICINE

## 2019-10-29 PROCEDURE — 99213 OFFICE O/P EST LOW 20 MIN: CPT | Performed by: INTERNAL MEDICINE

## 2019-10-29 PROCEDURE — 87389 HIV-1 AG W/HIV-1&-2 AB AG IA: CPT

## 2019-10-29 PROCEDURE — G8484 FLU IMMUNIZE NO ADMIN: HCPCS | Performed by: INTERNAL MEDICINE

## 2019-10-29 PROCEDURE — 86780 TREPONEMA PALLIDUM: CPT

## 2019-10-29 PROCEDURE — G8420 CALC BMI NORM PARAMETERS: HCPCS | Performed by: INTERNAL MEDICINE

## 2019-10-29 PROCEDURE — G8427 DOCREV CUR MEDS BY ELIG CLIN: HCPCS | Performed by: INTERNAL MEDICINE

## 2019-10-29 PROCEDURE — 86803 HEPATITIS C AB TEST: CPT

## 2019-10-29 PROCEDURE — 36415 COLL VENOUS BLD VENIPUNCTURE: CPT

## 2019-10-29 RX ORDER — KETOROLAC TROMETHAMINE 30 MG/ML
60 INJECTION, SOLUTION INTRAMUSCULAR; INTRAVENOUS ONCE
Status: COMPLETED | OUTPATIENT
Start: 2019-10-29 | End: 2019-10-29

## 2019-10-29 RX ORDER — FLUTICASONE PROPIONATE 50 MCG
1 SPRAY, SUSPENSION (ML) NASAL DAILY
Qty: 2 BOTTLE | Refills: 1 | Status: SHIPPED | OUTPATIENT
Start: 2019-10-29 | End: 2020-03-03 | Stop reason: SDUPTHER

## 2019-10-29 RX ADMIN — KETOROLAC TROMETHAMINE 60 MG: 30 INJECTION, SOLUTION INTRAMUSCULAR; INTRAVENOUS at 15:54

## 2019-10-30 LAB
C. TRACHOMATIS DNA ,URINE: NEGATIVE
HIV AG/AB: NONREACTIVE
N. GONORRHOEAE DNA, URINE: NEGATIVE
SPECIMEN DESCRIPTION: NORMAL
T. PALLIDUM, IGG: NONREACTIVE

## 2019-12-18 ENCOUNTER — OFFICE VISIT (OUTPATIENT)
Dept: PRIMARY CARE CLINIC | Age: 24
End: 2019-12-18
Payer: COMMERCIAL

## 2019-12-18 VITALS
SYSTOLIC BLOOD PRESSURE: 113 MMHG | TEMPERATURE: 97.4 F | HEART RATE: 62 BPM | DIASTOLIC BLOOD PRESSURE: 74 MMHG | WEIGHT: 142 LBS | BODY MASS INDEX: 19.8 KG/M2

## 2019-12-18 DIAGNOSIS — H10.32 ACUTE CONJUNCTIVITIS OF LEFT EYE, UNSPECIFIED ACUTE CONJUNCTIVITIS TYPE: Primary | ICD-10-CM

## 2019-12-18 PROCEDURE — G8427 DOCREV CUR MEDS BY ELIG CLIN: HCPCS | Performed by: NURSE PRACTITIONER

## 2019-12-18 PROCEDURE — G8484 FLU IMMUNIZE NO ADMIN: HCPCS | Performed by: NURSE PRACTITIONER

## 2019-12-18 PROCEDURE — 99213 OFFICE O/P EST LOW 20 MIN: CPT | Performed by: NURSE PRACTITIONER

## 2019-12-18 PROCEDURE — G8420 CALC BMI NORM PARAMETERS: HCPCS | Performed by: NURSE PRACTITIONER

## 2019-12-18 PROCEDURE — 4004F PT TOBACCO SCREEN RCVD TLK: CPT | Performed by: NURSE PRACTITIONER

## 2019-12-18 RX ORDER — POLYMYXIN B SULFATE AND TRIMETHOPRIM 1; 10000 MG/ML; [USP'U]/ML
1 SOLUTION OPHTHALMIC 4 TIMES DAILY
Qty: 10 ML | Refills: 0 | Status: SHIPPED | OUTPATIENT
Start: 2019-12-18 | End: 2019-12-25

## 2019-12-18 ASSESSMENT — ENCOUNTER SYMPTOMS
EYE PAIN: 0
EYE REDNESS: 1
SORE THROAT: 0
DIARRHEA: 0
BACK PAIN: 0
SHORTNESS OF BREATH: 0
VOMITING: 0
ABDOMINAL PAIN: 0
COUGH: 0
PHOTOPHOBIA: 1
EYE DISCHARGE: 1
SINUS PAIN: 0
NAUSEA: 0
EYE ITCHING: 1

## 2020-03-03 ENCOUNTER — HOSPITAL ENCOUNTER (OUTPATIENT)
Age: 25
Setting detail: SPECIMEN
Discharge: HOME | End: 2020-03-03
Payer: COMMERCIAL

## 2020-03-03 ENCOUNTER — OFFICE VISIT (OUTPATIENT)
Dept: DERMATOLOGY | Age: 25
End: 2020-03-03
Payer: COMMERCIAL

## 2020-03-03 VITALS
HEART RATE: 66 BPM | BODY MASS INDEX: 22.36 KG/M2 | WEIGHT: 151 LBS | DIASTOLIC BLOOD PRESSURE: 65 MMHG | OXYGEN SATURATION: 97 % | HEIGHT: 69 IN | SYSTOLIC BLOOD PRESSURE: 125 MMHG

## 2020-03-03 PROCEDURE — 11104 PUNCH BX SKIN SINGLE LESION: CPT | Performed by: DERMATOLOGY

## 2020-03-03 NOTE — PATIENT INSTRUCTIONS

## 2020-03-03 NOTE — PROGRESS NOTES
Allergies    SOCIAL HISTORY:  Social History     Tobacco Use    Smoking status: Former Smoker     Types: Cigarettes     Last attempt to quit: 1/3/2020     Years since quittin.1    Smokeless tobacco: Never Used   Substance Use Topics    Alcohol use: Yes     Alcohol/week: 0.0 standard drinks     Comment: socially       REVIEW OF SYSTEMS:  Review of Systems  Skin: Denies any new changing, growing orbleeding lesions or rashes except as described in the HPI   Constitutional: Denies fevers, chills, and malaise. PHYSICAL EXAM:   /65 (Site: Right Upper Arm, Position: Sitting, Cuff Size: Medium Adult)   Pulse 66   Ht 5' 8.5\" (1.74 m)   Wt 151 lb (68.5 kg)   SpO2 97%   BMI 22.63 kg/m²     General Exam:  General Appearance: No acute distress, Well nourished     Neuro: Alert and oriented to person, place and time  Psych: Normal affect   Lymph Node: Not performed    Cutaneous Exam: Performed as documented in clinic note below. Sun-exposed skin, which includes the head/face, neck, both arms, digits and/or nails was examined. Pertinent Physical Exam Findings:  Physical Exam  Discoid atrophic scarred red-brown plaques of right face (2) and left temple (1)  Conchal bowls and scalp clear2    Photo surveillance performed: Yes    Medical Necessity of Exam Performed:   Distribution of patient concerns    Additional Diagnostic Testing performed during exam: Not performed ,  Not performed    ASSESSMENT:   Diagnosis Orders   1. Other specified dermatitis  Surgical Pathology    TX PUNCH BIOPSY SKIN SINGLE LESION    lidocaine-EPINEPHrine 1 percent-1:176776 injection 0.5 mL       Plan of Action is as Follows:  Assessment   1. Favor discoid lupus erythematosus, much less likely infectious  Punch Biopsy (left temple): The procedure and its risks were explained including but not limited to pain, bleeding, infection, permanent scar, permanent pigment alteration and need for an additional procedure.  Consent to proceed with the procedure was obtained from the patient or the parent. After cleaning with alcohol the rash was anesthetized with 1% lidocaine with epinephrine and a 4 mm punch was performed. Hemostasis was achieved with suture closure of the defect with 4-0 Ethilon and Vaseline and a bandage were applied. ]  - Surgical Pathology; Future  - IA PUNCH BIOPSY SKIN SINGLE LESION  - lidocaine-EPINEPHrine 1 percent-1:399178 injection 0.5 mL    RTC for SR and biopsy results            Patient Instructions   BIOPSY WOUND CARE    A biopsy is where a small piece of skin tissue is removed and examined by a pathologist.  When a biopsy is done, there is a small wound site that requires proper care to prevent infection and scarring. Some biopsies require sutures and their removal.    How to Care for Biopsy Wound    A.  Leave band-aid or dressing on for 24 hours. B. Wash two times a day with soap and water. C.  Let the wound air dry, then apply Vaseline ointment and cover with a Band-Aid       unless otherwise instructed by your provider. D. If there is slight discomfort, you may give acetaminophen or ibuprofen. When To Call the Doctor    Call the Dermatology Clinic or your doctor if any of the following occur:    A. Redness and swelling  B. Tenderness and warm to touch  C.  Drainage from wound  D. Fever    Biopsy Results    Biopsy results are usually available in 1-2 weeks. We provide biopsy results in letters for begin results or we will call for any concerning results. If you have not heard from our staff please call the office within 2 weeks. Please call our office with any concerns at [phone]. Follow-up: No follow-ups on file.       This note was created with the assistance of a speech-recognition program.  Although the intention is to generate a document that actually reflects the content of the visit, no guarantees can be provided that every mistake has been identified and corrected byediting.     Electronically signed by Maryanne Lazo MD on 3/3/20 at 3:21 PM

## 2020-03-04 RX ORDER — LIDOCAINE HYDROCHLORIDE AND EPINEPHRINE 10; 10 MG/ML; UG/ML
0.5 INJECTION, SOLUTION INFILTRATION; PERINEURAL ONCE
Status: COMPLETED | OUTPATIENT
Start: 2020-03-04 | End: 2020-03-04

## 2020-03-04 RX ADMIN — LIDOCAINE HYDROCHLORIDE AND EPINEPHRINE 0.5 ML: 10; 10 INJECTION, SOLUTION INFILTRATION; PERINEURAL at 08:05

## 2020-03-09 LAB — DERMATOLOGY PATHOLOGY REPORT: NORMAL

## 2020-03-11 ENCOUNTER — OFFICE VISIT (OUTPATIENT)
Dept: DERMATOLOGY | Age: 25
End: 2020-03-11
Payer: COMMERCIAL

## 2020-03-11 VITALS
WEIGHT: 151 LBS | DIASTOLIC BLOOD PRESSURE: 68 MMHG | OXYGEN SATURATION: 99 % | HEIGHT: 69 IN | HEART RATE: 65 BPM | SYSTOLIC BLOOD PRESSURE: 132 MMHG | BODY MASS INDEX: 22.36 KG/M2

## 2020-03-11 PROCEDURE — G8420 CALC BMI NORM PARAMETERS: HCPCS | Performed by: DERMATOLOGY

## 2020-03-11 PROCEDURE — G8484 FLU IMMUNIZE NO ADMIN: HCPCS | Performed by: DERMATOLOGY

## 2020-03-11 PROCEDURE — 1036F TOBACCO NON-USER: CPT | Performed by: DERMATOLOGY

## 2020-03-11 PROCEDURE — G8427 DOCREV CUR MEDS BY ELIG CLIN: HCPCS | Performed by: DERMATOLOGY

## 2020-03-11 PROCEDURE — 99213 OFFICE O/P EST LOW 20 MIN: CPT | Performed by: DERMATOLOGY

## 2020-03-11 RX ORDER — MOMETASONE FUROATE 1 MG/G
OINTMENT TOPICAL
Qty: 15 G | Refills: 2 | Status: SHIPPED | OUTPATIENT
Start: 2020-03-11 | End: 2020-05-19 | Stop reason: SDUPTHER

## 2020-03-11 NOTE — PATIENT INSTRUCTIONS
Discoid lupus (discoid lupus erythematosus) is a chronic autoimmune disease affecting the skin. It gets its name from the coin-shaped lesions it produces. This condition causes a severe rash that tends to get worse when exposed to sunlight.    -Complete Lab work  -Apply Mometasone ointment to the active spots twice a day, Do not apply to the eyelids  -Referral the Rheumatologist  -Start oral Plaquenil   -Referral to eye doctor. See this Dr before you start Plaquenil.   -Follow up in 2 months. Sun Protection     There are two types of sun rays that are harmful to the skin. UVA rays cause skin aging and skin cancer, such as melanoma. UVB rays cause sunburns, cataracts, and also contribute to skin cancer. The American-Academy of Dermatology recommends that children and adults wear a broad spectrum, waterproof sunscreen with a Sun Protection Factor (SPF) of 30 or higher. It is important to check the ingredient label to be sure the sunscreen will protect the skin from both UVA and UVB sunrays. Your sunscreen should contain at least one of the following ingredients: titanium dioxide, zinc oxide, or avobenzone. Sunscreen will not be effective unless it is applied to all exposed skin. Sunscreens work best if they are applied 30 minutes before sun exposure. They should be reapplied every 2 hours and after any water exposure. Sunscreen is not perfect. It is important to use other methods to protect the skin from sun exposure also. Wear hats, sunglasses and other sun protective clothing when outdoors.   Stay in the shade during the peak hours of sun exposure between 10 AM and 4 PM.

## 2020-03-11 NOTE — PROGRESS NOTES
Dermatology Patient Note  [facility] PHYSICIANS  [facility] HE[facility] DERMATOLOG[facility]  [address] [place]  [address]  Dept: [phone]  Dept Fax: [phone]      VISITDATE: 3/11/2020   REFERRING PROVIDER: No ref. provider found      Pavan Saravia is a 22 y.o. male  who presents today in the office for:    Follow-up (SR & bx results; no new concerns)      HISTORY OF PRESENT ILLNESS:  Patient presents for f/u facial rash, here for SR and biopsy result  Interval history: rash unchanged. 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 CURRENT MEDICATIONS:   Current Outpatient Medications   Medication Sig Dispense Refill    mometasone (ELOCON) 0.1 % ointment Apply topically daily to lupus rash 15 g 2    loratadine (CLARITIN) 10 MG tablet Take 1 tablet by mouth daily 30 tablet 1    triamcinolone (NASACORT) 55 MCG/ACT nasal inhaler 2 sprays by Nasal route daily 1 Inhaler 1    ibuprofen (ADVIL;MOTRIN) 800 MG tablet Take 1 tablet by mouth every 6 hours as needed for Pain 20 tablet 0    diphenhydrAMINE (BENADRYL) 25 MG tablet Take 1 tablet by mouth every 6 hours as needed for Itching or Allergies 30 tablet 0    ondansetron (ZOFRAN) 4 MG tablet Take 1 tablet by mouth every 8 hours as needed for Nausea 10 tablet 0    Lactobacillus (PROBIOTIC ACIDOPHILUS) TABS Take 1 tablet by mouth daily 30 tablet 0    fluticasone (FLONASE) 50 MCG/ACT nasal spray 1 spray by Nasal route daily 1 Bottle 3    cloNIDine (CATAPRES) 0.2 MG tablet Take 0.2 mg by mouth nightly      pantoprazole (PROTONIX) 20 MG tablet Take 1 tablet by mouth daily for 14 days 14 tablet 0     No current facility-administered medications for this visit.         ALLERGIES:   No Known Allergies    SOCIAL HISTORY:  Social History     Tobacco Use    Smoking status: Former Smoker     Types: consider seeing rheum. Will refer once labs completed. -Follow up in 2 months. - mometasone (ELOCON) 0.1 % ointment; Apply topically daily to lupus rash  Dispense: 15 g; Refill: 2  - KELVIN Screen with Reflex; Future  - CBC Auto Differential; Future  - Comprehensive Metabolic Panel; Future  - Urinalysis with Microscopic; Future    RTC 2 months            Patient Instructions   Discoid lupus (discoid lupus erythematosus) is a chronic autoimmune disease affecting the skin. It gets its name from the coin-shaped lesions it produces. This condition causes a severe rash that tends to get worse when exposed to sunlight.    -Complete Lab work  -Apply Mometasone ointment to the active spots twice a day, Do not apply to the eyelids  -Referral the Rheumatologist  -Start oral Plaquenil   -Referral to eye doctor. See this Dr before you start Plaquenil.   -Follow up in 2 months. Sun Protection     There are two types of sun rays that are harmful to the skin. UVA rays cause skin aging and skin cancer, such as melanoma. UVB rays cause sunburns, cataracts, and also contribute to skin cancer. The American-Academy of Dermatology recommends that children and adults wear a broad spectrum, waterproof sunscreen with a Sun Protection Factor (SPF) of 30 or higher. It is important to check the ingredient label to be sure the sunscreen will protect the skin from both UVA and UVB sunrays. Your sunscreen should contain at least one of the following ingredients: titanium dioxide, zinc oxide, or avobenzone. Sunscreen will not be effective unless it is applied to all exposed skin. Sunscreens work best if they are applied 30 minutes before sun exposure. They should be reapplied every 2 hours and after any water exposure. Sunscreen is not perfect. It is important to use other methods to protect the skin from sun exposure also. Wear hats, sunglasses and other sun protective clothing when outdoors.   Stay in the shade during the peak

## 2020-03-16 ENCOUNTER — HOSPITAL ENCOUNTER (OUTPATIENT)
Age: 25
Discharge: HOME | End: 2020-03-16
Payer: COMMERCIAL

## 2020-03-16 LAB
ABSOLUTE EOS #: 0.09 K/UL (ref 0–0.44)
ABSOLUTE IMMATURE GRANULOCYTE: <0.03 K/UL (ref 0–0.3)
ABSOLUTE LYMPH #: 1.29 K/UL (ref 1.1–3.7)
ABSOLUTE MONO #: 0.42 K/UL (ref 0.1–1.2)
ALBUMIN SERPL-MCNC: 4.5 G/DL (ref 3.5–5.2)
ALBUMIN/GLOBULIN RATIO: 1.4 (ref 1–2.5)
ALP BLD-CCNC: 57 U/L (ref 40–129)
ALT SERPL-CCNC: 12 U/L (ref 5–41)
ANION GAP SERPL CALCULATED.3IONS-SCNC: 9 MMOL/L (ref 9–17)
AST SERPL-CCNC: 25 U/L
BASOPHILS # BLD: 0 % (ref 0–2)
BASOPHILS ABSOLUTE: <0.03 K/UL (ref 0–0.2)
BILIRUB SERPL-MCNC: 0.66 MG/DL (ref 0.3–1.2)
BUN BLDV-MCNC: 12 MG/DL (ref 6–20)
BUN/CREAT BLD: NORMAL (ref 9–20)
CALCIUM SERPL-MCNC: 9.4 MG/DL (ref 8.6–10.4)
CHLORIDE BLD-SCNC: 104 MMOL/L (ref 98–107)
CO2: 26 MMOL/L (ref 20–31)
CREAT SERPL-MCNC: 0.92 MG/DL (ref 0.7–1.2)
DIFFERENTIAL TYPE: ABNORMAL
EOSINOPHILS RELATIVE PERCENT: 3 % (ref 1–4)
GFR AFRICAN AMERICAN: >60 ML/MIN
GFR NON-AFRICAN AMERICAN: >60 ML/MIN
GFR SERPL CREATININE-BSD FRML MDRD: NORMAL ML/MIN/{1.73_M2}
GFR SERPL CREATININE-BSD FRML MDRD: NORMAL ML/MIN/{1.73_M2}
GLUCOSE BLD-MCNC: 83 MG/DL (ref 70–99)
HCT VFR BLD CALC: 41.6 % (ref 40.7–50.3)
HEMOGLOBIN: 13.6 G/DL (ref 13–17)
IMMATURE GRANULOCYTES: 0 %
LYMPHOCYTES # BLD: 39 % (ref 24–43)
MCH RBC QN AUTO: 30 PG (ref 25.2–33.5)
MCHC RBC AUTO-ENTMCNC: 32.7 G/DL (ref 28.4–34.8)
MCV RBC AUTO: 91.6 FL (ref 82.6–102.9)
MONOCYTES # BLD: 13 % (ref 3–12)
NRBC AUTOMATED: 0 PER 100 WBC
PDW BLD-RTO: 11.7 % (ref 11.8–14.4)
PLATELET # BLD: 251 K/UL (ref 138–453)
PLATELET ESTIMATE: ABNORMAL
PMV BLD AUTO: 9.2 FL (ref 8.1–13.5)
POTASSIUM SERPL-SCNC: 4.4 MMOL/L (ref 3.7–5.3)
RBC # BLD: 4.54 M/UL (ref 4.21–5.77)
RBC # BLD: ABNORMAL 10*6/UL
SEG NEUTROPHILS: 45 % (ref 36–65)
SEGMENTED NEUTROPHILS ABSOLUTE COUNT: 1.48 K/UL (ref 1.5–8.1)
SODIUM BLD-SCNC: 139 MMOL/L (ref 135–144)
TOTAL PROTEIN: 7.8 G/DL (ref 6.4–8.3)
WBC # BLD: 3.3 K/UL (ref 3.5–11.3)
WBC # BLD: ABNORMAL 10*3/UL

## 2020-03-16 PROCEDURE — 86235 NUCLEAR ANTIGEN ANTIBODY: CPT

## 2020-03-16 PROCEDURE — 85025 COMPLETE CBC W/AUTO DIFF WBC: CPT

## 2020-03-16 PROCEDURE — 86225 DNA ANTIBODY NATIVE: CPT

## 2020-03-16 PROCEDURE — 36415 COLL VENOUS BLD VENIPUNCTURE: CPT

## 2020-03-16 PROCEDURE — 86038 ANTINUCLEAR ANTIBODIES: CPT

## 2020-03-16 PROCEDURE — 80053 COMPREHEN METABOLIC PANEL: CPT

## 2020-03-17 ENCOUNTER — TELEPHONE (OUTPATIENT)
Dept: DERMATOLOGY | Age: 25
End: 2020-03-17

## 2020-03-17 LAB
ANA REFERENCE RANGE:: ABNORMAL
ANTI DNA DOUBLE STRANDED: 2 IU/ML
ANTI JO-1 IGG: 22 U/ML
ANTI RNP AB: 33 U/ML
ANTI SSA: 177 U/ML
ANTI SSB: 8 U/ML
ANTI-CENTROMERE: 6 U/ML
ANTI-NUCLEAR ANTIBODY (ANA): POSITIVE
ANTI-SCLERODERMA: 12 U/ML
ANTI-SMITH: 8 U/ML
HISTONE ANTIBODY: 20 U/ML

## 2020-03-17 RX ORDER — HYDROXYCHLOROQUINE SULFATE 200 MG/1
400 TABLET, FILM COATED ORAL DAILY
Qty: 60 TABLET | Refills: 2 | Status: SHIPPED | OUTPATIENT
Start: 2020-03-17 | End: 2020-05-19 | Stop reason: SDUPTHER

## 2020-03-19 ENCOUNTER — TELEPHONE (OUTPATIENT)
Dept: DERMATOLOGY | Age: 25
End: 2020-03-19

## 2020-03-19 NOTE — TELEPHONE ENCOUNTER
Patient called this morning and states that you referred her to a doctor for her Lupus, and that, doctor is not covered under her insurance. Do you have another doctor in mind?

## 2020-03-19 NOTE — TELEPHONE ENCOUNTER
Pt needs a different referral for the lupus. Called patient back relayed message from Dr. Amelia Conn, patient states she is not sure which doctor it was, she check and then call her insurance to see who is covered under her plan.

## 2020-05-03 ENCOUNTER — HOSPITAL ENCOUNTER (EMERGENCY)
Age: 25
Discharge: HOME | End: 2020-05-03
Attending: EMERGENCY MEDICINE
Payer: COMMERCIAL

## 2020-05-03 VITALS
HEART RATE: 64 BPM | DIASTOLIC BLOOD PRESSURE: 88 MMHG | BODY MASS INDEX: 21.47 KG/M2 | OXYGEN SATURATION: 99 % | WEIGHT: 150 LBS | HEIGHT: 70 IN | RESPIRATION RATE: 18 BRPM | SYSTOLIC BLOOD PRESSURE: 130 MMHG | TEMPERATURE: 98.7 F

## 2020-05-03 PROCEDURE — 99282 EMERGENCY DEPT VISIT SF MDM: CPT

## 2020-05-03 PROCEDURE — 6370000000 HC RX 637 (ALT 250 FOR IP): Performed by: STUDENT IN AN ORGANIZED HEALTH CARE EDUCATION/TRAINING PROGRAM

## 2020-05-03 RX ORDER — DIAPER,BRIEF,INFANT-TODD,DISP
EACH MISCELLANEOUS ONCE
Status: COMPLETED | OUTPATIENT
Start: 2020-05-03 | End: 2020-05-03

## 2020-05-03 RX ORDER — DIAPER,BRIEF,INFANT-TODD,DISP
EACH MISCELLANEOUS 2 TIMES DAILY
Status: DISCONTINUED | OUTPATIENT
Start: 2020-05-03 | End: 2020-05-03

## 2020-05-03 RX ORDER — DIPHENHYDRAMINE HCL 25 MG
25 CAPSULE ORAL EVERY 4 HOURS PRN
Qty: 20 CAPSULE | Refills: 0 | Status: SHIPPED | OUTPATIENT
Start: 2020-05-03 | End: 2020-05-13

## 2020-05-03 RX ORDER — DIPHENHYDRAMINE HCL 25 MG
25 TABLET ORAL ONCE
Status: COMPLETED | OUTPATIENT
Start: 2020-05-03 | End: 2020-05-03

## 2020-05-03 RX ADMIN — DIPHENHYDRAMINE HCL 25 MG: 25 TABLET ORAL at 04:30

## 2020-05-03 RX ADMIN — HYDROCORTISONE: 10 CREAM TOPICAL at 05:08

## 2020-05-03 ASSESSMENT — ENCOUNTER SYMPTOMS
CONSTIPATION: 0
CHEST TIGHTNESS: 0
NAUSEA: 0
BACK PAIN: 0
TROUBLE SWALLOWING: 0
ABDOMINAL DISTENTION: 0
ABDOMINAL PAIN: 0
WHEEZING: 0
RHINORRHEA: 0
SORE THROAT: 0
VOMITING: 0
COUGH: 0
SHORTNESS OF BREATH: 0
DIARRHEA: 0

## 2020-05-03 NOTE — ED PROVIDER NOTES
Alcohol/week: 0.0 standard drinks     Comment: socially    Drug use: No    Sexual activity: Not on file   Lifestyle    Physical activity     Days per week: Not on file     Minutes per session: Not on file    Stress: Not on file   Relationships    Social connections     Talks on phone: Not on file     Gets together: Not on file     Attends [employer] service: Not on file     Active member of club or organization: Not on file     Attends meetings of clubs or organizations: Not on file     Relationship status: Not on file    Intimate partner violence     Fear of current or ex partner: Not on file     Emotionally abused: Not on file     Physically abused: Not on file     Forced sexual activity: Not on file   Other Topics Concern    Not on file   Social History Narrative    Not on file       Family History   Problem Relation Age of Onset    Asthma Mother     High Blood Pressure Mother     Asthma Maternal Grandmother     Heart Disease Maternal Grandmother     High Blood Pressure Maternal Grandmother     Arthritis Neg Hx     Birth Defects Neg Hx     Cancer Neg Hx     Depression Neg Hx     Diabetes Neg Hx     Early Death Neg Hx     Hearing Loss Neg Hx     Kidney Disease Neg Hx     High Cholesterol Neg Hx     Learning Disabilities Neg Hx     Mental Illness Neg Hx     Mental Retardation Neg Hx     Miscarriages / Stillbirths Neg Hx     Stroke Neg Hx     Substance Abuse Neg Hx     Vision Loss Neg Hx         Allergies:  Patient has no known allergies. Home Medications:  Prior to Admission medications    Medication Sig Start Date End Date Taking?  Authorizing Provider   diphenhydrAMINE (BENADRYL) 25 MG capsule Take 1 capsule by mouth every 4 hours as needed for Itching 5/3/20 5/13/20 Yes Amy Gamez MD   hydroxychloroquine (PLAQUENIL) 200 MG tablet Take 2 tablets by mouth daily 3/17/20   Isaias Franks MD   mometasone (ELOCON) 0.1 % ointment Apply topically daily to lupus rash 3/11/20   Louis Monge

## 2020-05-03 NOTE — ED TRIAGE NOTES
Pt comes to the ED w/ c/o new hives to his forearm that started tonight. Pt used some OTC itch cream but got no relief. Pt denies any known contact to new products, foods, detergents, etc. Pt is A&Ox4, RR even and non-labored. Will continue to monitor.

## 2020-05-04 ENCOUNTER — CARE COORDINATION (OUTPATIENT)
Dept: CARE COORDINATION | Age: 25
End: 2020-05-04

## 2020-05-05 ENCOUNTER — CARE COORDINATION (OUTPATIENT)
Dept: CARE COORDINATION | Age: 25
End: 2020-05-05

## 2020-05-19 ENCOUNTER — TELEMEDICINE (OUTPATIENT)
Dept: DERMATOLOGY | Age: 25
End: 2020-05-19
Payer: COMMERCIAL

## 2020-05-19 PROCEDURE — 99213 OFFICE O/P EST LOW 20 MIN: CPT | Performed by: DERMATOLOGY

## 2020-05-19 PROCEDURE — G8428 CUR MEDS NOT DOCUMENT: HCPCS | Performed by: DERMATOLOGY

## 2020-05-19 RX ORDER — TRIAMCINOLONE ACETONIDE 0.25 MG/G
OINTMENT TOPICAL
Qty: 80 G | Refills: 2 | Status: SHIPPED | OUTPATIENT
Start: 2020-05-19

## 2020-05-19 RX ORDER — HYDROXYCHLOROQUINE SULFATE 200 MG/1
400 TABLET, FILM COATED ORAL DAILY
Qty: 60 TABLET | Refills: 5 | Status: SHIPPED | OUTPATIENT
Start: 2020-05-19 | End: 2020-09-22

## 2020-05-19 RX ORDER — MOMETASONE FUROATE 1 MG/G
OINTMENT TOPICAL
Qty: 15 G | Refills: 2 | Status: SHIPPED | OUTPATIENT
Start: 2020-05-19

## 2020-05-19 NOTE — PROGRESS NOTES
TELEHEALTH EVALUATION -- Audio/Visual (During QICTP-61 public health emergency)    Dermatology Patient Note  [id number]  [address] [facility] [address]  Dept: [phone]  Dept Fax: [phone]      VISIT DATE: 5/19/2020   REFERRING PROVIDER: No ref. provider found      Mari Thompson is a 22 y.o. male  who presents today in the office for:    No chief complaint on file. HISTORY OF PRESENT ILLNESS:  Patient presents for f/u discoid lupus  Interval history: redness, itching, irritation has resolved with treatment. He has not scheduled appt with ophtho or rheum (latter due to insurance restrictions)  Current treatment and adherence: mometasone ointment daily to plaques on face.  Plaquenil 400 mg daily  Prior treatments tried: see initial HPI      CURRENT MEDICATIONS:   Current Outpatient Medications   Medication Sig Dispense Refill    mometasone (ELOCON) 0.1 % ointment Apply once daily as needed for flare of lupus rash 15 g 2    triamcinolone (KENALOG) 0.025 % ointment Apply topically 2 times daily to lupus rash 80 g 2    hydroxychloroquine (PLAQUENIL) 200 MG tablet Take 2 tablets by mouth daily 60 tablet 5    loratadine (CLARITIN) 10 MG tablet Take 1 tablet by mouth daily 30 tablet 1    triamcinolone (NASACORT) 55 MCG/ACT nasal inhaler 2 sprays by Nasal route daily 1 Inhaler 1    ibuprofen (ADVIL;MOTRIN) 800 MG tablet Take 1 tablet by mouth every 6 hours as needed for Pain 20 tablet 0    diphenhydrAMINE (BENADRYL) 25 MG tablet Take 1 tablet by mouth every 6 hours as needed for Itching or Allergies 30 tablet 0    ondansetron (ZOFRAN) 4 MG tablet Take 1 tablet by mouth every 8 hours as needed for Nausea 10 tablet 0    pantoprazole (PROTONIX) 20 MG tablet Take 1 tablet by mouth daily for 14 days 14 tablet 0    Lactobacillus (PROBIOTIC ACIDOPHILUS) TABS Take 1 tablet by mouth daily 30 tablet 0    fluticasone (FLONASE) 50 MCG/ACT nasal spray 1 spray

## 2020-08-18 ENCOUNTER — NURSE TRIAGE (OUTPATIENT)
Dept: OTHER | Facility: CLINIC | Age: 25
End: 2020-08-18

## 2020-08-18 NOTE — TELEPHONE ENCOUNTER
=Reason for Disposition   SEVERE headache, states 'worst headache' of life    Answer Assessment - Initial Assessment Questions  1. LOCATION: \"Where does it hurt? \"       All over  2. ONSET: \"When did the headache start? \" (Minutes, hours or days)       One week ago  3. PATTERN: \"Does the pain come and go, or has it been constant since it started? \"      intermittent  4. SEVERITY: \"How bad is the pain? \" and \"What does it keep you from doing? \"  (e.g., Scale 1-10; mild, moderate, or severe)    - MILD (1-3): doesn't interfere with normal activities     - MODERATE (4-7): interferes with normal activities or awakens from sleep     - SEVERE (8-10): excruciating pain, unable to do any normal activities         severe  5. RECURRENT SYMPTOM: \"Have you ever had headaches before? \" If so, ask: \"When was the last time? \" and \"What happened that time? \"       Not this bad  6. CAUSE: \"What do you think is causing the headache? \"      unknown  7. MIGRAINE: \"Have you been diagnosed with migraine headaches? \" If so, ask: \"Is this headache similar? \"       yes  8. HEAD INJURY: \"Has there been any recent injury to the head? \"       no  9. OTHER SYMPTOMS: \"Do you have any other symptoms? \" (fever, stiff neck, eye pain, sore throat, cold symptoms)     No  10. PREGNANCY: \"Is there any chance you are pregnant? \" \"When was your last menstrual period? \"        No    Protocols used: HEADACHE-ADULT-[place]    Patient called [facility]) to schedule appointment with red flag complaint, transferred to Nurse Access for triage by [facility]. Pt calls to report symptoms of severe headache. Pt states symptoms started one week ago intermittently. Rates the pain as severe. Hx of migraines but not this severe. Pain has been severe all week. Denies stiff neck or any weakness/numbness on one side. Second level triage referred to Komal Castillo NP, she recommends patient be seen tomorrow by PCP. Informed of disposition. Care advice as documented. Instructed to call back with worsening symptoms. Soft transfer to [facility] to schedule appointment as recommended. Please do not respond to the triage nurse through this encounter. Any subsequent communication should be directly with the patient.

## 2020-08-19 ENCOUNTER — HOSPITAL ENCOUNTER (EMERGENCY)
Age: 25
Discharge: HOME | End: 2020-08-19
Attending: EMERGENCY MEDICINE
Payer: COMMERCIAL

## 2020-08-19 ENCOUNTER — APPOINTMENT (OUTPATIENT)
Dept: GENERAL RADIOLOGY | Age: 25
End: 2020-08-19
Payer: COMMERCIAL

## 2020-08-19 VITALS
DIASTOLIC BLOOD PRESSURE: 70 MMHG | RESPIRATION RATE: 15 BRPM | OXYGEN SATURATION: 99 % | HEART RATE: 78 BPM | SYSTOLIC BLOOD PRESSURE: 123 MMHG | TEMPERATURE: 98.8 F

## 2020-08-19 PROCEDURE — 71046 X-RAY EXAM CHEST 2 VIEWS: CPT

## 2020-08-19 PROCEDURE — 6370000000 HC RX 637 (ALT 250 FOR IP): Performed by: STUDENT IN AN ORGANIZED HEALTH CARE EDUCATION/TRAINING PROGRAM

## 2020-08-19 PROCEDURE — 99284 EMERGENCY DEPT VISIT MOD MDM: CPT

## 2020-08-19 PROCEDURE — 93005 ELECTROCARDIOGRAM TRACING: CPT | Performed by: STUDENT IN AN ORGANIZED HEALTH CARE EDUCATION/TRAINING PROGRAM

## 2020-08-19 RX ORDER — ONDANSETRON 4 MG/1
4 TABLET, FILM COATED ORAL ONCE
Status: COMPLETED | OUTPATIENT
Start: 2020-08-19 | End: 2020-08-19

## 2020-08-19 RX ORDER — IBUPROFEN 400 MG/1
400 TABLET ORAL EVERY 6 HOURS PRN
Qty: 120 TABLET | Refills: 0 | Status: SHIPPED | OUTPATIENT
Start: 2020-08-19 | End: 2020-12-10 | Stop reason: SDUPTHER

## 2020-08-19 RX ORDER — ACETAMINOPHEN 325 MG/1
325 TABLET ORAL EVERY 6 HOURS PRN
Qty: 120 TABLET | Refills: 0 | Status: SHIPPED | OUTPATIENT
Start: 2020-08-19 | End: 2020-12-10 | Stop reason: SDUPTHER

## 2020-08-19 RX ORDER — ONDANSETRON 4 MG/1
4 TABLET, FILM COATED ORAL EVERY 8 HOURS PRN
Qty: 20 TABLET | Refills: 0 | Status: SHIPPED | OUTPATIENT
Start: 2020-08-19 | End: 2020-12-10 | Stop reason: SDUPTHER

## 2020-08-19 RX ADMIN — ONDANSETRON HYDROCHLORIDE 4 MG: 4 TABLET, FILM COATED ORAL at 13:30

## 2020-08-19 ASSESSMENT — PAIN DESCRIPTION - LOCATION: LOCATION: HEAD

## 2020-08-19 ASSESSMENT — PAIN DESCRIPTION - PAIN TYPE: TYPE: ACUTE PAIN

## 2020-08-19 ASSESSMENT — PAIN SCALES - GENERAL: PAINLEVEL_OUTOF10: 10

## 2020-08-19 NOTE — ED PROVIDER NOTES
[facility] ED  Emergency Department Encounter  Emergency Medicine Resident     Pt Name: Parveen Oseguera  MRN: [[id number]]  [place] 1995  Date of evaluation: 20  PCP:  No primary care provider on file. CHIEF COMPLAINT       Chief Complaint   Patient presents with    Headache     x1 week    Emesis     x4 days       HISTORY OFPRESENT ILLNESS  (Location/Symptom, Timing/Onset, Context/Setting, Quality, Duration, Modifying Factors,Severity.)      Parveen Oseguera is a 26 y.o. male who presents with concern for 7 days of symptoms of headache, body aches, and nausea with emesis. Patient states last bout of emesis was this morning. Patient also reports some shortness of breath that occurs per merrily at nighttime. Patient denies any fever. Patient denies any chest pain. Patient states his symptoms are worse when he is at work, he works at a car [employer]. Patient has tried taking Tylenol and Motrin for headaches, however he has not been consistent with this medication regimen. PAST MEDICAL / SURGICAL / SOCIAL / FAMILY HISTORY      has a past medical history of ADHD (attention deficit hyperactivity disorder), Allergic rhinitis, Allergy, Asthma, Depression, Headache(784.0), Scoliosis of lumbar spine, and Vision abnormalities. has a past surgical history that includes Tonsillectomy.      Social History     Socioeconomic History    Marital status: Single     Spouse name: Not on file    Number of children: Not on file    Years of education: Not on file    Highest education level: Not on file   Occupational History    Not on file   Social Needs    Financial resource strain: Not on file    Food insecurity     Worry: Not on file     Inability: Not on file    Transportation needs     Medical: Not on file     Non-medical: Not on file   Tobacco Use    Smoking status: Former Smoker     Types: Cigarettes     Last attempt to quit: 1/3/2020     Years since quittin.6    Smokeless ondansetron (ZOFRAN) 4 MG tablet Take 1 tablet by mouth every 8 hours as needed for Nausea 8/19/20  Yes Marjorie Sterling,    mometasone (ELOCON) 0.1 % ointment Apply once daily as needed for flare of lupus rash 5/19/20   Blanca Franks MD   triamcinolone (KENALOG) 0.025 % ointment Apply topically 2 times daily to lupus rash 5/19/20   Volodymyr Dennis MD   hydroxychloroquine (PLAQUENIL) 200 MG tablet Take 2 tablets by mouth daily 5/19/20   Blanca Franks MD   loratadine (CLARITIN) 10 MG tablet Take 1 tablet by mouth daily 5/14/19   Mitesh Bernstein MD   triamcinolone (NASACORT) 55 MCG/ACT nasal inhaler 2 sprays by Nasal route daily 5/14/19   Mitesh Bernstein MD   ibuprofen (ADVIL;MOTRIN) 800 MG tablet Take 1 tablet by mouth every 6 hours as needed for Pain 3/8/19   Cezar Uribe PA-C   diphenhydrAMINE (BENADRYL) 25 MG tablet Take 1 tablet by mouth every 6 hours as needed for Itching or Allergies 8/11/18   Kala Dawson MD   ondansetron (ZOFRAN) 4 MG tablet Take 1 tablet by mouth every 8 hours as needed for Nausea 8/10/18   Kala Dawson MD   pantoprazole (PROTONIX) 20 MG tablet Take 1 tablet by mouth daily for 14 days 7/31/18 8/30/18  Tyrese Dow MD   Lactobacillus (PROBIOTIC ACIDOPHILUS) TABS Take 1 tablet by mouth daily 4/12/18   Amanda Haywood MD   fluticasone (FLONASE) 50 MCG/ACT nasal spray 1 spray by Nasal route daily 7/18/17   Rainer Jeronimo MD   cloNIDine (CATAPRES) 0.2 MG tablet Take 0.2 mg by mouth nightly 5/15/17   Historical Provider, MD       REVIEW OFSYSTEMS    (2-9 systems for level 4, 10 or more for level 5)      Constitutional ROS - No recent fevers, No recent chills  Neurological ROS - = Headache, No Syncope  Opthalmologic ROS- No eye pain, No vision changes   ENT ROS - No sore throat, No congestion  Respiratory ROS - No cough, + shortness of breath  Cardiovascular ROS - No chest pain, No palpitations   Gastrointestinal ROS - No abdominal pain, No nausea, No ondansetron (ZOFRAN) 4 MG tablet     Sig: Take 1 tablet by mouth every 8 hours as needed for Nausea     Dispense:  20 tablet     Refill:  0       DDX: Upper respiratory infection, pneumonia, COVID infection, pneumothorax, musculoskeletal strain, ACS, CAD, cardiac dysrhythmia    Initial MDM/Plan: 26 y.o. male who presents with 7 days of headache, nausea with intermittent episodes. Patient appears well, he is nontoxic, alert and oriented speaking full sentences, he appears those if he is feeling unwell, similar to possible viral upper respiratory infection. Will order an EKG, chest x-ray to assess for pneumonia versus pneumothorax versus other intrapulmonary pathology, patient is stable vital signs, he is 100% oxygen on room air, and afebrile. We will give patient information regarding COVID testing and outpatient facilities if patient is discharged from hospital.  Pending work-up negative. DIAGNOSTIC RESULTS / EMERGENCYDEPARTMENT COURSE / MDM     LABS:  Labs Reviewed - No data to display      RADIOLOGY:  XR CHEST (2 VW)   Final Result   No evidence for acute cardiopulmonary pathology. EKG  EKG Interpretation    Interpreted by me    Rhythm: normal sinus   Rate: normal  Axis: normal  Ectopy: none  Conduction: normal  ST Segments: no acute change  T Waves: no acute change  Q Waves: none    Clinical Impression: no acute changes and normal EKG    All EKG's are interpreted by the Emergency Department Physicianwho either signs or Co-signs this chart in the absence of a cardiologist.    EMERGENCY DEPARTMENT COURSE:  ED Course as of Aug 20 1735   Wed Aug 19, 2020   1436 EKG shows normal sinus rhythm, no abnormalities, chest x-ray within normal limits, no changes from prior study on August 19, 2018. Patient continues to have stable vital signs throughout course of emergency department stay, will plan to discharge patient home with Tylenol, Motrin, Zofran.   Patient does have follow-up with primary care physician within the week. [DANIKA]      ED Course User Index  [DANIKA] Adal Sterilng DO           PROCEDURES:  None    CONSULTS:  None    CRITICAL CARE:  Please see attending note    FINAL IMPRESSION      1. Intractable headache, unspecified chronicity pattern, unspecified headache type          DISPOSITION / PLAN     DISPOSITION Decision To Discharge 08/19/2020 02:16:39 PM      PATIENT REFERRED TO:  No follow-up provider specified. DISCHARGE MEDICATIONS:  Discharge Medication List as of 8/19/2020  2:34 PM      START taking these medications    Details   acetaminophen (TYLENOL) 325 MG tablet Take 1 tablet by mouth every 6 hours as needed for Pain, Disp-120 tablet,R-0Print      !! ibuprofen (IBU) 400 MG tablet Take 1 tablet by mouth every 6 hours as needed for Pain, Disp-120 tablet,R-0Print      !! ondansetron (ZOFRAN) 4 MG tablet Take 1 tablet by mouth every 8 hours as needed for Nausea, Disp-20 tablet,R-0Print       !! - Potential duplicate medications found. Please discuss with provider.           Arminda Bear DO  Emergency Medicine Resident    (Please note that portions of this note were completed with a voice recognition program.Efforts were made to edit the dictations but occasionally words are mis-transcribed.)       Arminda Bear DO  Resident  08/20/20 9601

## 2020-08-19 NOTE — PROGRESS NOTES
I assigned myself to this patient in error. I did not see, evaluate, or participate in the care of this patient.     Charlie Salas DO  PGY 3  Resident Physician Emergency Medicine  08/19/20 1:11 PM

## 2020-08-19 NOTE — ED TRIAGE NOTES
Pt to ED with c/o vomiting x4 days, headache x1 week, fatigue, body aches and mild SOB for the past few days. Denies cough, sore throat or fever. Has not taken any pain meds today. Denies constipation, diarrhea, dizziness. Denies CP.

## 2020-08-19 NOTE — ED PROVIDER NOTES
[facility]  [facility]     Emergency Department     Faculty Attestation        I performed a history and physical examination of the patient and discussed management with the resident. I reviewed the residents note and agree with the documented findings and plan of care. Any areas of disagreement are noted on the chart. I was personally present for the key portions of any procedures. I have documented in the chart those procedures where I was not present during the key portions. I have reviewed the emergency nurses triage note. I agree with the chief complaint, past medical history, past surgical history, allergies, medications, social and family history as documented unless otherwise noted below. For Physician Assistant/ Nurse Practitioner cases/documentation I have personally evaluated this patient and have completed at least one if not all key elements of the E/M (history, physical exam, and MDM). Additional findings are as noted. Vital Signs: BP: 123/70  Pulse: 78  Resp: 15  Temp: 98.8 °F (37.1 °C) SpO2: 99 %  PCP:  Coreen Fnoseca MD    Pertinent Comments: The patient is a 67-year-old male with only history of asthma and previous headaches and presents with a one-week history of occasional dry nonproductive cough with occasional posttussive emesis and moderate headache. Occasional shortness of breath but only during coughing fits. No neck pain associated with the headache and while there were fevers and chills early on those appear to have resolved. Denies abdominal pain or diarrhea. Does describe diffuse myalgias that were moderate but now mild. Physical Examination:  Clear to auscultation bilaterally, regular rate and rhythm, and   abdomen soft/nontender/nondistended/normal bowel sounds/no pulsatile masses palpated.     Neuro examination:  CN II-XII intact, Bilateral Upper and Lower extremities with 5/5 strength both proximally and distally, and intact sensation to light touch. Downgoing Babinski's Bilaterally. Finger to nose intact with no pronator drift. PERRL at 3 mm bilaterally without nystagmus. DTR's are 2+ bilaterally. HEENT examination demonstrates mild nasal congestion but posterior pharynx is clear TMs clear bilaterally. Absolutely no meningismus whatsoever completely nontoxic in appearance and presentation. Assessment/Plan:   Appears to have viral syndrome and could very well be COVID positive however no indication for admission at this time with no hypoxia and normal vital signs with negative shock index. We will obtain chest x-ray as well as antiemetic and reevaluate after with hopeful symptomatic control. EKG Interpretation    Interpreted by emergency department physician    Rhythm: normal sinus   Rate: normal  Axis: Right axis deviation  Conduction: normal  ST Segments: no acute change  T Waves: no acute change  Q Waves: no acute change    Clinical Impression:  nonspecific EKG. Critical Care  None    This patient was evaluated in the Emergency Department for symptoms described in the history of present illness. He/she was evaluated in the context of the global COVID-19 pandemic, which necessitated consideration that the patient might be at risk for infection with the SARS-CoV-2 virus that causes COVID-19. Institutional protocols and algorithms that pertain to the evaluation of patients at risk for COVID-19 are in a state of rapid change based on information released by regulatory bodies including the CDC and federal and state organizations. These policies and algorithms were followed during the patient's care in the ED. (Please note that portions of this note were completed with a voice recognition program. Efforts were made to edit the dictations but occasionally words are mis-transcribed.  Whenever words are used in this note in any gender, they shall be construed as though they were used in the gender appropriate to the circumstances; and whenever words are used in this note in the singular or plural form, they shall be construed as though they were used in the form appropriate to the circumstances.)    MD Celina Kurtzerbury  Attending Emergency Medicine Physician             Bev Harris MD  08/19/20 1326       Bev Harris MD  08/19/20 1100 [facility], [place]  08/20/20 1114

## 2020-08-21 LAB
EKG ATRIAL RATE: 67 BPM
EKG P AXIS: 54 DEGREES
EKG P-R INTERVAL: 144 MS
EKG Q-T INTERVAL: 414 MS
EKG QRS DURATION: 90 MS
EKG QTC CALCULATION (BAZETT): 437 MS
EKG R AXIS: 93 DEGREES
EKG T AXIS: 63 DEGREES
EKG VENTRICULAR RATE: 67 BPM

## 2020-09-22 ENCOUNTER — OFFICE VISIT (OUTPATIENT)
Dept: INTERNAL MEDICINE | Age: 25
End: 2020-09-22
Payer: COMMERCIAL

## 2020-09-22 VITALS
BODY MASS INDEX: 20.76 KG/M2 | WEIGHT: 145 LBS | SYSTOLIC BLOOD PRESSURE: 117 MMHG | DIASTOLIC BLOOD PRESSURE: 73 MMHG | HEART RATE: 69 BPM | HEIGHT: 70 IN

## 2020-09-22 PROCEDURE — 99213 OFFICE O/P EST LOW 20 MIN: CPT | Performed by: STUDENT IN AN ORGANIZED HEALTH CARE EDUCATION/TRAINING PROGRAM

## 2020-09-22 PROCEDURE — 1036F TOBACCO NON-USER: CPT | Performed by: STUDENT IN AN ORGANIZED HEALTH CARE EDUCATION/TRAINING PROGRAM

## 2020-09-22 PROCEDURE — 99211 OFF/OP EST MAY X REQ PHY/QHP: CPT | Performed by: INTERNAL MEDICINE

## 2020-09-22 PROCEDURE — G8420 CALC BMI NORM PARAMETERS: HCPCS | Performed by: STUDENT IN AN ORGANIZED HEALTH CARE EDUCATION/TRAINING PROGRAM

## 2020-09-22 PROCEDURE — G8427 DOCREV CUR MEDS BY ELIG CLIN: HCPCS | Performed by: STUDENT IN AN ORGANIZED HEALTH CARE EDUCATION/TRAINING PROGRAM

## 2020-09-22 RX ORDER — HYDROXYCHLOROQUINE SULFATE 200 MG/1
400 TABLET, FILM COATED ORAL DAILY
Qty: 60 TABLET | Refills: 5 | Status: CANCELLED | OUTPATIENT
Start: 2020-09-22

## 2020-09-22 RX ORDER — BUDESONIDE AND FORMOTEROL FUMARATE DIHYDRATE 80; 4.5 UG/1; UG/1
2 AEROSOL RESPIRATORY (INHALATION) 2 TIMES DAILY
Qty: 1 INHALER | Refills: 3 | Status: SHIPPED | OUTPATIENT
Start: 2020-09-22 | End: 2021-12-28 | Stop reason: SDUPTHER

## 2020-09-22 RX ORDER — BUTALBITAL, ACETAMINOPHEN AND CAFFEINE 50; 325; 40 MG/1; MG/1; MG/1
1 TABLET ORAL EVERY 4 HOURS PRN
Qty: 180 TABLET | Refills: 3 | Status: SHIPPED | OUTPATIENT
Start: 2020-09-22

## 2020-09-22 RX ORDER — VENLAFAXINE HYDROCHLORIDE 37.5 MG/1
37.5 CAPSULE, EXTENDED RELEASE ORAL DAILY
Qty: 30 CAPSULE | Refills: 2 | Status: SHIPPED | OUTPATIENT
Start: 2020-09-22

## 2020-09-22 RX ORDER — ALBUTEROL SULFATE 90 UG/1
2 AEROSOL, METERED RESPIRATORY (INHALATION) 4 TIMES DAILY PRN
Qty: 1 INHALER | Refills: 5 | Status: SHIPPED | OUTPATIENT
Start: 2020-09-22 | End: 2021-12-28 | Stop reason: SDUPTHER

## 2020-09-22 ASSESSMENT — PATIENT HEALTH QUESTIONNAIRE - PHQ9
2. FEELING DOWN, DEPRESSED OR HOPELESS: 0
SUM OF ALL RESPONSES TO PHQ QUESTIONS 1-9: 0
1. LITTLE INTEREST OR PLEASURE IN DOING THINGS: 0
SUM OF ALL RESPONSES TO PHQ9 QUESTIONS 1 & 2: 0
SUM OF ALL RESPONSES TO PHQ QUESTIONS 1-9: 0

## 2020-09-22 NOTE — PROGRESS NOTES
@[facility]@    [facility]    New Patient Note/History and Physical    Date of patient's visit: 9/22/2020    Name:  Chitra Rodriguez      YOB: 1995    Patient Care Team:  Marco A Rico MD as PCP - General (Internal Medicine)    REASON FOR VISIT: First Visit, establish care     Chief Complaint   Patient presents with    Established New Doctor    Migraine    Asthma     pt stated sob        HISTORY OF PRESENTING ILLNESS:    History was obtained from the patient. Chitra Rodriguez is a 26 y.o. is here to establish care. Headaches  He complained of worsening pounding bifrontal headaches every other day. Associated with photophobia, nausea, vomiting. Slightly alleviated withTylenol (2 pills). Denied any blurred vision, or any weakness in extremities. He stated that changes in weathed triggered them sometimes. Discoid Lupus  He follows with Dermatology, Dr. Lenny Thompson. He was last seen in ay 2019 with instructions to continue Plaquenil 400 mg daily. He was to follow up with ophthalmology for baseline retinal exam while on this medication (not done yet). He also has to schedule his 6 month appointment with Dermatology again. He also uses topical ointments for this. Asthma  Stated that he has asthma attacks at least twice weekly. He usually has coughing spells during the night. He does not use albuterol inhaler. He uses Nasacort only. Depression  He denied feeling depressed. Does not use any medications. PAST MEDICAL AND SURGICAL HISTORY:          Diagnosis Date    ADHD (attention deficit hyperactivity disorder)      and depressive disorder-[employer]    Allergic rhinitis     Allergy     seasonal    Asthma     Depression     Headache(784.0)     Scoliosis of lumbar spine 10/1/2012    Vision abnormalities            Procedure Laterality Date    TONSILLECTOMY         SOCIAL HISTORY:    TOBACCO:   reports that he quit smoking about 8 months ago. His smoking use included cigarettes. He has never used smokeless tobacco.  ETOH:   reports current alcohol use. DRUGS:  reports no history of drug use. OCCUPATION:      ALLERGIES:    No Known Allergies      HOME MEDICATION:      Current Outpatient Medications on File Prior to Visit   Medication Sig Dispense Refill    acetaminophen (TYLENOL) 325 MG tablet Take 1 tablet by mouth every 6 hours as needed for Pain 120 tablet 0    ibuprofen (IBU) 400 MG tablet Take 1 tablet by mouth every 6 hours as needed for Pain 120 tablet 0    ondansetron (ZOFRAN) 4 MG tablet Take 1 tablet by mouth every 8 hours as needed for Nausea 20 tablet 0    mometasone (ELOCON) 0.1 % ointment Apply once daily as needed for flare of lupus rash 15 g 2    triamcinolone (KENALOG) 0.025 % ointment Apply topically 2 times daily to lupus rash 80 g 2    loratadine (CLARITIN) 10 MG tablet Take 1 tablet by mouth daily 30 tablet 1    triamcinolone (NASACORT) 55 MCG/ACT nasal inhaler 2 sprays by Nasal route daily 1 Inhaler 1    diphenhydrAMINE (BENADRYL) 25 MG tablet Take 1 tablet by mouth every 6 hours as needed for Itching or Allergies 30 tablet 0    pantoprazole (PROTONIX) 20 MG tablet Take 1 tablet by mouth daily for 14 days 14 tablet 0    Lactobacillus (PROBIOTIC ACIDOPHILUS) TABS Take 1 tablet by mouth daily 30 tablet 0    fluticasone (FLONASE) 50 MCG/ACT nasal spray 1 spray by Nasal route daily 1 Bottle 3     No current facility-administered medications on file prior to visit.         FAMILY HISTORY:          Problem Relation Age of Onset    Asthma Mother     High Blood Pressure Mother     Asthma Maternal Grandmother     Heart Disease Maternal Grandmother     High Blood Pressure Maternal Grandmother     Arthritis Neg Hx     Birth Defects Neg Hx     Cancer Neg Hx     Depression Neg Hx     Diabetes Neg Hx     Early Death Neg Hx     Hearing Loss Neg Hx     Kidney Disease Neg Hx     High Cholesterol Neg Hx     Learning Disabilities Neg Hx     Mental Illness Neg Hx     Mental Retardation Neg Hx     Miscarriages / Stillbirths Neg Hx     Stroke Neg Hx     Substance Abuse Neg Hx     Vision Loss Neg Hx        REVIEW OF SYSTEMS:    Review of Systems        PHYSICAL EXAM:      Vitals:    09/22/20 0952   BP: 117/73   Site: Left Upper Arm   Position: Sitting   Cuff Size: Medium Adult   Pulse: 69   Weight: 145 lb (65.8 kg)   Height: 5' 10\" (1.778 m)     Wt Readings from Last 3 Encounters:   09/22/20 145 lb (65.8 kg)   05/03/20 150 lb (68 kg)   03/11/20 151 lb (68.5 kg)     Body mass index is 20.81 kg/m². Physical Exam    LABORATORY FINDINGS:    CBC:   Lab Results   Component Value Date    WBC 3.3 03/16/2020    HGB 13.6 03/16/2020     03/16/2020     11/16/2011     BMP:    Lab Results   Component Value Date     03/16/2020    K 4.4 03/16/2020     03/16/2020    CO2 26 03/16/2020    BUN 12 03/16/2020    CREATININE 0.92 03/16/2020    GLUCOSE 83 03/16/2020    GLUCOSE 72 11/16/2011     Hemoglobin A1C: No results found for: LABA1C  Lipid profile:   Lab Results   Component Value Date    CHOL 122 11/16/2011    TRIG 65 11/16/2011    HDL 36 11/16/2011     Lab Results   Component Value Date    LDLCHOLESTEROL 73 11/16/2011       Thyroid functions:   Lab Results   Component Value Date    TSH 1.15 07/12/2012      Hepatic functions:   Lab Results   Component Value Date    ALT 12 03/16/2020    AST 25 03/16/2020    PROT 7.8 03/16/2020    BILITOT 0.66 03/16/2020    BILIDIR 0.16 07/25/2018    LABALBU 4.5 03/16/2020    LABALBU 4.6 11/16/2011     ASSESSMENT AND PLAN:   1. Other migraine without status migrainosus, not intractable    - venlafaxine (EFFEXOR XR) 37.5 MG extended release capsule; Take 1 capsule by mouth daily  Dispense: 30 capsule; Refill: 2  - butalbital-acetaminophen-caffeine (FIORICET, ESGIC) -40 MG per tablet; Take 1 tablet by mouth every 4 hours as needed for Migraine  Dispense: 180 tablet;  Refill: 3    2. Mild intermittent asthma without complication    - budesonide-formoterol (SYMBICORT) 80-4.5 MCG/ACT AERO; Inhale 2 puffs into the lungs 2 times daily  Dispense: 1 Inhaler; Refill: 3  - albuterol sulfate HFA (VENTOLIN HFA) 108 (90 Base) MCG/ACT inhaler; Inhale 2 puffs into the lungs 4 times daily as needed for Wheezing  Dispense: 1 Inhaler; Refill: 5    3. Discoid lupus    - AFL - Reuben Graham MD, Ophthalmology, [facility]  - hydroxychloroquine (PLAQUENIL) 200 MG tablet; Take 2 tablets by mouth daily  Dispense: 60 tablet; Refill: 5    4. Positive KELVIN (antinuclear antibody)    - hydroxychloroquine (PLAQUENIL) 200 MG tablet; Take 2 tablets by mouth daily  Dispense: 60 tablet; Refill: 5      Health Maintenance  . diagmed    INSTRUCTIONS:   Return in about 6 months (around 3/22/2021) for asthma follow up. 1. Ally received counseling on the following healthy behaviors: medication adherence    2. Reviewed prior labs and health maintenance. 3. Discussed use, benefit, and side effects of prescribed medications. Barriers to medication compliance addressed. All patient questions answered. Pt voiced understanding.      4. Patient given educational materials - see patient instructions          Julian Greene MD  PGY-3, Internal medicine resident  [facility], [place], [place]  9/22/2020 11:03 AM

## 2020-09-22 NOTE — PATIENT INSTRUCTIONS
Return To Clinic around 3/22/2021 for 6 month follow up. Patient was added to wait list. Patient will be contacted by office to schedule upcoming appointment with the physician. After Visit Summary given and reviewed. The medication list included in this document is our record of what you are currently taking, including any changes that were made at today's visit. If you find any differences when compared to your medications at home, or have any questions that were not answered at your visit, please contact the office. It is very important for your care that you keep your appointment. If for some reason you are unable to keep your appointment, it is equally important that you call our office at [phone] to cancel your appointment and reschedule. Failure to do so may result in your termination from our practice. Medications have been e-scribed to pharmacy of patients choice. Referral for Ophthalmology sent to [facility]. Specialty office will contact patient for appointment. A copy of referral with contact information and address given to patient.  Patient should contact specialist office if no contact has been made to patient within 1 week.     GEOFF Valdez

## 2020-09-24 ENCOUNTER — TELEPHONE (OUTPATIENT)
Dept: INTERNAL MEDICINE | Age: 25
End: 2020-09-24

## 2020-09-24 NOTE — TELEPHONE ENCOUNTER
PA request received for Budesonide-Formoterol Fumarate 80-4. 5MCG/ACT aerosol    PA processed and submitted to pt insurance, waiting for response in regards to medication coverage

## 2020-12-05 ENCOUNTER — HOSPITAL ENCOUNTER (EMERGENCY)
Age: 25
Discharge: HOME | End: 2020-12-05
Attending: EMERGENCY MEDICINE
Payer: COMMERCIAL

## 2020-12-05 VITALS
TEMPERATURE: 97.6 F | HEIGHT: 70 IN | BODY MASS INDEX: 21.47 KG/M2 | OXYGEN SATURATION: 98 % | SYSTOLIC BLOOD PRESSURE: 124 MMHG | RESPIRATION RATE: 17 BRPM | DIASTOLIC BLOOD PRESSURE: 73 MMHG | HEART RATE: 76 BPM | WEIGHT: 150 LBS

## 2020-12-05 PROBLEM — R30.0 DYSURIA: Status: ACTIVE | Noted: 2020-12-05

## 2020-12-05 PROBLEM — Z20.2 POSSIBLE EXPOSURE TO STD: Status: ACTIVE | Noted: 2020-12-05

## 2020-12-05 LAB
-: NORMAL
AMORPHOUS: NORMAL
BACTERIA: NORMAL
BILIRUBIN URINE: NEGATIVE
CASTS UA: NORMAL /LPF (ref 0–8)
COLOR: YELLOW
COMMENT UA: ABNORMAL
CRYSTALS, UA: NORMAL /HPF
EPITHELIAL CELLS UA: NORMAL /HPF (ref 0–5)
GLUCOSE URINE: NEGATIVE
KETONES, URINE: ABNORMAL
LEUKOCYTE ESTERASE, URINE: NEGATIVE
MUCUS: NORMAL
NITRITE, URINE: NEGATIVE
OTHER OBSERVATIONS UA: NORMAL
PH UA: 6.5 (ref 5–8)
PROTEIN UA: ABNORMAL
RBC UA: NORMAL /HPF (ref 0–4)
RENAL EPITHELIAL, UA: NORMAL /HPF
SPECIFIC GRAVITY UA: 1.04 (ref 1–1.03)
TRICHOMONAS: NORMAL
TURBIDITY: ABNORMAL
URINE HGB: NEGATIVE
UROBILINOGEN, URINE: NORMAL
WBC UA: NORMAL /HPF (ref 0–5)
YEAST: NORMAL

## 2020-12-05 PROCEDURE — 6370000000 HC RX 637 (ALT 250 FOR IP): Performed by: STUDENT IN AN ORGANIZED HEALTH CARE EDUCATION/TRAINING PROGRAM

## 2020-12-05 PROCEDURE — 87491 CHLMYD TRACH DNA AMP PROBE: CPT

## 2020-12-05 PROCEDURE — 87591 N.GONORRHOEAE DNA AMP PROB: CPT

## 2020-12-05 PROCEDURE — 96372 THER/PROPH/DIAG INJ SC/IM: CPT

## 2020-12-05 PROCEDURE — 99285 EMERGENCY DEPT VISIT HI MDM: CPT

## 2020-12-05 PROCEDURE — 6360000002 HC RX W HCPCS: Performed by: EMERGENCY MEDICINE

## 2020-12-05 PROCEDURE — 6370000000 HC RX 637 (ALT 250 FOR IP): Performed by: EMERGENCY MEDICINE

## 2020-12-05 PROCEDURE — 81001 URINALYSIS AUTO W/SCOPE: CPT

## 2020-12-05 RX ORDER — POLYETHYLENE GLYCOL 3350 17 G/17G
17 POWDER, FOR SOLUTION ORAL DAILY PRN
Status: DISCONTINUED | OUTPATIENT
Start: 2020-12-05 | End: 2020-12-05 | Stop reason: HOSPADM

## 2020-12-05 RX ORDER — 0.9 % SODIUM CHLORIDE 0.9 %
1000 INTRAVENOUS SOLUTION INTRAVENOUS ONCE
Status: DISCONTINUED | OUTPATIENT
Start: 2020-12-05 | End: 2020-12-05

## 2020-12-05 RX ORDER — ONDANSETRON 2 MG/ML
4 INJECTION INTRAMUSCULAR; INTRAVENOUS EVERY 6 HOURS PRN
Status: DISCONTINUED | OUTPATIENT
Start: 2020-12-05 | End: 2020-12-05

## 2020-12-05 RX ORDER — CEFTRIAXONE SODIUM 250 MG/1
250 INJECTION, POWDER, FOR SOLUTION INTRAMUSCULAR; INTRAVENOUS ONCE
Status: COMPLETED | OUTPATIENT
Start: 2020-12-05 | End: 2020-12-05

## 2020-12-05 RX ORDER — ONDANSETRON 4 MG/1
4 TABLET, FILM COATED ORAL EVERY 6 HOURS PRN
Status: DISCONTINUED | OUTPATIENT
Start: 2020-12-05 | End: 2020-12-05 | Stop reason: HOSPADM

## 2020-12-05 RX ORDER — IBUPROFEN 400 MG/1
400 TABLET ORAL EVERY 6 HOURS PRN
Status: DISCONTINUED | OUTPATIENT
Start: 2020-12-05 | End: 2020-12-05 | Stop reason: HOSPADM

## 2020-12-05 RX ORDER — AZITHROMYCIN 250 MG/1
1000 TABLET, FILM COATED ORAL ONCE
Status: COMPLETED | OUTPATIENT
Start: 2020-12-05 | End: 2020-12-05

## 2020-12-05 RX ADMIN — CEFTRIAXONE SODIUM 250 MG: 250 INJECTION, POWDER, FOR SOLUTION INTRAMUSCULAR; INTRAVENOUS at 16:48

## 2020-12-05 RX ADMIN — AZITHROMYCIN 1000 MG: 250 TABLET, FILM COATED ORAL at 16:47

## 2020-12-05 RX ADMIN — ONDANSETRON HYDROCHLORIDE 4 MG: 4 TABLET, FILM COATED ORAL at 16:48

## 2020-12-05 ASSESSMENT — ENCOUNTER SYMPTOMS
DIARRHEA: 0
SHORTNESS OF BREATH: 0
ABDOMINAL PAIN: 0
VOMITING: 0
NAUSEA: 0
BACK PAIN: 0

## 2020-12-05 ASSESSMENT — PAIN DESCRIPTION - PAIN TYPE: TYPE: ACUTE PAIN

## 2020-12-05 ASSESSMENT — PAIN DESCRIPTION - DESCRIPTORS: DESCRIPTORS: BURNING;CRAMPING

## 2020-12-05 ASSESSMENT — PAIN SCALES - GENERAL: PAINLEVEL_OUTOF10: 8

## 2020-12-05 ASSESSMENT — PAIN DESCRIPTION - LOCATION: LOCATION: PERINEUM

## 2020-12-05 ASSESSMENT — PAIN DESCRIPTION - FREQUENCY: FREQUENCY: INTERMITTENT

## 2020-12-05 NOTE — ED NOTES
Pt came to ED c/o painful urination and some blood in the urine. Pt states that he is sexually active and it could be an STD. Pt is a&o x4 with no cardiac or respiratory distress. Pt is ambulatory and vitals are stable.      Wendy Nguyen RN  12/05/20 3017

## 2020-12-05 NOTE — ED PROVIDER NOTES
[facility]  [facility]     Emergency Department     Faculty Attestation    I performed a history and physical examination of the patient and discussed management with the resident. I reviewed the residents note and agree with the documented findings and plan of care. Any areas of disagreement are noted on the chart. I was personally present for the key portions of any procedures. I have documented in the chart those procedures where I was not present during the key portions. I have reviewed the emergency nurses triage note. I agree with the chief complaint, past medical history, past surgical history, allergies, medications, social and family history as documented unless otherwise noted below. For Physician Assistant/ Nurse Practitioner cases/documentation I have personally evaluated this patient and have completed at least one if not all key elements of the E/M (history, physical exam, and MDM). Additional findings are as noted. Patient presents with burning with urination. He says this is been ongoing for the past couple of days. He says he does have some mild abdominal discomfort as well. He denies penile discharge. He denies testicular pain. He denies fever, chest pain, shortness of breath, cough, nausea or vomiting. On exam, patient is sitting in a chair and appears well. Lungs clear to auscultation bilaterally heart sounds are normal.  Abdomen is soft and nontender. No rebound or guarding is present. Will check urinalysis and GC/chlamydia and plan to treat with Rocephin and Zithromax.       Sariah Campos MD  Attending Emergency  Physician             Solo Brower MD  12/05/20 5121

## 2020-12-05 NOTE — ED PROVIDER NOTES
[address] ED  Emergency Department Encounter  EmergencyMedicine Resident     Pt Kelly Juarez Jose Cruz  MRN: [de-identified]  [place] 1995  Date of evaluation: 20  PCP:  Vamshi Gomez MD    CHIEF COMPLAINT       Chief Complaint   Patient presents with    Dysuria       HISTORY OF PRESENT ILLNESS  (Location/Symptom, Timing/Onset, Context/Setting, Quality, Duration, Modifying Factors, Severity.)      Alex Seay is a 26 y.o. male who presents with dysuria for one day not associated with penile discharge, hematuria, fever, abdominal pain, n/v, CVA tenderness. Burning pain, moderately intentensity, nothing exacerbates or alleviates. Sexually active, M2M, states he does practice \"safe sex. \" Last sexual encounter yesterday. Denies prior STDs. PAST MEDICAL / SURGICAL / SOCIAL / FAMILY HISTORY      has a past medical history of ADHD (attention deficit hyperactivity disorder), Allergic rhinitis, Allergy, Asthma, Depression, Headache(784.0), Scoliosis of lumbar spine, and Vision abnormalities. has a past surgical history that includes Tonsillectomy. Social History     Socioeconomic History    Marital status: Single     Spouse name: Not on file    Number of children: Not on file    Years of education: Not on file    Highest education level: Not on file   Occupational History    Not on file   Social Needs    Financial resource strain: Not on file    Food insecurity     Worry: Not on file     Inability: Not on file    Transportation needs     Medical: Not on file     Non-medical: Not on file   Tobacco Use    Smoking status: Former Smoker     Types: Cigarettes     Last attempt to quit: 1/3/2020     Years since quittin.9    Smokeless tobacco: Never Used   Substance and Sexual Activity    Alcohol use:  Yes     Alcohol/week: 1.0 standard drinks     Types: 1 Standard drinks or equivalent per week     Comment: socially    Drug use: No    Sexual activity: Yes     Partners: Male Lifestyle    Physical activity     Days per week: Not on file     Minutes per session: Not on file    Stress: Not on file   Relationships    Social connections     Talks on phone: Not on file     Gets together: Not on file     Attends Restoration service: Not on file     Active member of club or organization: Not on file     Attends meetings of clubs or organizations: Not on file     Relationship status: Not on file    Intimate partner violence     Fear of current or ex partner: Not on file     Emotionally abused: Not on file     Physically abused: Not on file     Forced sexual activity: Not on file   Other Topics Concern    Not on file   Social History Narrative    Not on file       Family History   Problem Relation Age of Onset    Asthma Mother     High Blood Pressure Mother     Asthma Maternal Grandmother     Heart Disease Maternal Grandmother     High Blood Pressure Maternal Grandmother     Arthritis Neg Hx     Birth Defects Neg Hx     Cancer Neg Hx     Depression Neg Hx     Diabetes Neg Hx     Early Death Neg Hx     Hearing Loss Neg Hx     Kidney Disease Neg Hx     High Cholesterol Neg Hx     Learning Disabilities Neg Hx     Mental Illness Neg Hx     Mental Retardation Neg Hx     Miscarriages / Stillbirths Neg Hx     Stroke Neg Hx     Substance Abuse Neg Hx     Vision Loss Neg Hx        Allergies:  Patient has no known allergies. Home Medications:  Prior to Admission medications    Medication Sig Start Date End Date Taking?  Authorizing Provider   venlafaxine (EFFEXOR XR) 37.5 MG extended release capsule Take 1 capsule by mouth daily 9/22/20   Loulou Quinones MD   butalbital-acetaminophen-caffeine (FIORICET, ESGIC) -40 MG per tablet Take 1 tablet by mouth every 4 hours as needed for Migraine 9/22/20   Loulou Quinones MD   budesonide-formoterol (SYMBICORT) 80-4.5 MCG/ACT AERO Inhale 2 puffs into the lungs 2 times daily 9/22/20   Loulou Quinones MD   albuterol sulfate HFA (VENTOLIN HFA) 108 (90 Base) MCG/ACT inhaler Inhale 2 puffs into the lungs 4 times daily as needed for Wheezing 9/22/20   Isauro Forrest MD   acetaminophen (TYLENOL) 325 MG tablet Take 1 tablet by mouth every 6 hours as needed for Pain 8/19/20   Mirta Sterling,    ibuprofen (IBU) 400 MG tablet Take 1 tablet by mouth every 6 hours as needed for Pain 8/19/20   Mirta Sterling DO   ondansetron (ZOFRAN) 4 MG tablet Take 1 tablet by mouth every 8 hours as needed for Nausea 8/19/20   Marjorie Sterling,    mometasone (ELOCON) 0.1 % ointment Apply once daily as needed for flare of lupus rash 5/19/20   Saundra Franks MD   triamcinolone (KENALOG) 0.025 % ointment Apply topically 2 times daily to lupus rash 5/19/20   Saundra Franks MD   loratadine (CLARITIN) 10 MG tablet Take 1 tablet by mouth daily 5/14/19   Irvin Santana MD   triamcinolone (NASACORT) 55 MCG/ACT nasal inhaler 2 sprays by Nasal route daily 5/14/19   Irvin Snatana MD   diphenhydrAMINE (BENADRYL) 25 MG tablet Take 1 tablet by mouth every 6 hours as needed for Itching or Allergies 8/11/18   David Karimi MD   pantoprazole (PROTONIX) 20 MG tablet Take 1 tablet by mouth daily for 14 days 7/31/18 9/22/20  Froylan Howe MD   Lactobacillus (PROBIOTIC ACIDOPHILUS) TABS Take 1 tablet by mouth daily 4/12/18   Mansoor Berry MD   fluticasone (FLONASE) 50 MCG/ACT nasal spray 1 spray by Nasal route daily 7/18/17   Rainer Holm MD       REVIEW OF SYSTEMS    (2-9 systems for level 4, 10 or more for level 5)      Review of Systems   Constitutional: Positive for chills. Negative for fever. HENT: Negative for mouth sores. Respiratory: Negative for shortness of breath. Cardiovascular: Negative for chest pain. Gastrointestinal: Negative for abdominal pain, diarrhea, nausea and vomiting. Genitourinary: Positive for difficulty urinating and dysuria.  Negative for decreased urine volume, discharge, enuresis, flank pain, frequency, genital sores, hematuria, penile pain, penile swelling, scrotal swelling, testicular pain and urgency. Musculoskeletal: Negative for back pain. Skin: Negative for rash. Neurological: Negative for dizziness and weakness. Hematological: Negative for adenopathy. Psychiatric/Behavioral: Negative for sleep disturbance. All other systems reviewed and are negative. PHYSICAL EXAM   (up to 7 for level 4, 8 or more for level 5)      INITIAL VITALS:   /73   Pulse 76   Temp 97.6 °F (36.4 °C)   Resp 17   Ht 5' 10\" (1.778 m)   Wt 150 lb (68 kg)   SpO2 98%   BMI 21.52 kg/m²     Physical Exam  Vitals signs and nursing note reviewed. Exam conducted with a chaperone present. Constitutional:       General: He is not in acute distress. Appearance: Normal appearance. He is normal weight. He is not ill-appearing. HENT:      Head: Normocephalic and atraumatic. Right Ear: External ear normal.      Left Ear: External ear normal.      Nose: Nose normal.      Mouth/Throat:      Mouth: Mucous membranes are moist.      Pharynx: Oropharynx is clear. No oropharyngeal exudate. Eyes:      Conjunctiva/sclera: Conjunctivae normal.   Cardiovascular:      Rate and Rhythm: Normal rate and regular rhythm. Heart sounds: No murmur. Pulmonary:      Effort: No respiratory distress. Abdominal:      Tenderness: There is no abdominal tenderness. Genitourinary:     Pubic Area: No rash. Penis: Normal.       Scrotum/Testes: Normal.      Epididymis:      Right: Normal.      Left: Normal.   Musculoskeletal:         General: No signs of injury. Neurological:      Mental Status: He is alert.          DIFFERENTIAL  DIAGNOSIS     PLAN (LABS / IMAGING / EKG):  Orders Placed This Encounter   Procedures    C.trachomatis N.gonorrhoeae DNA, Urine    Urinalysis Reflex to Culture    DIET GENERAL;    Vital signs every 4 hours    Up as tolerated    Up with assistance    Intake and output    Full Code    Initiate

## 2020-12-07 LAB
C. TRACHOMATIS DNA ,URINE: NEGATIVE
N. GONORRHOEAE DNA, URINE: NEGATIVE
SPECIMEN DESCRIPTION: NORMAL

## 2020-12-10 ENCOUNTER — APPOINTMENT (OUTPATIENT)
Dept: GENERAL RADIOLOGY | Age: 25
End: 2020-12-10
Payer: COMMERCIAL

## 2020-12-10 ENCOUNTER — HOSPITAL ENCOUNTER (EMERGENCY)
Age: 25
Discharge: HOME | End: 2020-12-10
Attending: EMERGENCY MEDICINE
Payer: COMMERCIAL

## 2020-12-10 VITALS
TEMPERATURE: 98.3 F | RESPIRATION RATE: 16 BRPM | SYSTOLIC BLOOD PRESSURE: 96 MMHG | BODY MASS INDEX: 21.47 KG/M2 | HEART RATE: 83 BPM | WEIGHT: 150 LBS | DIASTOLIC BLOOD PRESSURE: 66 MMHG | HEIGHT: 70 IN | OXYGEN SATURATION: 98 %

## 2020-12-10 LAB
TROPONIN INTERP: NORMAL
TROPONIN T: NORMAL NG/ML
TROPONIN, HIGH SENSITIVITY: <6 NG/L (ref 0–22)

## 2020-12-10 PROCEDURE — 93005 ELECTROCARDIOGRAM TRACING: CPT | Performed by: STUDENT IN AN ORGANIZED HEALTH CARE EDUCATION/TRAINING PROGRAM

## 2020-12-10 PROCEDURE — 84484 ASSAY OF TROPONIN QUANT: CPT

## 2020-12-10 PROCEDURE — 71045 X-RAY EXAM CHEST 1 VIEW: CPT

## 2020-12-10 PROCEDURE — 99285 EMERGENCY DEPT VISIT HI MDM: CPT

## 2020-12-10 PROCEDURE — 6370000000 HC RX 637 (ALT 250 FOR IP): Performed by: STUDENT IN AN ORGANIZED HEALTH CARE EDUCATION/TRAINING PROGRAM

## 2020-12-10 RX ORDER — ACETAMINOPHEN 325 MG/1
650 TABLET ORAL EVERY 6 HOURS PRN
Qty: 112 TABLET | Refills: 0 | Status: SHIPPED | OUTPATIENT
Start: 2020-12-10 | End: 2020-12-24

## 2020-12-10 RX ORDER — IBUPROFEN 800 MG/1
800 TABLET ORAL ONCE
Status: COMPLETED | OUTPATIENT
Start: 2020-12-10 | End: 2020-12-10

## 2020-12-10 RX ORDER — IBUPROFEN 400 MG/1
400 TABLET ORAL EVERY 6 HOURS PRN
Qty: 56 TABLET | Refills: 0 | Status: SHIPPED | OUTPATIENT
Start: 2020-12-10 | End: 2020-12-24

## 2020-12-10 RX ORDER — ONDANSETRON 4 MG/1
4 TABLET, FILM COATED ORAL ONCE
Status: COMPLETED | OUTPATIENT
Start: 2020-12-10 | End: 2020-12-10

## 2020-12-10 RX ORDER — ONDANSETRON 4 MG/1
4 TABLET, FILM COATED ORAL EVERY 8 HOURS PRN
Qty: 15 TABLET | Refills: 0 | Status: SHIPPED | OUTPATIENT
Start: 2020-12-10 | End: 2020-12-15

## 2020-12-10 RX ADMIN — IBUPROFEN 800 MG: 800 TABLET, FILM COATED ORAL at 14:49

## 2020-12-10 RX ADMIN — ONDANSETRON HYDROCHLORIDE 4 MG: 4 TABLET, FILM COATED ORAL at 14:49

## 2020-12-10 ASSESSMENT — PAIN SCALES - GENERAL
PAINLEVEL_OUTOF10: 5
PAINLEVEL_OUTOF10: 3

## 2020-12-10 ASSESSMENT — PAIN DESCRIPTION - LOCATION: LOCATION: CHEST

## 2020-12-10 NOTE — ED PROVIDER NOTES
[facility]     Emergency Department     Faculty Note/ Attestation      Pt Name: Tenzin Myers                                       MRN: [id number]  [place] 1995  Date of evaluation: 12/10/2020    Patients PCP:    Shahbaz Escamilla MD    Attestation  I performed a history and physical examination of the patient and discussed management with the resident. I reviewed the residents note and agree with the documented findings and plan of care. Any areas of disagreement are noted on the chart. I was personally present for the key portions of any procedures. I have documented in the chart those procedures where I was not present during the key portions. I have reviewed the emergency nurses triage note. I agree with the chief complaint, past medical history, past surgical history, allergies, medications, social and family history as documented unless otherwise noted below. For Physician Assistant/ Nurse Practitioner cases/documentation I have personally evaluated this patient and have completed at least one if not all key elements of the E/M (history, physical exam, and MDM). Additional findings are as noted. Initial Screens:        [place] Coma Scale  Eye Opening: Spontaneous  Best Verbal Response: Oriented    Vitals:    Vitals:    12/10/20 1416 12/10/20 1426   BP:  96/66   Pulse:  83   Resp:  16   Temp: 98.3 °F (36.8 °C)    TempSrc: Temporal    SpO2:  98%   Weight:  150 lb (68 kg)   Height:  5' 10\" (1.778 m)       CHIEF COMPLAINT       Chief Complaint   Patient presents with    Cough    Pharyngitis    Headache    Emesis       The pt is a 27 YO M with no high risk factors presents with cough aches, and pain and nausea with vomiting.       DIAGNOSTIC RESULTS     RADIOLOGY:   XR CHEST PORTABLE    (Results Pending)   EKG Interpretation   Interpreted by [facility] DO    Rhythm: normal sinus   Rate: normal  Axis: rightward  Ectopy: none  Conduction: normal  ST Segments: normal  T Waves: normal  Q Waves: none    Clinical Impression: nonspecific        LABS:  Labs Reviewed   TROPONIN       EMERGENCY DEPARTMENT COURSE:     -------------------------  BP: 96/66, Temp: 98.3 °F (36.8 °C), Pulse: 83, Resp: 16  Physical Exam  Constitutional:       Appearance: He is well-developed. He is not diaphoretic. HENT:      Head: Normocephalic and atraumatic. Right Ear: External ear normal.      Left Ear: External ear normal.   Eyes:      General: No scleral icterus. Right eye: No discharge. Left eye: No discharge. Neck:      Musculoskeletal: Normal range of motion. Trachea: No tracheal deviation. Pulmonary:      Effort: Pulmonary effort is normal. No respiratory distress. Breath sounds: No stridor. Musculoskeletal: Normal range of motion. Skin:     General: Skin is warm and dry. Neurological:      Mental Status: He is alert and oriented to person, place, and time. Coordination: Coordination normal.   Psychiatric:         Behavior: Behavior normal.         Comments  The patient's signs and symptoms are consistent with an acute mild viral illness. At this time there is significant evidence of Covid-19 community spread due to this pandemic, and I feel the patient most likely has mild Covid-19 or other viral illness. The patient is nontoxic and well appearing, no evidence of hypoxia or impending respiratory failure. The patient is tolerating PO. I do not feel the patient has evidence of significant dehydration or end organ failure at this time. The patient does not have risk factors for severe disease such as cardiovascular disease, hypertension, uncontrolled diabetes, chronic respiratory disease, underlying malignancy, immunocompromised, Age > 60 years. I do not feel the patient has an emergent medical condition at this time.   At this time there is a critical shortage of SARS-Coronavirus-2 PCR testing, very limited criteria for testing, and we are unable to test the patient at this time since criteria is not met. Direct patient contact is avoided at this time due to the critically low supplies of PPE worldwide and an effort to ruth appropriate use of PPE. I performed a medical screening exam that is compliant with the Declaration of [facility] in the [place], secondary to the Pandemic Infectious Disease of SARS-Coronavirus-2. We are not testing for influenza or other viral etiologies at this time due to the significant evidence of virus coinfections during this pandemic. The patient is referred to appropriate outpatient follow up through their PCP or [facility]. I discussed with the patient home isolation measures, anticipatory guidance, discharge instructions, and to return immediately for worsening of symptoms. The patient is agreeable with this plan. Anthony Sanches,, DO, RDMS.   Attending Emergency Physician          Anthony Sanches, DO  12/10/20 [address] 63., DO  12/10/20 [id number]

## 2020-12-10 NOTE — ED NOTES
The following labs were labeled with patient stickers & tubed to lab;    [x]Lavender   []On Ice  [x]Blue  [x]Green/ Yellow  []Green/ Black []On Ice  []Pink  []Red  []Yellow    []COVID-19 Swab []Rapid    []Urine Sample  []Pelvic Cultures    []Blood [id number] [facility] Tonie Campos RN  12/10/20 [id number]

## 2020-12-10 NOTE — ED PROVIDER NOTES
[facility]  ED  Emergency Department Encounter  Emergency Medicine Resident     Pt Name: Kurt Antony  MRN: [[id number]]  [place] [id number]  Date of evaluation: 12/11/20  PCP:  Lalo Ludwig MD    [address] [place] [address]       Chief Complaint   Patient presents with    Cough    Pharyngitis    Headache    Emesis       HISTORY OFPRESENT ILLNESS  (Location/Symptom, Timing/Onset, Context/Setting, Quality, Duration, Modifying Factors,Severity.)      Kurt Antony is a 26 y.o. male with past medical history of ADHD, anxiety, depression who presents with 3 days of fevers, chills, sore throat with raspy voice and 2 days of nausea, vomiting, chest pain, shortness of breath and full body myalgias. Patient denies diarrhea. He has been eating and drinking okay. He has not been taking any medications for his symptoms but states he has been drinking Pedialyte. He denies any recent sick contact or contact with anyone who was tested positive for Covid. Denies recent travel. PAST MEDICAL / SURGICAL / SOCIAL / FAMILY HISTORY      has a past medical history of ADHD (attention deficit hyperactivity disorder), Allergic rhinitis, Allergy, Asthma, Depression, Headache(784.0), Scoliosis of lumbar spine, and Vision abnormalities. has a past surgical history that includes Tonsillectomy. Social:  reports that he quit smoking about 11 months ago. His smoking use included cigarettes. He has never used smokeless tobacco. He reports current alcohol use of about 1.0 standard drinks of alcohol per week. He reports that he does not use drugs.     Family Hx:   Family History   Problem Relation Age of Onset    Asthma Mother     High Blood Pressure Mother     Asthma Maternal Grandmother     Heart Disease Maternal Grandmother     High Blood Pressure Maternal Grandmother     Arthritis Neg Hx     Birth Defects Neg Hx     Cancer Neg Hx     Depression Neg Hx     Diabetes Neg Hx     Early Death Neg Hx     Hearing Loss Neg Hx     Kidney Disease Neg Hx     High Cholesterol Neg Hx     Learning Disabilities Neg Hx     Mental Illness Neg Hx     Mental Retardation Neg Hx     Miscarriages / Stillbirths Neg Hx     Stroke Neg Hx     Substance Abuse Neg Hx     Vision Loss Neg Hx         Allergies:  Patient has no known allergies. Home Medications:  Prior to Admission medications    Medication Sig Start Date End Date Taking?  Authorizing Provider   acetaminophen (TYLENOL) 325 MG tablet Take 2 tablets by mouth every 6 hours as needed for Pain 12/10/20 12/24/20 Yes Daina Khalil DO   ondansetron (ZOFRAN) 4 MG tablet Take 1 tablet by mouth every 8 hours as needed for Nausea or Vomiting 12/10/20 12/15/20 Yes Daina Khalil DO   ibuprofen (IBU) 400 MG tablet Take 1 tablet by mouth every 6 hours as needed for Pain 12/10/20 12/24/20 Yes Daina Khalil DO   venlafaxine (EFFEXOR XR) 37.5 MG extended release capsule Take 1 capsule by mouth daily 9/22/20   Jeovany Franklin MD   butalbital-acetaminophen-caffeine (FIORICET, ESGIC) -40 MG per tablet Take 1 tablet by mouth every 4 hours as needed for Migraine 9/22/20   Jeovany Franklin MD   budesonide-formoterol (SYMBICORT) 80-4.5 MCG/ACT AERO Inhale 2 puffs into the lungs 2 times daily 9/22/20   Jeovany Franklin MD   albuterol sulfate HFA (VENTOLIN HFA) 108 (90 Base) MCG/ACT inhaler Inhale 2 puffs into the lungs 4 times daily as needed for Wheezing 9/22/20   Jeovany Franklin MD   mometasone (ELOCON) 0.1 % ointment Apply once daily as needed for flare of lupus rash 5/19/20   Yudith Franks MD   triamcinolone (KENALOG) 0.025 % ointment Apply topically 2 times daily to lupus rash 5/19/20   Yudith Franks MD   loratadine (CLARITIN) 10 MG tablet Take 1 tablet by mouth daily 5/14/19   Brian Danielson MD   triamcinolone (NASACORT) 55 MCG/ACT nasal inhaler 2 sprays by Nasal route daily 5/14/19   Brian Danielson MD   diphenhydrAMINE (BENADRYL) 25 MG tablet Take 1 tablet by mouth every 6 hours as needed for Itching or Allergies 8/11/18   Naty Sharp MD   pantoprazole (PROTONIX) 20 MG tablet Take 1 tablet by mouth daily for 14 days 7/31/18 9/22/20  Miguel Baeza MD   Lactobacillus (PROBIOTIC ACIDOPHILUS) TABS Take 1 tablet by mouth daily 4/12/18   Giulia Love MD   fluticasone [facility]) 50 MCG/ACT nasal spray 1 spray by Nasal route daily 7/18/17   Rainer Berry MD       REVIEW OFSYSTEMS    (2-9 systems for level 4, 10 or more for level 5)      Review of Systems   Constitutional: Positive for activity change and fever. Negative for chills. HENT: Positive for congestion, sore throat and voice change. Negative for rhinorrhea. Respiratory: Positive for cough and shortness of breath. Cardiovascular: Positive for chest pain. Negative for leg swelling. Gastrointestinal: Positive for nausea and vomiting. Negative for abdominal pain, constipation and diarrhea. Genitourinary: Negative for decreased urine volume, difficulty urinating and hematuria. Musculoskeletal: Positive for myalgias. Negative for arthralgias, back pain and neck pain. Skin: Negative for rash. Neurological: Positive for headaches. Negative for dizziness and numbness. All other systems reviewed and are negative. PHYSICAL EXAM   (up to 7 for level 4, 8 or more forlevel 5)      INITIAL VITALS:   Vitals:    12/10/20 1426   BP: 96/66   Pulse: 83   Resp: 16   Temp:    SpO2: 98%        Physical Exam  Vitals signs and nursing note reviewed. Constitutional:       General: He is not in acute distress. Appearance: Normal appearance. He is not toxic-appearing. Comments: Adult male sitting up in bed in no acute distress. He speaks in full sentences and answers questions appropriately. HENT:      Head: Normocephalic and atraumatic. Right Ear: Tympanic membrane and external ear normal.      Left Ear: Tympanic membrane and external ear normal.      Nose: Nose normal. No congestion or rhinorrhea. Mouth/Throat:      Mouth: Mucous membranes are moist.      Pharynx: Oropharynx is clear. Posterior oropharyngeal erythema present. No oropharyngeal exudate. Comments: Mild posterior oropharyngeal erythema. No edema or exudates. Tonsils are absent. Mucous membranes are moist.  Eyes:      Conjunctiva/sclera: Conjunctivae normal.      Pupils: Pupils are equal, round, and reactive to light. Neck:      Musculoskeletal: Normal range of motion and neck supple. No neck rigidity or muscular tenderness. Cardiovascular:      Rate and Rhythm: Normal rate and regular rhythm. Heart sounds: No murmur. No friction rub. No gallop. Pulmonary:      Effort: Pulmonary effort is normal. No respiratory distress. Breath sounds: Normal breath sounds. No stridor. No wheezing, rhonchi or rales. Abdominal:      General: Abdomen is flat. Bowel sounds are normal. There is no distension. Palpations: Abdomen is soft. There is no mass. Tenderness: There is no abdominal tenderness. There is no guarding. Musculoskeletal: Normal range of motion. General: No swelling or tenderness. Right lower leg: No edema. Left lower leg: No edema. Lymphadenopathy:      Cervical: No cervical adenopathy. Skin:     General: Skin is warm and dry. Capillary Refill: Capillary refill takes less than 2 seconds. Findings: No rash. Neurological:      General: No focal deficit present. Mental Status: He is alert.    Psychiatric:         Mood and Affect: Mood normal.         Behavior: Behavior normal.         DIFFERENTIAL  DIAGNOSIS     DDX: Viral illness, influenza, Covid, strep pharyngitis, viral pharyngitis, dehydration, electrolyte abnormality, pneumonia, bronchitis, pneumothorax, ACS    Initial MDM/Plan: 26 y.o. male with past medical history of ADHD, anxiety, depression who presents with 3 days of fevers, chills, sore throat with raspy voice and 2 days of nausea, vomiting, chest pain, shortness of breath and full body myalgias. On physical exam, patient is awake, alert, speaking in full sentences and nontoxic appearing. He has completely normal vitals, afebrile with O2 sats 98% on room air. Lungs are clear to auscultation with decreased breath sounds to the left lower lobe. No wheezes or rhonchi. This could be related to infectious etiology versus previous history of smoking. Will obtain chest x-ray to evaluate. Patient also complaining of chest pain so will obtain EKG and troponin. Because of the length of patient's symptoms, troponin level should be sufficientone to indicate ACS. Suspect patient symptoms are due to a viral illness such as influenza versus Covid. Discussed this with the patient including that he should quarantine for the next 14 days and treat symptomatically with lots of rest and fluids. We will treat symptomatically here with Zofran and Motrin. DIAGNOSTIC RESULTS / EMERGENCYDEPARTMENT COURSE / MDM     LABS:  Results for orders placed or performed during the hospital encounter of 12/10/20   Troponin   Result Value Ref Range    Troponin, High Sensitivity <6 0 - 22 ng/L    Troponin T NOT REPORTED <0.03 ng/mL    Troponin Interp NOT REPORTED    EKG 12 Lead   Result Value Ref Range    Ventricular Rate 68 BPM    Atrial Rate 68 BPM    P-R Interval 142 ms    QRS Duration 104 ms    Q-T Interval 376 ms    QTc Calculation (Bazett) 399 ms    P Axis 83 degrees    R Axis 96 degrees    T Axis 78 degrees         RADIOLOGY:  Xr Chest Portable    Result Date: 12/10/2020  EXAMINATION: ONE XRAY VIEW OF THE CHEST 12/10/2020 3:35 pm COMPARISON: 08/19/2020 HISTORY: ORDERING SYSTEM PROVIDED HISTORY: chest pain, SOB, may have covid TECHNOLOGIST PROVIDED HISTORY: chest pain, SOB, may have covid FINDINGS: The lungs are without acute focal process. There is no effusion or pneumothorax. The cardiomediastinal silhouette is stable. The osseous structures are stable. No acute process. EKG  Normal sinus rhythm, rate 68, rightward  axis, , , QT/QTc 376/399, no T wave abnormality, no ST elevation or depression, no Q waves    All EKG's are interpreted by the Emergency Department Physicianwho either signs or Co-signs this chart in the absence of a cardiologist.    EMERGENCY DEPARTMENT COURSE:  ED Course as of Dec 11 1042   Thu Dec 10, 2020   1549 XR CHEST PORTABLE [KA]  MPRESSION:  No acute process. 1555    1620 Troponin, High Sensitivity: <6 [KA]    Patient's work-up is unremarkable including negative chest x-ray and troponin. Again discussed with him that I suspect his symptoms are due to a mild case of Covid versus influenza. Recommended symptomatic treatment and provided prescription for Tylenol and Motrin and how to alternate them, as well as Zofran for nausea. Discussed with him over-the-counter supplements such as vitamin D, vitamin C, zinc amd risks and benefits of these medications. Also provided him with information regarding transmission and spread of Covid, as well as where he can be tested on an outpatient basis. Informed him that he did not need to be tested, given that his symptoms are so suspicious for the disease and that he should treat himself as if he has it. Work note was also provided. Patient was discharged home in stable condition. ED Course User Index  [KA] Ceferino Porter DO         PROCEDURES:  None    CONSULTS:  None      FINAL IMPRESSION      1. Non-intractable vomiting with nausea, unspecified vomiting type    2.  COVID-19        DISPOSITION / PLAN     DISPOSITION Decision To Discharge 12/10/2020 03:55:37 PM      PATIENT REFERRED TO:  Hayder Giang MD  [address] [address] [phone]    Schedule an appointment as soon as possible for a visit       [facility] ED  [facility]  [phone]    As needed      DISCHARGE MEDICATIONS:  Discharge Medication List as of 12/10/2020  4:07 PM          Lennie Silverio DO  Emergency Medicine Resident    (Please note that portions of this note were completed with a voice recognition program.Efforts were made to edit the dictations but occasionally words are mis-transcribed.)        Lennie Silverio DO  Resident  12/11/20 0406

## 2020-12-11 ENCOUNTER — CARE COORDINATION (OUTPATIENT)
Dept: FAMILY MEDICINE CLINIC | Age: 25
End: 2020-12-11

## 2020-12-11 LAB
EKG ATRIAL RATE: 68 BPM
EKG P AXIS: 83 DEGREES
EKG P-R INTERVAL: 142 MS
EKG Q-T INTERVAL: 376 MS
EKG QRS DURATION: 104 MS
EKG QTC CALCULATION (BAZETT): 399 MS
EKG R AXIS: 96 DEGREES
EKG T AXIS: 78 DEGREES
EKG VENTRICULAR RATE: 68 BPM

## 2020-12-11 ASSESSMENT — ENCOUNTER SYMPTOMS
VOMITING: 1
NAUSEA: 1
BACK PAIN: 0
COUGH: 1
SORE THROAT: 1
DIARRHEA: 0
CONSTIPATION: 0
SHORTNESS OF BREATH: 1
RHINORRHEA: 0
VOICE CHANGE: 1
ABDOMINAL PAIN: 0

## 2020-12-11 NOTE — CARE COORDINATION
ACM attempted outreach for ED follow up, viral symptoms, COVID 19 Monitoring    Straight to VM, and unable to LVM at this time.

## 2020-12-11 NOTE — PROGRESS NOTES
This patient was assigned to me by error. This patient was never interviewed nor examined by me. I did not participate in the care of this patient.     Diamond Dow, PGY-3

## 2020-12-18 ENCOUNTER — APPOINTMENT (OUTPATIENT)
Dept: GENERAL RADIOLOGY | Age: 25
End: 2020-12-18
Payer: COMMERCIAL

## 2020-12-18 ENCOUNTER — HOSPITAL ENCOUNTER (EMERGENCY)
Age: 25
Discharge: HOME | End: 2020-12-18
Attending: EMERGENCY MEDICINE
Payer: COMMERCIAL

## 2020-12-18 VITALS
BODY MASS INDEX: 20.76 KG/M2 | WEIGHT: 145 LBS | DIASTOLIC BLOOD PRESSURE: 62 MMHG | TEMPERATURE: 98.3 F | HEART RATE: 77 BPM | OXYGEN SATURATION: 100 % | RESPIRATION RATE: 14 BRPM | HEIGHT: 70 IN | SYSTOLIC BLOOD PRESSURE: 98 MMHG

## 2020-12-18 LAB
DIRECT EXAM: NORMAL
Lab: NORMAL
SPECIMEN DESCRIPTION: NORMAL

## 2020-12-18 PROCEDURE — 87880 STREP A ASSAY W/OPTIC: CPT

## 2020-12-18 PROCEDURE — 71045 X-RAY EXAM CHEST 1 VIEW: CPT

## 2020-12-18 PROCEDURE — 99283 EMERGENCY DEPT VISIT LOW MDM: CPT

## 2020-12-18 ASSESSMENT — ENCOUNTER SYMPTOMS
SINUS PAIN: 0
SINUS PRESSURE: 0
ABDOMINAL PAIN: 0
VOMITING: 0
NAUSEA: 0
SHORTNESS OF BREATH: 1
COUGH: 1
SORE THROAT: 1
PHOTOPHOBIA: 0

## 2020-12-18 NOTE — ED TRIAGE NOTES
Pt reports sore throat and swelling x1 week. Pt was seen here on the 10th for similar symptoms and was told to quarantine for presumptive COVID 19 positive until he receives a test. Pt denies any n/v chest pain or SOB. Pt is alert and oriented x4.

## 2020-12-18 NOTE — ED PROVIDER NOTES
[address] [facility]     Emergency Department     Faculty Attestation        I performed a history and physical examination of the patient and discussed management with the resident. I reviewed the residents note and agree with the documented findings and plan of care. Any areas of disagreement are noted on the chart. I was personally present for the key portions of any procedures. I have documented in the chart those procedures where I was not present during the key portions. I have reviewed the emergency nurses triage note. I agree with the chief complaint, past medical history, past surgical history, allergies, medications, social and family history as documented unless otherwise noted below. For mid-level providers such as nurse practitioners as well as physicians assistants:    I have personally seen and evaluated the patient. I find the patient's history and physical exam are consistent with NP/PA documentation. I agree with the care provided, treatment rendered, disposition, & follow-up plan. Additional findings are as noted. Vital Signs: BP 98/62   Pulse 77   Temp 98.3 °F (36.8 °C)   Resp 14   Ht 5' 10\" (1.778 m)   Wt 145 lb (65.8 kg)   SpO2 100%   BMI 20.81 kg/m²   PCP:  Florin Gay MD    Pertinent Comments:       Patient recently diagnosed with Covid he planes of sore throat and dry nonproductive cough. Tonsils are erythematous there is no exudates. There is no swelling of his tongue, lips or uvula. Has normal voice. Handling secretions. Neck is soft and supple with no meningeal signs. Lungs clear to auscultation he sitting up watching TV no acute distress. No tachypnea hypoxia or signs of any respiratory distress.     Critical Care  None          Jovan Francis MD  Attending Emergency Medicine Physician              Cindy Green MD  12/18/20 0888

## 2020-12-18 NOTE — ED PROVIDER NOTES
[facility] ED  Emergency Department Encounter  EmergencyMedicine Resident     Pt Blair Rekhanoe Yelena Quick  MRN: [[id number]]  [place] [id number]  Date of evaluation: 20  PCP:  Prerna Noel MD    CHIEF COMPLAINT       Chief Complaint   Patient presents with    Pharyngitis       HISTORY OF PRESENT ILLNESS  (Location/Symptom, Timing/Onset, Context/Setting, Quality, Duration, Modifying Factors, Severity.)      Jennifer Pillai is a 26 y.o. male who presents with sore throat. Patient reports that he is recently diagnosed COVID-19 and over the past several days he has had sore throat and patient states when he looked in the mirror earlier today notes that the back of his throat was red appearing. Denies any fever, chills. Some generalized malaise and muscle aches. PAST MEDICAL / SURGICAL / SOCIAL / FAMILY HISTORY      has a past medical history of ADHD (attention deficit hyperactivity disorder), Allergic rhinitis, Allergy, Asthma, Depression, Headache(784.0), Scoliosis of lumbar spine, and Vision abnormalities. has a past surgical history that includes Tonsillectomy. Social History     Socioeconomic History    Marital status: Single     Spouse name: Not on file    Number of children: Not on file    Years of education: Not on file    Highest education level: Not on file   Occupational History    Not on file   Social Needs    Financial resource strain: Not on file    Food insecurity     Worry: Not on file     Inability: Not on file    Transportation needs     Medical: Not on file     Non-medical: Not on file   Tobacco Use    Smoking status: Former Smoker     Types: Cigarettes     Quit date: 1/3/2020     Years since quittin.9    Smokeless tobacco: Never Used   Substance and Sexual Activity    Alcohol use:  Yes     Alcohol/week: 1.0 standard drinks     Types: 1 Standard drinks or equivalent per week     Comment: socially    Drug use: No    Sexual activity: Yes Partners: Male   Lifestyle    Physical activity     Days per week: Not on file     Minutes per session: Not on file    Stress: Not on file   Relationships    Social connections     Talks on phone: Not on file     Gets together: Not on file     Attends Hoahaoism service: Not on file     Active member of club or organization: Not on file     Attends meetings of clubs or organizations: Not on file     Relationship status: Not on file    Intimate partner violence     Fear of current or ex partner: Not on file     Emotionally abused: Not on file     Physically abused: Not on file     Forced sexual activity: Not on file   Other Topics Concern    Not on file   Social History Narrative    Not on file       Family History   Problem Relation Age of Onset    Asthma Mother     High Blood Pressure Mother     Asthma Maternal Grandmother     Heart Disease Maternal Grandmother     High Blood Pressure Maternal Grandmother     Arthritis Neg Hx     Birth Defects Neg Hx     Cancer Neg Hx     Depression Neg Hx     Diabetes Neg Hx     Early Death Neg Hx     Hearing Loss Neg Hx     Kidney Disease Neg Hx     High Cholesterol Neg Hx     Learning Disabilities Neg Hx     Mental Illness Neg Hx     Mental Retardation Neg Hx     Miscarriages / Stillbirths Neg Hx     Stroke Neg Hx     Substance Abuse Neg Hx     Vision Loss Neg Hx        Allergies:  Patient has no known allergies. Home Medications:  Prior to Admission medications    Medication Sig Start Date End Date Taking?  Authorizing Provider   benzocaine-menthol (CEPACOL) 15-4 MG LOZG lozenge Take 1 lozenge by mouth every 2 hours as needed for Sore Throat 12/18/20  Yes Cindy Ko,    acetaminophen (TYLENOL) 325 MG tablet Take 2 tablets by mouth every 6 hours as needed for Pain 12/10/20 12/24/20  Kamilah Trujillo DO   ibuprofen (IBU) 400 MG tablet Take 1 tablet by mouth every 6 hours as needed for Pain 12/10/20 12/24/20  Kamilah Trujillo,    venlafaxine (EFFEXOR XR) 37.5 MG extended release capsule Take 1 capsule by mouth daily 9/22/20   Rona Magaña MD   butalbital-acetaminophen-caffeine (FIORICET, ESGIC) -40 MG per tablet Take 1 tablet by mouth every 4 hours as needed for Migraine 9/22/20   Rona Magaña MD   budesonide-formoterol (SYMBICORT) 80-4.5 MCG/ACT AERO Inhale 2 puffs into the lungs 2 times daily 9/22/20   Rona Magaña MD   albuterol sulfate HFA (VENTOLIN HFA) 108 (90 Base) MCG/ACT inhaler Inhale 2 puffs into the lungs 4 times daily as needed for Wheezing 9/22/20   Rona Magaña MD   mometasone (ELOCON) 0.1 % ointment Apply once daily as needed for flare of lupus rash 5/19/20   Nazario Franks MD   triamcinolone (KENALOG) 0.025 % ointment Apply topically 2 times daily to lupus rash 5/19/20   Nazario Franks MD   loratadine (CLARITIN) 10 MG tablet Take 1 tablet by mouth daily 5/14/19   Cristy Ochoa MD   triamcinolone (NASACORT) 55 MCG/ACT nasal inhaler 2 sprays by Nasal route daily 5/14/19   Cristy Ochoa MD   diphenhydrAMINE (BENADRYL) 25 MG tablet Take 1 tablet by mouth every 6 hours as needed for Itching or Allergies 8/11/18   Fabiola Sullivan MD   pantoprazole (PROTONIX) 20 MG tablet Take 1 tablet by mouth daily for 14 days 7/31/18 9/22/20  Jolanta Barahona MD   Lactobacillus (PROBIOTIC ACIDOPHILUS) TABS Take 1 tablet by mouth daily 4/12/18   Tomer Graham MD   fluticasone (FLONASE) 50 MCG/ACT nasal spray 1 spray by Nasal route daily 7/18/17   Rainer Weaver MD       REVIEW OF SYSTEMS    (2-9 systems for level 4, 10 or more for level 5)      Review of Systems   Constitutional: Positive for fatigue. Negative for fever. HENT: Positive for sore throat. Negative for sinus pressure and sinus pain. Eyes: Negative for photophobia and visual disturbance. Respiratory: Positive for cough and shortness of breath. Gastrointestinal: Negative for abdominal pain, nausea and vomiting. Endocrine: Negative for polyuria. Genitourinary: Negative for dysuria and flank pain. Musculoskeletal: Negative for neck pain and neck stiffness. Skin: Negative for rash and wound. Neurological: Negative for dizziness, light-headedness and headaches. Psychiatric/Behavioral: Negative for confusion. PHYSICAL EXAM   (up to 7 for level 4, 8 or more for level 5)      INITIAL VITALS:   BP 98/62   Pulse 77   Temp 98.3 °F (36.8 °C)   Resp 14   Ht 5' 10\" (1.778 m)   Wt 145 lb (65.8 kg)   SpO2 100%   BMI 20.81 kg/m²     Physical Exam  Constitutional:       General: He is not in acute distress. Appearance: He is not ill-appearing or toxic-appearing. HENT:      Head: Normocephalic and atraumatic. Nose: No congestion or rhinorrhea. Mouth/Throat:      Mouth: Mucous membranes are moist.      Pharynx: Oropharynx is clear. Posterior oropharyngeal erythema present. No pharyngeal swelling or oropharyngeal exudate. Comments: Tonsils surgically removed  Cardiovascular:      Rate and Rhythm: Normal rate. Heart sounds: No murmur. No friction rub. No gallop. Pulmonary:      Effort: No respiratory distress. Breath sounds: No wheezing, rhonchi or rales. Abdominal:      General: There is no distension. Tenderness: There is no abdominal tenderness. There is no guarding. Lymphadenopathy:      Cervical: No cervical adenopathy. Skin:     Coloration: Skin is not pale. Findings: No rash. Neurological:      Mental Status: He is alert.          DIFFERENTIAL  DIAGNOSIS     PLAN (LABS / IMAGING / EKG):  Orders Placed This Encounter   Procedures    Strep Screen Group A Throat    XR CHEST PORTABLE       MEDICATIONS ORDERED:  Orders Placed This Encounter   Medications    benzocaine-menthol (CEPACOL) 15-4 MG LOZG lozenge     Sig: Take 1 lozenge by mouth every 2 hours as needed for Sore Throat     Dispense:  30 lozenge     Refill:  0       DDX: Strep pharyngitis, viral pharyngitis, COVID-19, GERD    DIAGNOSTIC RESULTS / EMERGENCY DEPARTMENT COURSE / MDM   LAB RESULTS:  Results for orders placed or performed during the hospital encounter of 12/18/20   Strep Screen Group A Throat    Specimen: Throat   Result Value Ref Range    Specimen Description . THROAT     Special Requests NOT REPORTED     Direct Exam       Rapid Strep A negative. A negative Rapid Group A Strep Screen result does not rule out the possibility of Group A Streptococci in the specimen. A Group A Strep DNA test is available upon request.       IMPRESSION/ ED Course: 67-year-old male in no acute distress presenting with sore throat. Patient recent diagnosis of COVID-19. Strep swab ordered, negative results. Chest x-ray ordered as patient also complained of some intermittent shortness of breath. Chest  x-ray was unremarkable. Patient discharged home with Cepacol lozenges. Given strict ED return precautions and follow-up directions. He verbalized understanding of and agreement with the discharge    RADIOLOGY:  Xr Chest Portable    Result Date: 12/18/2020  EXAMINATION: ONE XRAY VIEW OF THE CHEST 12/18/2020 3:43 pm COMPARISON: 12/10/2020 HISTORY: ORDERING SYSTEM PROVIDED HISTORY: cough, shortness of breath TECHNOLOGIST PROVIDED HISTORY: cough, shortness of breath FINDINGS: No evidence of pneumonia, edema or other acute pulmonary process. No evidence of acute process of the cardiac or mediastinal structures. No evidence of pneumothorax or pleural effusion. No evidence of acute cardiopulmonary disease. PROCEDURES:  None    CONSULTS:  None    CRITICAL CARE:  Please see attending note    FINAL IMPRESSION      1.  Acute pharyngitis, unspecified etiology          DISPOSITION / PLAN     DISPOSITION Decision To Discharge 12/18/2020 03:52:25 PM      PATIENT REFERRED TO:  Prerna Noel MD  [address]  [phone]    Schedule an appointment as soon as possible for a visit in 3 days  For re-evaluation      DISCHARGE MEDICATIONS:  Discharge Medication List as of 12/18/2020  3:55 PM      START taking these medications    Details   benzocaine-menthol (CEPACOL) 15-4 MG LOZG lozenge Take 1 lozenge by mouth every 2 hours as needed for Sore Throat, Disp-30 lozenge, R-0Print             Jeniffer Wilhelm DO  Emergency Medicine Resident    (Please note that portions of thisnote were completed with a voice recognition program.  Efforts were made to edit the dictations but occasionally words are mis-transcribed.)        Jeniffer Wilhelm DO  Resident  12/18/20 [id number]

## 2020-12-18 NOTE — ED NOTES
Bed: 19  Expected date:   Expected time:   Means of arrival:   Comments:     Sai Khan, RN  12/18/20 [id number]

## 2021-08-07 ENCOUNTER — HOSPITAL ENCOUNTER (EMERGENCY)
Age: 26
Discharge: HOME | End: 2021-08-08
Attending: EMERGENCY MEDICINE
Payer: COMMERCIAL

## 2021-08-07 VITALS — RESPIRATION RATE: 16 BRPM | HEART RATE: 62 BPM | TEMPERATURE: 98.6 F | OXYGEN SATURATION: 100 %

## 2021-08-07 DIAGNOSIS — R10.9 ABDOMINAL PAIN, UNSPECIFIED ABDOMINAL LOCATION: Primary | ICD-10-CM

## 2021-08-07 LAB
ABSOLUTE EOS #: 0.17 K/UL (ref 0–0.44)
ABSOLUTE IMMATURE GRANULOCYTE: <0.03 K/UL (ref 0–0.3)
ABSOLUTE LYMPH #: 1.18 K/UL (ref 1.1–3.7)
ABSOLUTE MONO #: 0.43 K/UL (ref 0.1–1.2)
ALBUMIN SERPL-MCNC: 4.5 G/DL (ref 3.5–5.2)
ALBUMIN/GLOBULIN RATIO: 1.7 (ref 1–2.5)
ALP BLD-CCNC: 59 U/L (ref 40–129)
ALT SERPL-CCNC: 13 U/L (ref 5–41)
ANION GAP SERPL CALCULATED.3IONS-SCNC: 8 MMOL/L (ref 9–17)
AST SERPL-CCNC: 20 U/L
BASOPHILS # BLD: 1 % (ref 0–2)
BASOPHILS ABSOLUTE: 0.04 K/UL (ref 0–0.2)
BILIRUB SERPL-MCNC: 0.71 MG/DL (ref 0.3–1.2)
BUN BLDV-MCNC: 14 MG/DL (ref 6–20)
BUN/CREAT BLD: ABNORMAL (ref 9–20)
CALCIUM SERPL-MCNC: 9.1 MG/DL (ref 8.6–10.4)
CHLORIDE BLD-SCNC: 104 MMOL/L (ref 98–107)
CO2: 26 MMOL/L (ref 20–31)
CREAT SERPL-MCNC: 1.1 MG/DL (ref 0.7–1.2)
DIFFERENTIAL TYPE: ABNORMAL
EOSINOPHILS RELATIVE PERCENT: 5 % (ref 1–4)
GFR AFRICAN AMERICAN: >60 ML/MIN
GFR NON-AFRICAN AMERICAN: >60 ML/MIN
GFR SERPL CREATININE-BSD FRML MDRD: ABNORMAL ML/MIN/{1.73_M2}
GFR SERPL CREATININE-BSD FRML MDRD: ABNORMAL ML/MIN/{1.73_M2}
GLUCOSE BLD-MCNC: 88 MG/DL (ref 70–99)
HCT VFR BLD CALC: 42.3 % (ref 40.7–50.3)
HEMOGLOBIN: 13.6 G/DL (ref 13–17)
IMMATURE GRANULOCYTES: 0 %
LYMPHOCYTES # BLD: 35 % (ref 24–43)
MCH RBC QN AUTO: 30.3 PG (ref 25.2–33.5)
MCHC RBC AUTO-ENTMCNC: 32.2 G/DL (ref 28.4–34.8)
MCV RBC AUTO: 94.2 FL (ref 82.6–102.9)
MONOCYTES # BLD: 13 % (ref 3–12)
NRBC AUTOMATED: 0 PER 100 WBC
PDW BLD-RTO: 12.4 % (ref 11.8–14.4)
PLATELET # BLD: 256 K/UL (ref 138–453)
PLATELET ESTIMATE: ABNORMAL
PMV BLD AUTO: 9 FL (ref 8.1–13.5)
POTASSIUM SERPL-SCNC: 3.9 MMOL/L (ref 3.7–5.3)
PROCALCITONIN: 0.04 NG/ML
RBC # BLD: 4.49 M/UL (ref 4.21–5.77)
RBC # BLD: ABNORMAL 10*6/UL
SEG NEUTROPHILS: 46 % (ref 36–65)
SEGMENTED NEUTROPHILS ABSOLUTE COUNT: 1.6 K/UL (ref 1.5–8.1)
SODIUM BLD-SCNC: 138 MMOL/L (ref 135–144)
TOTAL PROTEIN: 7.1 G/DL (ref 6.4–8.3)
WBC # BLD: 3.4 K/UL (ref 3.5–11.3)
WBC # BLD: ABNORMAL 10*3/UL

## 2021-08-07 PROCEDURE — 85025 COMPLETE CBC W/AUTO DIFF WBC: CPT

## 2021-08-07 PROCEDURE — 96375 TX/PRO/DX INJ NEW DRUG ADDON: CPT

## 2021-08-07 PROCEDURE — 99284 EMERGENCY DEPT VISIT MOD MDM: CPT

## 2021-08-07 PROCEDURE — 2580000003 HC RX 258: Performed by: STUDENT IN AN ORGANIZED HEALTH CARE EDUCATION/TRAINING PROGRAM

## 2021-08-07 PROCEDURE — 84145 PROCALCITONIN (PCT): CPT

## 2021-08-07 PROCEDURE — 87536 HIV-1 QUANT&REVRSE TRNSCRPJ: CPT

## 2021-08-07 PROCEDURE — 86361 T CELL ABSOLUTE COUNT: CPT

## 2021-08-07 PROCEDURE — 96374 THER/PROPH/DIAG INJ IV PUSH: CPT

## 2021-08-07 PROCEDURE — 83605 ASSAY OF LACTIC ACID: CPT

## 2021-08-07 PROCEDURE — 80053 COMPREHEN METABOLIC PANEL: CPT

## 2021-08-07 PROCEDURE — 6360000002 HC RX W HCPCS: Performed by: STUDENT IN AN ORGANIZED HEALTH CARE EDUCATION/TRAINING PROGRAM

## 2021-08-07 RX ORDER — 0.9 % SODIUM CHLORIDE 0.9 %
1000 INTRAVENOUS SOLUTION INTRAVENOUS ONCE
Status: COMPLETED | OUTPATIENT
Start: 2021-08-07 | End: 2021-08-08

## 2021-08-07 RX ORDER — NITROGLYCERIN 20 MG/100ML
5-200 INJECTION INTRAVENOUS CONTINUOUS
Status: DISCONTINUED | OUTPATIENT
Start: 2021-08-07 | End: 2021-08-07

## 2021-08-07 RX ORDER — KETOROLAC TROMETHAMINE 30 MG/ML
30 INJECTION, SOLUTION INTRAMUSCULAR; INTRAVENOUS ONCE
Status: COMPLETED | OUTPATIENT
Start: 2021-08-07 | End: 2021-08-07

## 2021-08-07 RX ORDER — ONDANSETRON 2 MG/ML
4 INJECTION INTRAMUSCULAR; INTRAVENOUS ONCE
Status: COMPLETED | OUTPATIENT
Start: 2021-08-07 | End: 2021-08-07

## 2021-08-07 RX ADMIN — SODIUM CHLORIDE 1000 ML: 9 INJECTION, SOLUTION INTRAVENOUS at 23:36

## 2021-08-07 RX ADMIN — KETOROLAC TROMETHAMINE 30 MG: 30 INJECTION, SOLUTION INTRAMUSCULAR; INTRAVENOUS at 23:36

## 2021-08-07 RX ADMIN — ONDANSETRON 4 MG: 2 INJECTION INTRAMUSCULAR; INTRAVENOUS at 23:36

## 2021-08-07 ASSESSMENT — PAIN DESCRIPTION - ORIENTATION: ORIENTATION: RIGHT;LEFT;LOWER;UPPER

## 2021-08-07 ASSESSMENT — PAIN DESCRIPTION - FREQUENCY: FREQUENCY: INTERMITTENT

## 2021-08-07 ASSESSMENT — PAIN SCALES - GENERAL
PAINLEVEL_OUTOF10: 7
PAINLEVEL_OUTOF10: 8

## 2021-08-07 ASSESSMENT — PAIN DESCRIPTION - PAIN TYPE: TYPE: ACUTE PAIN

## 2021-08-07 ASSESSMENT — PAIN DESCRIPTION - LOCATION: LOCATION: ABDOMEN

## 2021-08-07 ASSESSMENT — PAIN DESCRIPTION - DESCRIPTORS: DESCRIPTORS: ACHING;SHARP

## 2021-08-08 LAB
LACTIC ACID, WHOLE BLOOD: 1.6 MMOL/L (ref 0.7–2.1)
LACTIC ACID: NORMAL MMOL/L

## 2021-08-08 NOTE — ED PROVIDER NOTES
[facility] [facility]     Emergency Department     Faculty Attestation        I performed a history and physical examination of the patient and discussed management with the resident. I reviewed the residents note and agree with the documented findings and plan of care. Any areas of disagreement are noted on the chart. I was personally present for the key portions of any procedures. I have documented in the chart those procedures where I was not present during the key portions. I have reviewed the emergency nurses triage note. I agree with the chief complaint, past medical history, past surgical history, allergies, medications, social and family history as documented unless otherwise noted below. For Physician Assistant/ Nurse Practitioner cases/documentation I have personally evaluated this patient and have completed at least one if not all key elements of the E/M (history, physical exam, and MDM). Additional findings are as noted. Vital Signs:    Pulse: 62  Resp: 16  Temp: 98.6 °F (37 °C) SpO2: 100 %  PCP:  Jolie Dale MD    Pertinent Comments:     Patient is a 62-year-old male with history of asthma as well as HIV currently under treatment at [facility]. Patient states over the last 2 to 3 days has been having occasional nausea vomiting or diarrhea and abdominal cramping diffusely. Has been having some lightheadedness upon standing occasionally as well. Denies chest pain or shortness of breath however. No fevers per patient either. Abdomen is soft and well states there is pain \"all over\" has no peritoneal signs whatsoever and specifically no McBurney's point or Bryant sign at all. Assessment/plan: Patient with diarrhea and HIV. Will treat symptomatically and hydrate as well with basic laboratories being checked and reevaluation after.       Critical Care  None    This patient was evaluated in the Emergency Department for symptoms described in the

## 2021-08-08 NOTE — ED PROVIDER NOTES
[facility] ED  Emergency Department Encounter  EmergencyMedicine Resident     Pt Oni Lemus  MRN: [[id number]]  [place] 1995  Date of evaluation: 22  PCP:  Dillon Pierce MD    [address] [place] [address]       Chief Complaint   Patient presents with    Dizziness    Abdominal Pain       HISTORY OF PRESENT ILLNESS  (Location/Symptom, Timing/Onset, Context/Setting, Quality, Duration, Modifying Factors, Severity.)      Kaden Sidhu is a 23 y.o. male who presents with chief complaint 72 hours generalized malaise nausea vomiting diarrhea diffuse abdominal pain 8/10 not localizable to any specific area. Patient states he has HIV, on antiretroviral.  Last CD4 count was undetectable, however he may be referring to viral load patient is unsure. Denying chest pain shortness of breath. States that is eating drinking normally. Denying headache. Patient states he is dizzy, describing presyncopal symptoms and generalized weakness. Not on any other medications besides his antiviral.  No other significant medical history. No sick contacts. PAST MEDICAL / SURGICAL / SOCIAL / FAMILY HISTORY      has a past medical history of ADHD (attention deficit hyperactivity disorder), Allergic rhinitis, Allergy, Asthma, Depression, Headache(784.0), Scoliosis of lumbar spine, and Vision abnormalities. has a past surgical history that includes Tonsillectomy. Social History     Socioeconomic History    Marital status: Single     Spouse name: Not on file    Number of children: Not on file    Years of education: Not on file    Highest education level: Not on file   Occupational History    Not on file   Tobacco Use    Smoking status: Former Smoker     Types: Cigarettes     Quit date: 1/3/2020     Years since quittin.5    Smokeless tobacco: Never Used   Vaping Use    Vaping Use: Never used   Substance and Sexual Activity    Alcohol use:  Yes     Alcohol/week: 1.0 standard drinks     Types: 1 Standard drinks or equivalent per week     Comment: socially    Drug use: No    Sexual activity: Yes     Partners: Male   Other Topics Concern    Not on file   Social History Narrative    Not on file     Social Determinants of Health     Financial Resource Strain:     Difficulty of Paying Living Expenses:    Food Insecurity:     Worried About Running Out of Food in the Last Year:     [address] in the Last Year:    Transportation Needs:     Lack of Transportation (Medical):  Lack of Transportation (Non-Medical):    Physical Activity:     Days of Exercise per Week:     Minutes of Exercise per Session:    Stress:     Feeling of Stress :    Social Connections:     Frequency of Communication with Friends and Family:     Frequency of Social Gatherings with Friends and Family:     Attends Congregational Services:     Active Member of Clubs or Organizations:     Attends Club or Organization Meetings:     Marital Status:    Intimate Partner Violence:     Fear of Current or Ex-Partner:     Emotionally Abused:     Physically Abused:     Sexually Abused:        Family History   Problem Relation Age of Onset    Asthma Mother     High Blood Pressure Mother     Asthma Maternal Grandmother     Heart Disease Maternal Grandmother     High Blood Pressure Maternal Grandmother     Arthritis Neg Hx     Birth Defects Neg Hx     Cancer Neg Hx     Depression Neg Hx     Diabetes Neg Hx     Early Death Neg Hx     Hearing Loss Neg Hx     Kidney Disease Neg Hx     High Cholesterol Neg Hx     Learning Disabilities Neg Hx     Mental Illness Neg Hx     Mental Retardation Neg Hx     Miscarriages / Stillbirths Neg Hx     Stroke Neg Hx     Substance Abuse Neg Hx     Vision Loss Neg Hx        Allergies:  Patient has no known allergies. Home Medications:  Prior to Admission medications    Medication Sig Start Date End Date Taking?  Authorizing Provider   benzocaine-menthol (CEPACOL) 15-4 MG LOZG lozenge Take 1 lozenge by mouth every 2 hours as needed for Sore Throat 12/18/20   Celio Wiseman DO   acetaminophen (TYLENOL) 325 MG tablet Take 2 tablets by mouth every 6 hours as needed for Pain 12/10/20 12/24/20  Daina Khalil DO   ibuprofen (IBU) 400 MG tablet Take 1 tablet by mouth every 6 hours as needed for Pain 12/10/20 12/24/20  Daina Khalil DO   venlafaxine (EFFEXOR XR) 37.5 MG extended release capsule Take 1 capsule by mouth daily 9/22/20   Jeovany Franklin MD   butalbital-acetaminophen-caffeine (FIORICET, ESGIC) -40 MG per tablet Take 1 tablet by mouth every 4 hours as needed for Migraine 9/22/20   Jeovany Franklin MD   budesonide-formoterol (SYMBICORT) 80-4.5 MCG/ACT AERO Inhale 2 puffs into the lungs 2 times daily 9/22/20   Jeovany Franklin MD   albuterol sulfate HFA (VENTOLIN HFA) 108 (90 Base) MCG/ACT inhaler Inhale 2 puffs into the lungs 4 times daily as needed for Wheezing 9/22/20   Jeovany Franklin MD   mometasone (ELOCON) 0.1 % ointment Apply once daily as needed for flare of lupus rash 5/19/20   Yudith Franks MD   triamcinolone (KENALOG) 0.025 % ointment Apply topically 2 times daily to lupus rash 5/19/20   Yudith Franks MD   loratadine (CLARITIN) 10 MG tablet Take 1 tablet by mouth daily 5/14/19   Brian Danielson MD   triamcinolone (NASACORT) 55 MCG/ACT nasal inhaler 2 sprays by Nasal route daily 5/14/19   Brian Danielson MD   diphenhydrAMINE (BENADRYL) 25 MG tablet Take 1 tablet by mouth every 6 hours as needed for Itching or Allergies 8/11/18   Jean Vega MD   pantoprazole (PROTONIX) 20 MG tablet Take 1 tablet by mouth daily for 14 days 7/31/18 9/22/20  Anna Cobb MD   Lactobacillus (PROBIOTIC ACIDOPHILUS) TABS Take 1 tablet by mouth daily 4/12/18   Toshia Heart MD   fluticasone (FLONASE) 50 MCG/ACT nasal spray 1 spray by Nasal route daily 7/18/17   Rainer Cervantes MD       REVIEW OF SYSTEMS    (2-9 systems for level 4, 10 or more for level 5)      Review of Systems Constitutional: Negative for fever. HENT: Negative for congestion. Eyes: Negative for photophobia. Respiratory: Negative for shortness of breath. Cardiovascular: Negative for chest pain. Gastrointestinal: Positive for abdominal pain and vomiting. Endocrine: Negative for polyuria. Genitourinary: Negative for dysuria. Musculoskeletal: Negative for arthralgias. Skin: Negative for color change. Allergic/Immunologic: Negative for immunocompromised state. Neurological: Negative for dizziness. Hematological: Does not bruise/bleed easily. Psychiatric/Behavioral: Negative for agitation. PHYSICAL EXAM   (up to 7 for level 4, 8 or more for level 5)      INITIAL VITALS:   Pulse 62   Temp 98.6 °F (37 °C) (Oral)   Resp 16   SpO2 100%     Physical Exam  Constitutional:       General: Not in acute distress. Appearance: Normal appearance. Normal weight. Not toxic-appearing. HENT:      Head: Normocephalic and atraumatic. Nose: Nose normal.      Mouth/Throat: Mucous membranes are moist.  Uvula midline no oropharyngeal edema. Pharynx: Oropharynx is clear. Eyes:      Extraocular Movements: Extraocular movements intact. Conjunctiva/sclera: Conjunctivae normal.      Pupils: Pupils are equal, round, and reactive to light. Neck:      Musculoskeletal: Normal range of motion and neck supple. No neck rigidity. Cardiovascular:      Rate and Rhythm: Normal rate and regular rhythm. Pulses: Normal pulses. Heart sounds: Normal heart sounds. No murmur. Pulmonary:      Effort: Pulmonary effort is normal.      Breath sounds: Normal breath sounds. No wheezing. Abdominal:      General: Abdomen is flat. Bowel sounds are normal.      Tenderness:  diffuse abdominal tenderness to palpation without guarding rebound or rigidity    Musculoskeletal:     Normal range of motion. General: No swelling or tenderness. No LE edema    Skin:     General: Skin is warm. Capillary Refill: Capillary refill takes less than 2 seconds. Coloration: Skin is not jaundiced. Neurological:      General: No focal deficit present. Mental Status: Alert and oriented to person, place, and time. Mental status is at baseline. Motor: No weakness.        DIFFERENTIAL  DIAGNOSIS     PLAN (LABS / IMAGING / EKG):  Orders Placed This Encounter   Procedures    O&P PANEL ([employer]IATED) #1    Gastrointestinal Panel, Molecular    CBC Auto Differential    Comprehensive Metabolic Panel w/ Reflex to MG    T-helper cells (CD4) count    Lactic Acid, Plasma    Procalcitonin    HIV RNA, QUANTITATIVE, PCR    Insert peripheral IV       MEDICATIONS ORDERED:  Orders Placed This Encounter   Medications    0.9 % sodium chloride bolus    ketorolac (TORADOL) injection 30 mg    ondansetron (ZOFRAN) injection 4 mg    DISCONTD: nitroGLYCERIN 50 mg in dextrose 5% 250 mL infusion           DIAGNOSTIC RESULTS / EMERGENCY DEPARTMENT COURSE / MDM     LABS:  Results for orders placed or performed during the hospital encounter of 08/07/21   CBC Auto Differential   Result Value Ref Range    WBC 3.4 (L) 3.5 - 11.3 k/uL    RBC 4.49 4.21 - 5.77 m/uL    Hemoglobin 13.6 13.0 - 17.0 g/dL    Hematocrit 42.3 40.7 - 50.3 %    MCV 94.2 82.6 - 102.9 fL    MCH 30.3 25.2 - 33.5 pg    MCHC 32.2 28.4 - 34.8 g/dL    RDW 12.4 11.8 - 14.4 %    Platelets 999 091 - 332 k/uL    MPV 9.0 8.1 - 13.5 fL    NRBC Automated 0.0 0.0 per 100 WBC    Differential Type NOT REPORTED     Seg Neutrophils 46 36 - 65 %    Lymphocytes 35 24 - 43 %    Monocytes 13 (H) 3 - 12 %    Eosinophils % 5 (H) 1 - 4 %    Basophils 1 0 - 2 %    Immature Granulocytes 0 0 %    Segs Absolute 1.60 1.50 - 8.10 k/uL    Absolute Lymph # 1.18 1.10 - 3.70 k/uL    Absolute Mono # 0.43 0.10 - 1.20 k/uL    Absolute Eos # 0.17 0.00 - 0.44 k/uL    Basophils Absolute 0.04 0.00 - 0.20 k/uL    Absolute Immature Granulocyte <0.03 0.00 - 0.30 k/uL    WBC Morphology NOT REPORTED     RBC Morphology NOT REPORTED     Platelet Estimate NOT REPORTED    Comprehensive Metabolic Panel w/ Reflex to MG   Result Value Ref Range    Glucose 88 70 - 99 mg/dL    BUN 14 6 - 20 mg/dL    CREATININE 1.10 0.70 - 1.20 mg/dL    Bun/Cre Ratio NOT REPORTED 9 - 20    Calcium 9.1 8.6 - 10.4 mg/dL    Sodium 138 135 - 144 mmol/L    Potassium 3.9 3.7 - 5.3 mmol/L    Chloride 104 98 - 107 mmol/L    CO2 26 20 - 31 mmol/L    Anion Gap 8 (L) 9 - 17 mmol/L    Alkaline Phosphatase 59 40 - 129 U/L    ALT 13 5 - 41 U/L    AST 20 <40 U/L    Total Bilirubin 0.71 0.3 - 1.2 mg/dL    Total Protein 7.1 6.4 - 8.3 g/dL    Albumin 4.5 3.5 - 5.2 g/dL    Albumin/Globulin Ratio 1.7 1.0 - 2.5    GFR Non-African American >60 >60 mL/min    GFR African American >60 >60 mL/min    GFR Comment          GFR Staging NOT REPORTED    Lactic Acid, Plasma   Result Value Ref Range    Lactic Acid NOT REPORTED mmol/L    Lactic Acid, Whole Blood 1.6 0.7 - 2.1 mmol/L   Procalcitonin   Result Value Ref Range    Procalcitonin 0.04 <0.09 ng/mL         RADIOLOGY:  None    EKG  None    All EKG's are interpreted by the Emergency Department Physician who either signs or Co-signs this chart in the absence of a cardiologist.    EMERGENCY DEPARTMENT COURSE:  Patient breathing quietly and unlabored on room air. Speech is normal and speaking in full sentences without requiring to pause to take a breath. Patient appears well vital signs stable afebrile. Patient ambulatory with a normal gait. There is some mild tenderness to palpation of the abdomen without any guarding rebound or rigidity. On chart review patient has a positive medical history. He states that he is HIV positive but does not know the details. Stated that he has an appointment with his HIV doctor on Monday. While here in the emergency department we will do CBC CMP IV fluids Toradol Zofran. Will also get CD4 count HIV quant and stool studies, if able.   Concern for electrolyte derangement opportunistic infection dehydration    WBC 3.4 however absolute counts are in normal limits. Patient was unable to provide stool sample. Symptoms slightly improved. H IV quadrant, CD4 count still pending. Patient has appointment on Monday where he will follow up with his HIV specialist.    PROCEDURES:  None    CONSULTS:  None    CRITICAL CARE:  None    FINAL IMPRESSION      1.  Abdominal pain, unspecified abdominal location          DISPOSITION / PLAN     DISPOSITION Decision To Discharge 08/08/2021 01:49:03 AM      PATIENT REFERRED TO:  Ella Kramer MD  [facility] [address] [phone]    Schedule an appointment as soon as possible for a visit in 2 days        DISCHARGE MEDICATIONS:  Discharge Medication List as of 8/8/2021  2:12 AM          Lizbeth Macario MD  Emergency Medicine Resident    (Please note that portions of thisnote were completed with a voice recognition program.  Efforts were made to edit the dictations but occasionally words are mis-transcribed.)       Lizbeth Macario MD  Resident  08/08/21 [id number]

## 2021-08-09 LAB
ABSOLUTE CD 4 HELPER: 417 /UL (ref 309–1571)
CD4 % HELPER T CELL: 35 % (ref 27–64)
LYMPHOCYTES # BLD: 35 % (ref 24–44)
WBC # BLD: 3.4 K/UL (ref 3.5–11)

## 2021-08-15 LAB
DIRECT EXAM: NORMAL
DIRECT EXAM: NORMAL
Lab: NORMAL
SPECIMEN DESCRIPTION: NORMAL

## 2021-10-25 ENCOUNTER — OFFICE VISIT (OUTPATIENT)
Dept: PRIMARY CARE CLINIC | Age: 26
End: 2021-10-25
Payer: COMMERCIAL

## 2021-10-25 VITALS — OXYGEN SATURATION: 99 % | SYSTOLIC BLOOD PRESSURE: 117 MMHG | DIASTOLIC BLOOD PRESSURE: 80 MMHG | HEART RATE: 59 BPM

## 2021-10-25 DIAGNOSIS — R51.9 NONINTRACTABLE EPISODIC HEADACHE, UNSPECIFIED HEADACHE TYPE: Primary | ICD-10-CM

## 2021-10-25 PROCEDURE — G8484 FLU IMMUNIZE NO ADMIN: HCPCS | Performed by: INTERNAL MEDICINE

## 2021-10-25 PROCEDURE — 1036F TOBACCO NON-USER: CPT | Performed by: INTERNAL MEDICINE

## 2021-10-25 PROCEDURE — 99213 OFFICE O/P EST LOW 20 MIN: CPT | Performed by: INTERNAL MEDICINE

## 2021-10-25 PROCEDURE — G8420 CALC BMI NORM PARAMETERS: HCPCS | Performed by: INTERNAL MEDICINE

## 2021-10-25 PROCEDURE — G8427 DOCREV CUR MEDS BY ELIG CLIN: HCPCS | Performed by: INTERNAL MEDICINE

## 2021-10-25 RX ORDER — BICTEGRAVIR SODIUM, EMTRICITABINE, AND TENOFOVIR ALAFENAMIDE FUMARATE 50; 200; 25 MG/1; MG/1; MG/1
TABLET ORAL
COMMUNITY

## 2021-10-25 RX ORDER — BUTALBITAL, ACETAMINOPHEN AND CAFFEINE 50; 325; 40 MG/1; MG/1; MG/1
1 TABLET ORAL EVERY 8 HOURS PRN
Qty: 30 TABLET | Refills: 0 | Status: SHIPPED | OUTPATIENT
Start: 2021-10-25

## 2021-10-25 NOTE — PROGRESS NOTES
[facility]  [facility] [place] [facility]  [address] [address]  Dept: [phone]  Dept Fax: [phone]    Vitaliy Mohan is a 23 y.o. male who presents to the urgent care today for his medicalconditions/complaints as noted below. Vitaliy Mohan is c/o of Hypertension (checked yesterday 140/something) and Headache (a week)      HPI:     Hypertension  This is a new (BP is normal.) problem. The current episode started yesterday. Associated symptoms include headaches. There are no associated agents to hypertension. There are no known risk factors for coronary artery disease. Past treatments include nothing. The current treatment provides no improvement. Headache   This is a recurrent problem. The current episode started 1 to 4 weeks ago. The problem has been waxing and waning. The pain is located in the frontal region. The quality of the pain is described as shooting (like being punched in the head). He has tried NSAIDs for the symptoms. The treatment provided mild relief. Last night BP was 130 over something--can't remember second number.     Past Medical History:   Diagnosis Date    ADHD (attention deficit hyperactivity disorder)      and depressive disorder-[place]    Allergic rhinitis     Allergy     seasonal    Asthma     Depression     Headache(784.0)     Scoliosis of lumbar spine 10/1/2012    Vision abnormalities         Current Outpatient Medications   Medication Sig Dispense Refill    bictegravir-emtricitab-tenofovir alafenamide (BIKTARVY) -25 MG TABS per tablet Biktarvy 50 mg-200 mg-25 mg tablet   TAKE ONE TABLET BY MOUTH EVERY DAY      butalbital-acetaminophen-caffeine (FIORICET, ESGIC) -40 MG per tablet Take 1 tablet by mouth every 8 hours as needed for Headaches 30 tablet 0    benzocaine-menthol (CEPACOL) 15-4 MG LOZG lozenge Take 1 lozenge by mouth every 2 hours as needed for Sore Throat (Patient not taking: Reported on 10/25/2021) 30 lozenge 0  acetaminophen (TYLENOL) 325 MG tablet Take 2 tablets by mouth every 6 hours as needed for Pain 112 tablet 0    ibuprofen (IBU) 400 MG tablet Take 1 tablet by mouth every 6 hours as needed for Pain 56 tablet 0    venlafaxine (EFFEXOR XR) 37.5 MG extended release capsule Take 1 capsule by mouth daily (Patient not taking: Reported on 10/25/2021) 30 capsule 2    butalbital-acetaminophen-caffeine (FIORICET, ESGIC) -40 MG per tablet Take 1 tablet by mouth every 4 hours as needed for Migraine (Patient not taking: Reported on 10/25/2021) 180 tablet 3    budesonide-formoterol (SYMBICORT) 80-4.5 MCG/ACT AERO Inhale 2 puffs into the lungs 2 times daily (Patient not taking: Reported on 10/25/2021) 1 Inhaler 3    albuterol sulfate HFA (VENTOLIN HFA) 108 (90 Base) MCG/ACT inhaler Inhale 2 puffs into the lungs 4 times daily as needed for Wheezing (Patient not taking: Reported on 10/25/2021) 1 Inhaler 5    mometasone (ELOCON) 0.1 % ointment Apply once daily as needed for flare of lupus rash (Patient not taking: Reported on 10/25/2021) 15 g 2    triamcinolone (KENALOG) 0.025 % ointment Apply topically 2 times daily to lupus rash (Patient not taking: Reported on 10/25/2021) 80 g 2    loratadine (CLARITIN) 10 MG tablet Take 1 tablet by mouth daily (Patient not taking: Reported on 10/25/2021) 30 tablet 1    triamcinolone (NASACORT) 55 MCG/ACT nasal inhaler 2 sprays by Nasal route daily (Patient not taking: Reported on 10/25/2021) 1 Inhaler 1    diphenhydrAMINE (BENADRYL) 25 MG tablet Take 1 tablet by mouth every 6 hours as needed for Itching or Allergies (Patient not taking: Reported on 10/25/2021) 30 tablet 0    pantoprazole (PROTONIX) 20 MG tablet Take 1 tablet by mouth daily for 14 days 14 tablet 0    Lactobacillus (PROBIOTIC ACIDOPHILUS) TABS Take 1 tablet by mouth daily (Patient not taking: Reported on 10/25/2021) 30 tablet 0    fluticasone (FLONASE) 50 MCG/ACT nasal spray 1 spray by Nasal route daily (Patient not taking: Reported on 10/25/2021) 1 Bottle 3     No current facility-administered medications for this visit. No Known Allergies    Health Maintenance   Topic Date Due    Flu vaccine (1) 09/01/2021    DTaP/Tdap/Td vaccine (8 - Td or Tdap) 03/14/2029    Pneumococcal 0-64 years Vaccine (2 of 2 - PPSV23) 06/27/2061    Hepatitis A vaccine  Completed    Hepatitis B vaccine  Completed    Hib vaccine  Completed    HPV vaccine  Completed    Varicella vaccine  Completed    COVID-19 Vaccine  Completed    Hepatitis C screen  Completed    HIV screen  Completed    Meningococcal (ACWY) vaccine  Aged Out       Subjective:      Review of Systems   Neurological: Positive for headaches. All other systems reviewed and are negative. Objective:     Physical Exam  Vitals reviewed. Constitutional:       Appearance: Normal appearance. HENT:      Head: Normocephalic and atraumatic. Eyes:      Comments: Eyes are constricted. ?ADHD medication. Fundi are normal.   Cardiovascular:      Rate and Rhythm: Normal rate and regular rhythm. Pulmonary:      Effort: Pulmonary effort is normal.      Breath sounds: Normal breath sounds. Skin:     General: Skin is warm and dry. Neurological:      General: No focal deficit present. Mental Status: He is alert and oriented to person, place, and time. Psychiatric:         Mood and Affect: Mood normal.         Behavior: Behavior normal.       /80   Pulse 59   SpO2 99%       Assessment:       Diagnosis Orders   1. Nonintractable episodic headache, unspecified headache type  butalbital-acetaminophen-caffeine (FIORICET, ESGIC) -40 MG per tablet       Plan:      No follow-ups on file.     Orders Placed This Encounter   Medications    butalbital-acetaminophen-caffeine (FIORICET, ESGIC) -40 MG per tablet     Sig: Take 1 tablet by mouth every 8 hours as needed for Headaches     Dispense:  30 tablet     Refill:  0     No orders of the defined

## 2021-12-28 ENCOUNTER — OFFICE VISIT (OUTPATIENT)
Dept: PRIMARY CARE CLINIC | Age: 26
End: 2021-12-28
Payer: COMMERCIAL

## 2021-12-28 ENCOUNTER — HOSPITAL ENCOUNTER (OUTPATIENT)
Age: 26
Setting detail: SPECIMEN
Discharge: HOME | End: 2021-12-28

## 2021-12-28 VITALS
OXYGEN SATURATION: 98 % | HEART RATE: 84 BPM | TEMPERATURE: 101.3 F | DIASTOLIC BLOOD PRESSURE: 75 MMHG | SYSTOLIC BLOOD PRESSURE: 124 MMHG

## 2021-12-28 DIAGNOSIS — J45.20 MILD INTERMITTENT ASTHMA WITHOUT COMPLICATION: ICD-10-CM

## 2021-12-28 DIAGNOSIS — J30.1 SEASONAL ALLERGIC RHINITIS DUE TO POLLEN: ICD-10-CM

## 2021-12-28 DIAGNOSIS — J06.9 VIRAL URI WITH COUGH: Primary | ICD-10-CM

## 2021-12-28 PROCEDURE — G8421 BMI NOT CALCULATED: HCPCS | Performed by: NURSE PRACTITIONER

## 2021-12-28 PROCEDURE — 99213 OFFICE O/P EST LOW 20 MIN: CPT | Performed by: NURSE PRACTITIONER

## 2021-12-28 PROCEDURE — G8484 FLU IMMUNIZE NO ADMIN: HCPCS | Performed by: NURSE PRACTITIONER

## 2021-12-28 PROCEDURE — G8427 DOCREV CUR MEDS BY ELIG CLIN: HCPCS | Performed by: NURSE PRACTITIONER

## 2021-12-28 PROCEDURE — 1036F TOBACCO NON-USER: CPT | Performed by: NURSE PRACTITIONER

## 2021-12-28 RX ORDER — PREDNISONE 20 MG/1
20 TABLET ORAL 2 TIMES DAILY
Qty: 10 TABLET | Refills: 0 | Status: SHIPPED | OUTPATIENT
Start: 2021-12-28 | End: 2022-01-02

## 2021-12-28 RX ORDER — FLUTICASONE PROPIONATE 50 MCG
1 SPRAY, SUSPENSION (ML) NASAL DAILY
Qty: 1 EACH | Refills: 1 | Status: SHIPPED | OUTPATIENT
Start: 2021-12-28

## 2021-12-28 RX ORDER — BUDESONIDE AND FORMOTEROL FUMARATE DIHYDRATE 80; 4.5 UG/1; UG/1
2 AEROSOL RESPIRATORY (INHALATION) 2 TIMES DAILY
Qty: 1 EACH | Refills: 1 | Status: SHIPPED | OUTPATIENT
Start: 2021-12-28

## 2021-12-28 RX ORDER — ALBUTEROL SULFATE 90 UG/1
2 AEROSOL, METERED RESPIRATORY (INHALATION) 4 TIMES DAILY PRN
Qty: 1 EACH | Refills: 1 | Status: SHIPPED | OUTPATIENT
Start: 2021-12-28 | End: 2022-01-27

## 2021-12-28 ASSESSMENT — ENCOUNTER SYMPTOMS
SWOLLEN GLANDS: 0
NAUSEA: 1
ABDOMINAL PAIN: 0
WHEEZING: 1
VOMITING: 1
SINUS PAIN: 0
COUGH: 1
SORE THROAT: 1
DIARRHEA: 0
RHINORRHEA: 1

## 2021-12-28 NOTE — PATIENT INSTRUCTIONS
Follow up with family doctor in 1 week as needed. Return immediately if worse, new symptoms develop, symptoms persist or have any questions or concerns. Push fluids, keep hydrated  Cool mist humidifier bedside  Continue all medications as prescribed  May alternate tylenol/motrin over the counter for pain or fever, take per package instructions. The COVID-19 test that was done today can take 1-6 days for results. Until then you should assume you have this disease and adhere to home isolation as described below. When we get the test results back, one of the following readings will be obtained. 1. A positive test means you have the virus. 2.  An inconclusive test means it wasn't sure if you have the virus or not. An inconclusive test result is treated as a positive result and recommendations  are the same as a positive test result. We may ask you to repeat this test in this circumstance. 3.  A negative test means you probably do not have the virus, but it is not conclusive. If your results of the COVID-19 test is POSITIVE- you must isolate yourself from others. You can be around others after:    10 days since symptoms first appeared (immunocompromised will quarantine for 20 days) and  24 hours with no fever without the use of fever-reducing medications and  Other symptoms of COVID-19 are improving*  *Loss of taste and smell may persist for weeks or months after recovery and need not delay the end of isolation  For people who are immunocopromised, quarantine may last for 20 days. Most people do not require testing to decide when they can be around others; however, if your healthcare provider recommends testing, they will let you know when you can resume being around others based on your test results. Note that these recommendations do not apply to persons with severe COVID-19 or with severely weakened immune systems (immunocompromised).  These persons should follow the guidance below for I was severely ill with COVID-19 or have a severely weakened immune system (immunocompromised) due to a health condition or medication. When can I be around others?     CDC does NOT recommend retesting to determine if the infection has resolved, when to end home isolation or whether to discontinue precautions in a healthcare setting. Your test can remain positive for 90 days, but that does not mean you are contagious. Some patients who are immunocompromised or had severe illness are sometimes retested in consultation with infectious disease experts. [employer]Exchange[employer]. html    Prevention steps for People with positive or inconclusive test results or suspected  COVID-19 (including persons under investigation) who do not need to be hospitalized  and   People with confirmed COVID-19 who were hospitalized and determined to be medically stable to go home    Contacts who are NOT healthcare providers or first responders and are asymptomatic (no fever,  cough, shortness of breath, or difficulty breathing) should self-quarantine for 14 days from the last  date of exposure to confirmed COVID-19. Your healthcare provider and public health staff will evaluate whether you can be cared for at home. If it is determined that you do not need to be hospitalized and can be isolated at home, you will be monitored by staff from your health department. You should follow the prevention steps below until a healthcare provider or local or state health department says you can return to your normal activities. Stay home except to get medical care  People who are mildly ill with COVID-19 are able to isolate at home during their illness. You should restrict activities outside your home, except for getting medical care. Do not go to work, school, or public areas. Avoid using public transportation, ride-sharing, or taxis.   Separate yourself from other people and animals in your home  People: As much as possible, you should stay in a specific room and away from other people in your home. Also, you should use a separate bathroom, if available. Animals: You should restrict contact with pets and other animals while you are sick with COVID-19, just like you would around other people. When possible, have another member of your household care for your animals while you are sick. If you are sick with COVID-19, avoid contact with your pet, including petting, snuggling, being kissed or licked, and sharing food. If you must care for your pet or be around animals while you are sick, wash your hands before and after you interact with pets and wear a facemask. Call ahead before visiting your doctor  If you have a medical appointment, call the healthcare provider and tell them that you have or may have COVID-19. This will help the healthcare providers office take steps to keep other people from getting infected or exposed. Wear a facemask  You should wear a facemask when you are around other people (e.g., sharing a room or vehicle) or pets and before you enter a healthcare providers office. If you are not able to wear a facemask (for example, because it causes trouble breathing), then people who live with you should not stay in the same room with you; they should also wear a facemask if they enter your room. Cover your coughs and sneezes  Cover your mouth and nose with a tissue when you cough or sneeze. Throw used tissues in a lined trash can. Immediately wash your hands with soap and water for at least 20 seconds or, if soap and water are not available, clean your hands with an alcohol-based hand  that contains at least 60% alcohol. Clean your hands often  Wash your hands often with soap and water for at least 20 seconds, especially after blowing your nose, coughing, or sneezing; going to the bathroom; and before eating or preparing food.  If soap and water are not readily available, use an alcohol-based hand  with at least 60% alcohol, covering all surfaces of your hands and rubbing them together until they feel dry. Soap and water are the best option if hands are visibly dirty. Avoid touching your eyes, nose, and mouth with unwashed hands. Avoid sharing personal household items  You should not share dishes, drinking glasses, cups, eating utensils, towels, or bedding with other people or pets in your home. After using these items, they should be washed thoroughly with soap and water. Clean all high-touch surfaces everyday  High touch surfaces include counters, tabletops, doorknobs, bathroom fixtures, toilets, phones, keyboards, tablets, and bedside tables. Also, clean any surfaces that may have blood, stool, or body fluids on them. Use a household cleaning spray or wipe, according to the label instructions. Labels contain instructions for safe and effective use of the cleaning product including precautions you should take when applying the product, such as wearing gloves and making sure you have good ventilation during use of the product. Monitor your symptoms  Seek prompt medical attention if your illness is worsening (e.g., difficulty breathing). Before seeking care, call your healthcare provider and tell them that you have, or are being evaluated for, COVID-19. Put on a facemask before you enter the facility. These steps will help the healthcare providers office to keep other people in the office or waiting room from getting infected or exposed. Persons who are placed under active monitoring or facilitated self-monitoring should follow instructions provided by their local health department or occupational health professionals, as appropriate. When working with your local health department check their available hours. If you have a medical emergency and need to call 911, notify the dispatch personnel that you have, or are being evaluated for COVID-19.  If possible, put on a facemask before emergency medical services arrive. Discontinuing home isolation  Patients with confirmed COVID-19 should remain under home isolation precautions until the risk of secondary transmission to others is thought to be low. The decision to discontinue home isolation precautions should be made on a case-by-case basis, in consultation with your physician and the health department. Please do NOT make this decision on your own. Meridee Odor- keeps someone who might have been exposed to the virus away from others  Isolation - keeps someone who is infected with the virus away from others, even in their homes    Scenario 1    Your patient has close contact with an individual who has COVID-19. Your patient will not have further contact. Plan - Your patient is quarantined from the last day of contact for 14 days    Scenario 2   Your patient has lives with someone who has COVID-19 but can avoid further contact. Plan - Your patient is quarantined for 14 days starting when the person with COVID-19 begins home isolation. Scenario 3    Your patient is under quarantine and has additional close contact with someone else who has COVID-19. Plan - Your patient must restart quarantine from the last COVID-19 exposure. Scenario 4   Your patient lives with someone who has COVID-19 and cannot avoid close contact from them. Plan - Your patient is quarantined while the other person is isolating and for 14 days after covid - 23 person meets the criteria to end home isolation. Treatment with monclonoal antibodies is under investigation and can be considered for patients with mild to moderate COVID-19 who are not on supplemental oxygen and are not hospitalized but who are at [address] [place] [address] for clinical progression have had onset of symptoms within the past 10 days. HIGH RISK is defined as patients who meet at least one of the following criteria:    Have a body mass index (BMI) ? 28    Have chronic kidney disease    Have diabetes    Have immunosuppressive disease    Are currently receiving immunosuppressive treatment    Are ?72years of age  Rashawn Arora  Are ?54years of age AND have  *cardiovascular disease, OR  *hypertension, OR  *chronic obstructive pulmonary disease/other chronic respiratory disease.   Are 15- 16years of age AND have  *BMI ? 85th percentile for their age and gender based on CDC growth charts, [email or web address] , OR  *sickle cell disease, OR  *congenital or acquired heart disease, OR  *neurodevelopmental disorders, for example, cerebral palsy, OR  *a medical-related technological dependence, for example, tracheostomy, gastrostomy, or positive pressure ventilation (not related to   COVID-19), OR  *asthma, reactive airway or other chronic respiratory disease that requires daily medication for control. Other risk factors:   Moderate/severe dementia   Cancer being treated with palliative care   Cirrhosis   Pregnancy   Breast feeding   Weight less than 40Kg (88lbs)      If your results of the COVID-19 test is NEGATIVE -     The patient may stop isolation, in consultation with your health care provider, typically when: Your healthcare provider has determined that the cause of the illness is NOT COVID-19 and approves your return to work. OR  Ten (10) days have passed since onset of symptoms AND one day (24 hours) have passed with no fever without taking medication (like Tylenol) to reduce fever,  respiratory symptoms have resolved and you have been evaluated by your health care provider. Please follow up with your physician for evaluation about this. COVID-19 EXPOSURE    Quarantine  Quarantine if you have been in close contact (within 6 feet of someone for a cumulative total of 15 minutes or more over a 24-hour period) with someone who has COVID-19, unless you have been fully vaccinated.  People who are fully vaccinated do NOT need to quarantine after contact with someone who had COVID-19 unless they have symptoms. However, fully vaccinated people should get tested 3-5 days after their exposure, even if they dont have symptoms and wear a mask indoors in public for 14 days following exposure or until their test result is negative. The following websites are the best places for up to date information on this fluid situation. [employer].nz    The following applies to a person who has clinically recovered from  SARS-CoV-2 infection that was confirmed with a viral diagnostic test and then, within 3 months since the date of symptom onset of the previous illness episode (or date of positive viral diagnostic test if the person never experienced symptoms), is identified as a contact of a new case. If the person remains asymptomatic since the new exposure, then they do not need to be retested for SARS-CoV-2 and do not need to be quarantined. However, if the person experiences new symptoms consistent with COVID-19 and an evaluation fails to identify a diagnosis other than SARS-CoV-2 infection (e.g., influenza), then repeat viral diagnostic testing may be warranted, in consultation with an infectious disease specialist and public health authorities for isolation guidance.

## 2021-12-28 NOTE — LETTER
[address] [facility] [place] [facility]  [address] [facility] [address]  Phone: [phone]  Fax: [phone]    GEOFF Garcia CNP        December 28, 2021     Patient: Rashawn Hugo   YOB: 1995   Date of Visit: 12/28/2021       To Whom It May Concern: It is my medical opinion that Adriana Coon should remain out of work until [facility] results are back in 1-4 days. .    If you have any questions or concerns, please don't hesitate to call.     Sincerely,        GEOFF Garcia CNP

## 2021-12-28 NOTE — PROGRESS NOTES
[facility]  [facility]  [address] [address]  Dept: [phone]  Dept Fax: [phone]    Kami Horton is a 23 y.o. male who presents to the urgent care today for his medical conditions/complaints as notedbelow. Kami Horton is c/o of Headache (2 days), Generalized Body Aches, and Fever      HPI:     23 yr old male presents for covid testing  C/o body aches, fever, cough. covid vaccinated x2 doses  Sx for 2 days    URI   This is a new problem. The current episode started in the past 7 days (x2d). The problem has been gradually worsening. The maximum temperature recorded prior to his arrival was 101 - 101.9 F. Associated symptoms include congestion, coughing, headaches, nausea, rhinorrhea, sneezing, a sore throat, vomiting and wheezing. Pertinent negatives include no abdominal pain, chest pain, diarrhea, dysuria, ear pain, joint pain, joint swelling, neck pain, plugged ear sensation, rash, sinus pain or swollen glands. Treatments tried: inhaler. The treatment provided mild relief.        Past Medical History:   Diagnosis Date    ADHD (attention deficit hyperactivity disorder)      and depressive disorder-Stuckey    Allergic rhinitis     Allergy     seasonal    Asthma     Depression     Headache(784.0)     Scoliosis of lumbar spine 10/1/2012    Vision abnormalities         Current Outpatient Medications   Medication Sig Dispense Refill    albuterol sulfate HFA (VENTOLIN HFA) 108 (90 Base) MCG/ACT inhaler Inhale 2 puffs into the lungs 4 times daily as needed for Wheezing or Shortness of Breath 1 each 1    budesonide-formoterol (SYMBICORT) 80-4.5 MCG/ACT AERO Inhale 2 puffs into the lungs 2 times daily 1 each 1    fluticasone (FLONASE) 50 MCG/ACT nasal spray 1 spray by Nasal route daily 1 each 1    predniSONE (DELTASONE) 20 MG tablet Take 1 tablet by mouth 2 times daily for 5 days 10 tablet 0    bictegravir-emtricitab-tenofovir alafenamide (BIKTARVY) -25 MG TABS per tablet Biktarvy 50 mg-200 mg-25 mg tablet   TAKE ONE TABLET BY MOUTH EVERY DAY      butalbital-acetaminophen-caffeine (FIORICET, ESGIC) -40 MG per tablet Take 1 tablet by mouth every 4 hours as needed for Migraine 180 tablet 3    Lactobacillus (PROBIOTIC ACIDOPHILUS) TABS Take 1 tablet by mouth daily 30 tablet 0    butalbital-acetaminophen-caffeine (FIORICET, ESGIC) -40 MG per tablet Take 1 tablet by mouth every 8 hours as needed for Headaches (Patient not taking: Reported on 12/28/2021) 30 tablet 0    benzocaine-menthol (CEPACOL) 15-4 MG LOZG lozenge Take 1 lozenge by mouth every 2 hours as needed for Sore Throat (Patient not taking: Reported on 12/28/2021) 30 lozenge 0    acetaminophen (TYLENOL) 325 MG tablet Take 2 tablets by mouth every 6 hours as needed for Pain 112 tablet 0    ibuprofen (IBU) 400 MG tablet Take 1 tablet by mouth every 6 hours as needed for Pain 56 tablet 0    venlafaxine (EFFEXOR XR) 37.5 MG extended release capsule Take 1 capsule by mouth daily (Patient not taking: Reported on 12/28/2021) 30 capsule 2    mometasone (ELOCON) 0.1 % ointment Apply once daily as needed for flare of lupus rash (Patient not taking: Reported on 12/28/2021) 15 g 2    triamcinolone (KENALOG) 0.025 % ointment Apply topically 2 times daily to lupus rash (Patient not taking: Reported on 12/28/2021) 80 g 2    loratadine (CLARITIN) 10 MG tablet Take 1 tablet by mouth daily (Patient not taking: Reported on 12/28/2021) 30 tablet 1    triamcinolone (NASACORT) 55 MCG/ACT nasal inhaler 2 sprays by Nasal route daily (Patient not taking: Reported on 12/28/2021) 1 Inhaler 1    diphenhydrAMINE (BENADRYL) 25 MG tablet Take 1 tablet by mouth every 6 hours as needed for Itching or Allergies (Patient not taking: Reported on 12/28/2021) 30 tablet 0    pantoprazole (PROTONIX) 20 MG tablet Take 1 tablet by mouth daily for 14 days 14 tablet 0     No current facility-administered medications for this visit. No Known Allergies    Subjective:      Review of Systems   HENT: Positive for congestion, rhinorrhea, sneezing and sore throat. Negative for ear pain and sinus pain. Respiratory: Positive for cough and wheezing. Cardiovascular: Negative for chest pain. Gastrointestinal: Positive for nausea and vomiting. Negative for abdominal pain and diarrhea. Genitourinary: Negative for dysuria. Musculoskeletal: Negative for joint pain and neck pain. Skin: Negative for rash. Neurological: Positive for headaches. All other systems reviewed and are negative. 14 systems reviewed and negative except as listed in HPI. Objective:     Physical Exam  Vitals and nursing note reviewed. Constitutional:       General: He is not in acute distress. Appearance: Normal appearance. He is well-developed. He is not ill-appearing, toxic-appearing or diaphoretic. Comments: nontoxic   HENT:      Head: Normocephalic and atraumatic. Right Ear: Tympanic membrane, ear canal and external ear normal.      Left Ear: Tympanic membrane, ear canal and external ear normal.      Nose: Congestion present. No rhinorrhea. Mouth/Throat:      Mouth: Mucous membranes are moist.      Pharynx: Posterior oropharyngeal erythema present. No oropharyngeal exudate. Comments: Thin white pnd  Eyes:      General: No scleral icterus. Right eye: No discharge. Left eye: No discharge. Extraocular Movements: Extraocular movements intact. Conjunctiva/sclera: Conjunctivae normal.      Pupils: Pupils are equal, round, and reactive to light. Cardiovascular:      Rate and Rhythm: Normal rate and regular rhythm. Pulses: Normal pulses. Heart sounds: Normal heart sounds. Pulmonary:      Effort: Pulmonary effort is normal. No respiratory distress. Breath sounds: Normal breath sounds. No stridor. No wheezing, rhonchi or rales. Chest:      Chest wall: No tenderness.    Abdominal: General: Bowel sounds are normal. There is no distension. Palpations: Abdomen is soft. There is no mass. Tenderness: There is no abdominal tenderness. There is no guarding or rebound. Hernia: No hernia is present. Musculoskeletal:         General: No tenderness or deformity. Normal range of motion. Cervical back: Normal range of motion and neck supple. Lymphadenopathy:      Cervical: No cervical adenopathy. Skin:     General: Skin is warm and dry. Capillary Refill: Capillary refill takes less than 2 seconds. Findings: No rash ( no rash to visible skin). Neurological:      General: No focal deficit present. Mental Status: He is alert and oriented to person, place, and time. Motor: No abnormal muscle tone. Coordination: Coordination normal.   Psychiatric:         Mood and Affect: Mood normal.         Behavior: Behavior normal.       /75   Pulse 84   Temp 101.3 °F (38.5 °C)   SpO2 98%     Assessment:       Diagnosis Orders   1. Viral URI with cough  COVID-19   2. Mild intermittent asthma without complication  albuterol sulfate HFA (VENTOLIN HFA) 108 (90 Base) MCG/ACT inhaler    budesonide-formoterol (SYMBICORT) 80-4.5 MCG/ACT AERO   3. Seasonal allergic rhinitis due to pollen  fluticasone (FLONASE) 50 MCG/ACT nasal spray       Plan:   URI symptoms  Refilled Ventolin inhaler  Refilled Symbicort inhaler  Flonase for nasal congestion and postnasal drip  Patient requests prednisone for mild asthma symptoms  Reviewed over-the-counter treatments for symptom management. Will send out COVID19 testing. Possible treatment alterations based on the results. Patient instructed to self-quarantine until testing results are back. Patient instructed not to return to work until results are back. Tylenol as needed for fever/pain. Encouraged adequate hydration and rest.  The patient indicates understanding of these issues and agrees with the plan.   Educational materials provided on AVS.  Follow up if symptoms do not improve/worsen. Discussed symptoms that will warrant urgent ED evaluation/treatment. Return if symptoms worsen or fail to improve, for Make an Appt. with your Primary Care in 1 week. Orders Placed This Encounter   Medications    albuterol sulfate HFA (VENTOLIN HFA) 108 (90 Base) MCG/ACT inhaler     Sig: Inhale 2 puffs into the lungs 4 times daily as needed for Wheezing or Shortness of Breath     Dispense:  1 each     Refill:  1    budesonide-formoterol (SYMBICORT) 80-4.5 MCG/ACT AERO     Sig: Inhale 2 puffs into the lungs 2 times daily     Dispense:  1 each     Refill:  1    fluticasone (FLONASE) 50 MCG/ACT nasal spray     Si spray by Nasal route daily     Dispense:  1 each     Refill:  1    predniSONE (DELTASONE) 20 MG tablet     Sig: Take 1 tablet by mouth 2 times daily for 5 days     Dispense:  10 tablet     Refill:  0         Patient given educational materials - see patient instructions. Discussed use, benefit, and side effects of prescribed medications. All patient questions answered. Pt voicedunderstanding.     Electronically signed by GEOFF Hutchison CNP on 2021 at 7:02 PM

## 2021-12-29 DIAGNOSIS — J06.9 VIRAL URI WITH COUGH: ICD-10-CM

## 2021-12-30 LAB
SARS-COV-2: ABNORMAL
SARS-COV-2: DETECTED
SOURCE: ABNORMAL

## 2022-06-26 ENCOUNTER — HOSPITAL ENCOUNTER (EMERGENCY)
Age: 27
Discharge: HOME | End: 2022-06-26
Attending: EMERGENCY MEDICINE
Payer: COMMERCIAL

## 2022-06-26 ENCOUNTER — APPOINTMENT (OUTPATIENT)
Dept: GENERAL RADIOLOGY | Age: 27
End: 2022-06-26
Payer: COMMERCIAL

## 2022-06-26 VITALS
SYSTOLIC BLOOD PRESSURE: 119 MMHG | OXYGEN SATURATION: 100 % | RESPIRATION RATE: 11 BRPM | TEMPERATURE: 98.2 F | DIASTOLIC BLOOD PRESSURE: 71 MMHG | HEART RATE: 65 BPM

## 2022-06-26 DIAGNOSIS — F10.920 ACUTE ALCOHOLIC INTOXICATION WITHOUT COMPLICATION (HCC): Primary | ICD-10-CM

## 2022-06-26 LAB
ABSOLUTE EOS #: 0.31 K/UL (ref 0–0.4)
ABSOLUTE IMMATURE GRANULOCYTE: 0 K/UL (ref 0–0.3)
ABSOLUTE LYMPH #: 1.35 K/UL (ref 1–4.8)
ABSOLUTE MONO #: 0.36 K/UL (ref 0.1–0.8)
ACETAMINOPHEN LEVEL: <5 UG/ML (ref 10–30)
ALBUMIN SERPL-MCNC: 4 G/DL (ref 3.5–5.2)
ALBUMIN/GLOBULIN RATIO: 1.3 (ref 1–2.5)
ALP BLD-CCNC: 48 U/L (ref 40–129)
ALT SERPL-CCNC: 11 U/L (ref 5–41)
ANION GAP SERPL CALCULATED.3IONS-SCNC: 15 MMOL/L (ref 9–17)
AST SERPL-CCNC: 19 U/L
BASOPHILS # BLD: 0 % (ref 0–2)
BASOPHILS ABSOLUTE: 0 K/UL (ref 0–0.2)
BILIRUB SERPL-MCNC: 0.17 MG/DL (ref 0.3–1.2)
BUN BLDV-MCNC: 9 MG/DL (ref 6–20)
CALCIUM SERPL-MCNC: 8.7 MG/DL (ref 8.6–10.4)
CHLORIDE BLD-SCNC: 104 MMOL/L (ref 98–107)
CO2: 19 MMOL/L (ref 20–31)
CREAT SERPL-MCNC: 0.87 MG/DL (ref 0.7–1.2)
EOSINOPHILS RELATIVE PERCENT: 6 % (ref 1–4)
ETHANOL PERCENT: 0.14 %
ETHANOL: 144 MG/DL
GFR AFRICAN AMERICAN: >60 ML/MIN
GFR NON-AFRICAN AMERICAN: >60 ML/MIN
GFR SERPL CREATININE-BSD FRML MDRD: ABNORMAL ML/MIN/{1.73_M2}
GLUCOSE BLD-MCNC: 89 MG/DL (ref 70–99)
HCT VFR BLD CALC: 37 % (ref 40.7–50.3)
HEMOGLOBIN: 11.5 G/DL (ref 13–17)
IMMATURE GRANULOCYTES: 0 %
LYMPHOCYTES # BLD: 26 % (ref 24–44)
MAGNESIUM: 2 MG/DL (ref 1.6–2.6)
MCH RBC QN AUTO: 30.7 PG (ref 25.2–33.5)
MCHC RBC AUTO-ENTMCNC: 31.1 G/DL (ref 28.4–34.8)
MCV RBC AUTO: 98.9 FL (ref 82.6–102.9)
MONOCYTES # BLD: 7 % (ref 1–7)
MORPHOLOGY: NORMAL
NRBC AUTOMATED: 0 PER 100 WBC
PDW BLD-RTO: 11.6 % (ref 11.8–14.4)
PLATELET # BLD: 297 K/UL (ref 138–453)
PMV BLD AUTO: 8.6 FL (ref 8.1–13.5)
POTASSIUM SERPL-SCNC: 3.3 MMOL/L (ref 3.7–5.3)
RBC # BLD: 3.74 M/UL (ref 4.21–5.77)
SALICYLATE LEVEL: <1 MG/DL (ref 3–10)
SEG NEUTROPHILS: 61 % (ref 36–66)
SEGMENTED NEUTROPHILS ABSOLUTE COUNT: 3.18 K/UL (ref 1.8–7.7)
SODIUM BLD-SCNC: 138 MMOL/L (ref 135–144)
TOTAL PROTEIN: 7.1 G/DL (ref 6.4–8.3)
TOXIC TRICYCLIC SC,BLOOD: NEGATIVE
WBC # BLD: 5.2 K/UL (ref 3.5–11.3)

## 2022-06-26 PROCEDURE — 93005 ELECTROCARDIOGRAM TRACING: CPT | Performed by: STUDENT IN AN ORGANIZED HEALTH CARE EDUCATION/TRAINING PROGRAM

## 2022-06-26 PROCEDURE — 96361 HYDRATE IV INFUSION ADD-ON: CPT

## 2022-06-26 PROCEDURE — 99285 EMERGENCY DEPT VISIT HI MDM: CPT

## 2022-06-26 PROCEDURE — 2580000003 HC RX 258: Performed by: STUDENT IN AN ORGANIZED HEALTH CARE EDUCATION/TRAINING PROGRAM

## 2022-06-26 PROCEDURE — 83735 ASSAY OF MAGNESIUM: CPT

## 2022-06-26 PROCEDURE — 80307 DRUG TEST PRSMV CHEM ANLYZR: CPT

## 2022-06-26 PROCEDURE — 80143 DRUG ASSAY ACETAMINOPHEN: CPT

## 2022-06-26 PROCEDURE — 80179 DRUG ASSAY SALICYLATE: CPT

## 2022-06-26 PROCEDURE — 85025 COMPLETE CBC W/AUTO DIFF WBC: CPT

## 2022-06-26 PROCEDURE — 73630 X-RAY EXAM OF FOOT: CPT

## 2022-06-26 PROCEDURE — 80053 COMPREHEN METABOLIC PANEL: CPT

## 2022-06-26 PROCEDURE — G0480 DRUG TEST DEF 1-7 CLASSES: HCPCS

## 2022-06-26 PROCEDURE — 96360 HYDRATION IV INFUSION INIT: CPT

## 2022-06-26 RX ORDER — 0.9 % SODIUM CHLORIDE 0.9 %
1000 INTRAVENOUS SOLUTION INTRAVENOUS ONCE
Status: COMPLETED | OUTPATIENT
Start: 2022-06-26 | End: 2022-06-26

## 2022-06-26 RX ADMIN — SODIUM CHLORIDE 1000 ML: 9 INJECTION, SOLUTION INTRAVENOUS at 04:26

## 2022-06-26 NOTE — ED PROVIDER NOTES
[facility]     Emergency Department     Faculty Note/ Attestation      Pt Name: Darryl Ferraro                                       MRN: [id number]  Laithgfsamantha 1995  Date of evaluation: 6/26/2022    Patients PCP:    Keegan Houston MD    Attestation  I performed a history and physical examination of the patient and discussed management with the resident. I reviewed the residents note and agree with the documented findings and plan of care. Any areas of disagreement are noted on the chart. I was personally present for the key portions of any procedures. I have documented in the chart those procedures where I was not present during the key portions. I have reviewed the emergency nurses triage note. I agree with the chief complaint, past medical history, past surgical history, allergies, medications, social and family history as documented unless otherwise noted below. For Physician Assistant/ Nurse Practitioner cases/documentation I have personally evaluated this patient and have completed at least one if not all key elements of the E/M (history, physical exam, and MDM). Additional findings are as noted. Initial Screens:        [place] Coma Scale  Eye Opening: To speech  Best Verbal Response: Oriented  Best Motor Response: Obeys commands  [place] Coma Scale Score: 14    Vitals:    Vitals:    06/26/22 0409 06/26/22 0410 06/26/22 0413 06/26/22 0426   BP:       Pulse:  86 86    Resp:  20 19    Temp:    98.2 °F (36.8 °C)   TempSrc:    Oral   SpO2: 97% 97% 98%        CHIEF COMPLAINT       Chief Complaint   Patient presents with    Alcohol Intoxication       The pt is a 22 YO M who was out drinking with friends for birthday.   THe pt was stumbling around and         EMERGENCY DEPARTMENT COURSE:     -------------------------  BP: 113/74, Temp: 98.2 °F (36.8 °C), Heart Rate: 86, Resp: 19  Pt sleeping in the room airway in tact no signs of distress at this time    Comments  Will need to evaluate outpatient sober or a ride arrives for patient    ED Course as of 06/26/22 0533   [place] Jun 26, 2022   0426 EKG Interpretation   Interpreted by Quentin Avitia DO    Rhythm: normal sinus   Rate: normal  Axis: normal  Ectopy: none  Conduction: RAD  ST Segments: normal  T Waves: normal  Q Waves: none    Clinical Impression: nonspecific [WK]   9207 ETHANOL,ETHA(!): 144 [AB]   0503 EKG Interpretation   Interpreted by Quentin Avitia DO    Rhythm: normal sinus   Rate: normal  Axis: normal  Ectopy: none  Conduction: normal  ST Segments: normal  T Waves: t flattening diffuse  Q Waves: none    Clinical Impression: Diffuse T flattening [WK]      ED Course User Index  [AB] Shanna Donato DO  [WK] DO Quentin Rain DO,, DO, RDMS.   Attending Emergency Physician          Quentin Avitia DO  06/26/22 3200

## 2022-06-26 NOTE — ED PROVIDER NOTES
[facility] ED  Emergency Department  Emergency Medicine Resident Sign-out     Care of Dmitriy Munoz was assumed from Dr. Megan Castro and is being seen for Alcohol Intoxication  . The patient's initial evaluation and plan have been discussed with the prior provider who initially evaluated the patient. EMERGENCY DEPARTMENT COURSE / MEDICAL DECISION MAKING:       MEDICATIONS GIVEN:  Orders Placed This Encounter   Medications    0.9 % sodium chloride bolus       LABS / RADIOLOGY:     Labs Reviewed   CBC WITH AUTO DIFFERENTIAL - Abnormal; Notable for the following components:       Result Value    RBC 3.74 (*)     Hemoglobin 11.5 (*)     Hematocrit 37.0 (*)     RDW 11.6 (*)     Eosinophils % 6 (*)     All other components within normal limits   COMPREHENSIVE METABOLIC PANEL W/ REFLEX TO MG FOR LOW K - Abnormal; Notable for the following components:    Potassium 3.3 (*)     CO2 19 (*)     Total Bilirubin 0.17 (*)     All other components within normal limits   TOX SCR, BLD, ED - Abnormal; Notable for the following components:    Acetaminophen Level <5 (*)     Ethanol 144 (*)     Ethanol percent 8.475 (*)     Salicylate Lvl <1 (*)     All other components within normal limits   MAGNESIUM       No results found. RECENT VITALS:     Temp: 98.2 °F (36.8 °C),  Heart Rate: 86, Resp: 19, BP: 113/74, SpO2: 98 %      This patient is a 23 y.o. Male with alcohol intoxication. Ethanol level elevated at 144 on arrival.  Patient responsive to sternal rub on arrival, but did follow commands. Patient had laboratory work-up which was generally unremarkable other than elevated ethanol level. Received fluids. Patient still remains very drowsy, now opens eyes to voice however unable to stay awake or answer any questions. Awaiting clinical sobriety. Given narcan by EMS with no response. On reevaluation, patient was able to ambulate without issue, but complaining of left foot pain. X-ray negative.  Patient tolerated p.o. Discussed with patient with regards to follow-up with primary care physician and for return precautions. Patient verbalized agreement understanding. Stable for discharge. ED Course as of 06/26/22 1053   Sun Jun 26, 2022   0426 EKG Interpretation   Interpreted by Vera Rangel DO    Rhythm: normal sinus   Rate: normal  Axis: normal  Ectopy: none  Conduction: RAD  ST Segments: normal  T Waves: normal  Q Waves: none    Clinical Impression: nonspecific [WK]   8380 ETHANOL,ETHA(!): 144 [AB]   0503 EKG Interpretation   Interpreted by Vera Rangel DO    Rhythm: normal sinus   Rate: normal  Axis: normal  Ectopy: none  Conduction: normal  ST Segments: normal  T Waves: t flattening diffuse  Q Waves: none    Clinical Impression: Diffuse T flattening [WK]   0535 Reevaluated patient. He is sleeping, arousable to verbal stimuli. We will continue to monitor. [AB]   8197 Patient reevaluated. Still sleepy, still arousable. We will continue to monitor. Patient signed out to oncoming resident. [AB]   0028 On reevaluation, patient able to tolerate p.o. and ambulate independently however complaining of left foot pain. X-rays obtained. [EM]   1052 XR L foot neg [EM]      ED Course User Index  [AB] Luigi Nelson DO  [EM] Hiram Aleman MD  [WK] Vera Rangel DO       OUTSTANDING TASKS / RECOMMENDATIONS:    1. Reeval  2. Dispo     FINAL IMPRESSION:     1.  Acute alcoholic intoxication without complication [facility])        DISPOSITION:         DISPOSITION:  [x]  Discharge   []  Transfer -    []  Admission -     []  Against Medical Advice   []  Eloped   FOLLOW-UP: Chelsea Aviles MD  [address] [phone]    Schedule an appointment as soon as possible for a visit   For follow up    [facility] ED  [address] [facility] [address]  [phone]  Go to   As needed     DISCHARGE MEDICATIONS: New Prescriptions    No medications on file          Hiram Aleman MD  Emergency Medicine Resident  [facility]        Carmela Birmingham MD  Resident  06/26/22 8480

## 2022-06-26 NOTE — ED NOTES
The following labs labeled with pt sticker and tubed to lab:     [x] Blue     [x] Lavender   [] on ice  [x] Green/yellow  [] Green/black [] on ice  [x] Yellow  [x] Red  [] Pink      [] COVID-19 swab    [] Rapid  [] PCR  [] Flu swab  [] Peds Viral Panel     [] Urine Sample  [] Pelvic Cultures  [] Blood Cultures          Yue Antony RN  06/26/22 4869

## 2022-06-26 NOTE — ED PROVIDER NOTES
FACULTY SIGN-OUT  ADDENDUM     Care of this patient was assumed from previous attending physician. The patient's initial evaluation and plan have been discussed with the prior provider who initially evaluated the patient. Attestation  I was available and discussed any additional care issues that arose and coordinated the management plans with the resident(s) caring for the patient during my duty period. Any areas of disagreement with resident's documentation of care or procedures are noted on the chart. I was personally present for the key portions of any/all procedures, during my duty period. I have documented in the chart those procedures where I was not present during the key portions. ED COURSE      The patient was given the following medications:  Orders Placed This Encounter   Medications    0.9 % sodium chloride bolus       RECENT VITALS:   Temp: 98.2 °F (36.8 °C), Heart Rate: 86, Resp: 19, BP: 113/74    MEDICAL DECISION MAKING        Ally Rios is a 23 y.o. male who presents to the Emergency Department with complaints of alcohol intoxication. Patient will be reassessed and discharge when he is clinically sober. Shara Thompson MD, MD, F.A.C.E.P.   Attending Emergency Physician    (Please note that portions of this note were completed with a voice recognition program.  Efforts were made to edit the dictations but occasionally words are mis-transcribed.)         Shara Thompson MD  06/26/22 5116

## 2022-06-26 NOTE — ED NOTES
Patient registered by name and birthday given to nurse and then verified by two identifiers. Pt placed on continuous cardiac monitoring, BP, Pulse ox. EKG obtained. IV established and labs drawn. Patient was out for his bday and was having drinks. Came home and was stumbling around in his room, Mother Asked if he wanted to go to the hospital. Patient received 4 mg of narcan. Patient awakens to verbal stimuli.  Will monitor      Sydney Toribio RN  06/26/22 0442

## 2022-06-26 NOTE — ED PROVIDER NOTES
Alcohol/week: 1.0 standard drink     Types: 1 Standard drinks or equivalent per week     Comment: socially    Drug use: No    Sexual activity: Yes     Partners: Male   Other Topics Concern    Not on file   Social History Narrative    Not on file     Social Determinants of Health     Financial Resource Strain:     Difficulty of Paying Living Expenses: Not on file   Food Insecurity:     Worried About Running Out of Food in the Last Year: Not on file    [place] of Food in the Last Year: Not on file   Transportation Needs:     Lack of Transportation (Medical): Not on file    Lack of Transportation (Non-Medical):  Not on file   Physical Activity:     Days of Exercise per Week: Not on file    Minutes of Exercise per Session: Not on file   Stress:     Feeling of Stress : Not on file   Social Connections:     Frequency of Communication with Friends and Family: Not on file    Frequency of Social Gatherings with Friends and Family: Not on file    Attends Caodaism Services: Not on file    Active Member of [address] [place] [address] [employer] or Organizations: Not on file    Attends Club or Organization Meetings: Not on file    Marital Status: Not on file   Intimate Partner Violence:     Fear of Current or Ex-Partner: Not on file    Emotionally Abused: Not on file    Physically Abused: Not on file    Sexually Abused: Not on file   Housing Stability:     Unable to Pay for Housing in the Last Year: Not on file    Number of [place] in the Last Year: Not on file    Unstable Housing in the Last Year: Not on file       Family History   Problem Relation Age of Onset    Asthma Mother     High Blood Pressure Mother     Asthma Maternal Grandmother     Heart Disease Maternal Grandmother     High Blood Pressure Maternal Grandmother     Arthritis Neg Hx     Birth Defects Neg Hx     Cancer Neg Hx     Depression Neg Hx     Diabetes Neg Hx     Early Death Neg Hx     Hearing Loss Neg Hx     Kidney Disease Neg Hx     High Cholesterol Neg Hx     Learning Disabilities Neg Hx     Mental Illness Neg Hx     Mental Retardation Neg Hx     Miscarriages / Stillbirths Neg Hx     Stroke Neg Hx     Substance Abuse Neg Hx     Vision Loss Neg Hx         Allergies:  Patient has no known allergies. Home Medications:  Prior to Admission medications    Medication Sig Start Date End Date Taking?  Authorizing Provider   albuterol sulfate HFA (VENTOLIN HFA) 108 (90 Base) MCG/ACT inhaler Inhale 2 puffs into the lungs 4 times daily as needed for Wheezing or Shortness of Breath 12/28/21 1/27/22  GEOFF Salvador CNP   budesonide-formoterol (SYMBICORT) 80-4.5 MCG/ACT AERO Inhale 2 puffs into the lungs 2 times daily 12/28/21   GEOFF Salvador CNP   fluticasone (FLONASE) 50 MCG/ACT nasal spray 1 spray by Nasal route daily 12/28/21   GEOFF Salvador CNP   bictegravir-emtricitab-tenofovir alafenamide (BIKTARVY) -25 MG TABS per tablet Biktarvy 50 mg-200 mg-25 mg tablet   TAKE ONE TABLET BY MOUTH EVERY DAY    Historical Provider, MD   butalbital-acetaminophen-caffeine (FIORICET, ESGIC) -40 MG per tablet Take 1 tablet by mouth every 8 hours as needed for Headaches  Patient not taking: Reported on 12/28/2021 10/25/21   Ana Macias MD   benzocaine-menthol (CEPACOL) 15-4 MG LOZG lozenge Take 1 lozenge by mouth every 2 hours as needed for Sore Throat  Patient not taking: Reported on 12/28/2021 12/18/20   Leyda Aguilera DO   acetaminophen (TYLENOL) 325 MG tablet Take 2 tablets by mouth every 6 hours as needed for Pain 12/10/20 12/24/20  Vaishali Hayden DO   ibuprofen (IBU) 400 MG tablet Take 1 tablet by mouth every 6 hours as needed for Pain 12/10/20 12/24/20  Vaishali Hayden DO   venlafaxine (EFFEXOR XR) 37.5 MG extended release capsule Take 1 capsule by mouth daily  Patient not taking: Reported on 12/28/2021 9/22/20   Analilia Gambino MD   butalbital-acetaminophen-caffeine (FIORICET, ESGIC) -40 MG per tablet Take airway. Eyes:      General:         Right eye: No discharge. Left eye: No discharge. Extraocular Movements: Extraocular movements intact. Comments: Pupils 2 mm bilaterally, sluggish   Cardiovascular:      Rate and Rhythm: Normal rate and regular rhythm. Pulmonary:      Effort: Pulmonary effort is normal. No respiratory distress. Breath sounds: Normal breath sounds. Abdominal:      General: There is no distension. Palpations: Abdomen is soft. Tenderness: There is no abdominal tenderness. Musculoskeletal:      Cervical back: Normal range of motion. No rigidity. Comments: Moving all 4 extremities   Skin:     General: Skin is warm. Capillary Refill: Capillary refill takes less than 2 seconds. Comments: No apparent signs of injuries to any extremity   Neurological:      General: No focal deficit present. Comments: GCS 10, opens eyes to pain, incomprehensible sounds, does follow commands. DIFFERENTIAL  DIAGNOSIS     PLAN (LABS / IMAGING / EKG):  Orders Placed This Encounter   Procedures    CBC with Auto Differential    Comprehensive Metabolic Panel w/ Reflex to MG    TOX SCR, BLD, ED    Magnesium    EKG 12 Lead       MEDICATIONS ORDERED:  Orders Placed This Encounter   Medications    0.9 % sodium chloride bolus       DDX: Alcohol intoxication, overdose, electrolyte imbalance    Initial MDM/Plan: 23 y.o. male who presents with concern for alcohol intoxication. Patient was out drinking with friends when he came home and mom is concerned for his level of intoxication so called EMS. On arrival to emergency department patient is alert to sternal rub, GCS 10, protecting airway. We will check basic labs including ethanol level. Will provide fluids. Will reassess.     DIAGNOSTIC RESULTS / EMERGENCYDEPARTMENT COURSE / MDM     LABS:  Labs Reviewed   CBC WITH AUTO DIFFERENTIAL - Abnormal; Notable for the following components:       Result Value    RBC 3.74 (*)     Hemoglobin 11.5 (*)     Hematocrit 37.0 (*)     RDW 11.6 (*)     Eosinophils % 6 (*)     All other components within normal limits   COMPREHENSIVE METABOLIC PANEL W/ REFLEX TO MG FOR LOW K - Abnormal; Notable for the following components:    Potassium 3.3 (*)     CO2 19 (*)     Total Bilirubin 0.17 (*)     All other components within normal limits   TOX SCR, BLD, ED - Abnormal; Notable for the following components:    Acetaminophen Level <5 (*)     Ethanol 144 (*)     Ethanol percent 7.473 (*)     Salicylate Lvl <1 (*)     All other components within normal limits   MAGNESIUM         RADIOLOGY:  No results found. EKG  EKG Interpretation    Interpreted by me    Rhythm: normal sinus   Rate: 85  Axis: Right  Ectopy: none  Conduction: normal  ST Segments: no acute change  T Waves: no acute change  Q Waves: none    Clinical Impression: Nonspecific EKG    All EKG's are interpreted by the Emergency Department Physicianwho either signs or Co-signs this chart in the absence of a cardiologist.    EMERGENCY DEPARTMENT COURSE:  ED Course as of 06/26/22 0707   [place] Jun 26, 2022   0426 EKG Interpretation   Interpreted by Fortino Clarke DO    Rhythm: normal sinus   Rate: normal  Axis: normal  Ectopy: none  Conduction: RAD  ST Segments: normal  T Waves: normal  Q Waves: none    Clinical Impression: nonspecific [WK]   3735 ETHANOL,ETHA(!): 144 [AB]   0503 EKG Interpretation   Interpreted by Fortino Clarke DO    Rhythm: normal sinus   Rate: normal  Axis: normal  Ectopy: none  Conduction: normal  ST Segments: normal  T Waves: t flattening diffuse  Q Waves: none    Clinical Impression: Diffuse T flattening [WK]   0535 Reevaluated patient. He is sleeping, arousable to verbal stimuli. We will continue to monitor. [AB]   3773 Patient reevaluated. Still sleepy, still arousable. We will continue to monitor.   Patient signed out to oncoming resident. [AB]      ED Course User Index  [AB] Dilia Mcghee DO  [WK] Fortino Clarke DO          PROCEDURES:  None    CONSULTS:  None    CRITICAL CARE:  See attending physician note    FINAL IMPRESSION      1. Acute alcoholic intoxication without complication ([facility] [address]          DISPOSITION / PLAN     DISPOSITION signed out      PATIENT REFERRED TO:  No follow-up provider specified.     DISCHARGE MEDICATIONS:  New Prescriptions    No medications on file       Tomer Rodriguez DO  Emergency Medicine Resident    (Please note that portions of this note were completed with a voice recognition program.Efforts were made to edit the dictations but occasionally words are mis-transcribed.)        Pia Trivedi DO  Resident  06/26/22 8137

## 2022-06-29 LAB
EKG ATRIAL RATE: 85 BPM
EKG P AXIS: 77 DEGREES
EKG P-R INTERVAL: 166 MS
EKG Q-T INTERVAL: 398 MS
EKG QRS DURATION: 106 MS
EKG QTC CALCULATION (BAZETT): 473 MS
EKG R AXIS: 97 DEGREES
EKG T AXIS: 41 DEGREES
EKG VENTRICULAR RATE: 85 BPM

## 2022-06-29 PROCEDURE — 93010 ELECTROCARDIOGRAM REPORT: CPT | Performed by: INTERNAL MEDICINE

## 2022-12-19 ENCOUNTER — HOSPITAL ENCOUNTER (EMERGENCY)
Age: 27
Discharge: HOME | End: 2022-12-19
Attending: EMERGENCY MEDICINE
Payer: COMMERCIAL

## 2022-12-19 VITALS
DIASTOLIC BLOOD PRESSURE: 73 MMHG | TEMPERATURE: 100.8 F | HEART RATE: 76 BPM | RESPIRATION RATE: 24 BRPM | OXYGEN SATURATION: 98 % | SYSTOLIC BLOOD PRESSURE: 120 MMHG

## 2022-12-19 DIAGNOSIS — J10.1 INFLUENZA A: Primary | ICD-10-CM

## 2022-12-19 LAB
FLU A ANTIGEN: POSITIVE
FLU B ANTIGEN: NEGATIVE
SARS-COV-2, RAPID: ABNORMAL
SPECIMEN DESCRIPTION: ABNORMAL

## 2022-12-19 PROCEDURE — 99283 EMERGENCY DEPT VISIT LOW MDM: CPT

## 2022-12-19 PROCEDURE — 87804 INFLUENZA ASSAY W/OPTIC: CPT

## 2022-12-19 PROCEDURE — 6370000000 HC RX 637 (ALT 250 FOR IP): Performed by: EMERGENCY MEDICINE

## 2022-12-19 PROCEDURE — 87635 SARS-COV-2 COVID-19 AMP PRB: CPT

## 2022-12-19 PROCEDURE — 93005 ELECTROCARDIOGRAM TRACING: CPT | Performed by: EMERGENCY MEDICINE

## 2022-12-19 RX ORDER — ONDANSETRON 4 MG/1
4 TABLET, ORALLY DISINTEGRATING ORAL EVERY 8 HOURS PRN
Status: DISCONTINUED | OUTPATIENT
Start: 2022-12-19 | End: 2022-12-19 | Stop reason: HOSPADM

## 2022-12-19 RX ORDER — OSELTAMIVIR PHOSPHATE 75 MG/1
75 CAPSULE ORAL 2 TIMES DAILY
Qty: 10 CAPSULE | Refills: 0 | Status: SHIPPED | OUTPATIENT
Start: 2022-12-19 | End: 2022-12-24

## 2022-12-19 RX ORDER — BENZONATATE 100 MG/1
100 CAPSULE ORAL 3 TIMES DAILY PRN
Qty: 15 CAPSULE | Refills: 0 | Status: SHIPPED | OUTPATIENT
Start: 2022-12-19 | End: 2022-12-26

## 2022-12-19 RX ORDER — ACETAMINOPHEN 325 MG/1
650 TABLET ORAL ONCE
Status: COMPLETED | OUTPATIENT
Start: 2022-12-19 | End: 2022-12-19

## 2022-12-19 RX ORDER — ONDANSETRON 4 MG/1
4 TABLET, FILM COATED ORAL EVERY 8 HOURS PRN
Qty: 15 TABLET | Refills: 0 | Status: SHIPPED | OUTPATIENT
Start: 2022-12-19

## 2022-12-19 RX ADMIN — ACETAMINOPHEN 650 MG: 325 TABLET ORAL at 12:29

## 2022-12-19 RX ADMIN — ONDANSETRON 4 MG: 4 TABLET, ORALLY DISINTEGRATING ORAL at 12:29

## 2022-12-19 NOTE — ED PROVIDER NOTES
[facility]  ED  Emergency Department Encounter  Emergency Medicine Resident     Pt Name: Reginaldo Erazo  [de-identified]  [place] 1995  Date of evaluation: 22  PCP:  Luis Polanco MD    CHIEF COMPLAINT       Chief Complaint   Patient presents with    Emesis    Fever     States was 104 @ home     HISTORY OF PRESENT ILLNESS  (Location/Symptom, Timing/Onset, Context/Setting, Quality, Duration, ModifyingFactors, Severity.)      Reginaldo Erazo is a 33 y.o. male with PMH of HIV on antiretrovirals which is undetectable who presents for evaluation of myalgias, fever, chest pain, shortness of breath, abdominal pain, vomiting. Patient has 2 episodes of nonbloody nonbilious emesis. Chest pain only when coughing. No diarrhea, constipation, blood in stool, hematemesis, palpitations, leg swelling, hemoptysis, urinary symptoms. Patient is vaccinated for both COVID and influenza. No history of blood clots or cancer. No recent travel or surgery. PAST MEDICAL / SURGICAL / SOCIAL /FAMILY HISTORY      has a past medical history of ADHD (attention deficit hyperactivity disorder), Allergic rhinitis, Allergy, Asthma, Depression, Headache(784.0), Scoliosis of lumbar spine, and Vision abnormalities. No other pertinent PMH on review with patient/guardian. has a past surgical history that includes Tonsillectomy. No other pertinent PSH on review with patient/guardian. Social History     Socioeconomic History    Marital status: Single     Spouse name: Not on file    Number of children: Not on file    Years of education: Not on file    Highest education level: Not on file   Occupational History    Not on file   Tobacco Use    Smoking status: Former     Types: Cigarettes     Quit date: 1/3/2020     Years since quittin.9    Smokeless tobacco: Never   Vaping Use    Vaping Use: Never used   Substance and Sexual Activity    Alcohol use:  Yes     Alcohol/week: 1.0 standard drink     Types: 1 Standard drinks or equivalent per week     Comment: socially    Drug use: No    Sexual activity: Yes     Partners: Male   Other Topics Concern    Not on file   Social History Narrative    Not on file     Social Determinants of Health     Financial Resource Strain: Not on file   Food Insecurity: Not on file   Transportation Needs: Not on file   Physical Activity: Not on file   Stress: Not on file   Social Connections: Not on file   Intimate Partner Violence: Not on file   Housing Stability: Not on file       I counseled the patient against using tobacco products. Family History   Problem Relation Age of Onset    Asthma Mother     High Blood Pressure Mother     Asthma Maternal Grandmother     Heart Disease Maternal Grandmother     High Blood Pressure Maternal Grandmother     Arthritis Neg Hx     Birth Defects Neg Hx     Cancer Neg Hx     Depression Neg Hx     Diabetes Neg Hx     Early Death Neg Hx     Hearing Loss Neg Hx     Kidney Disease Neg Hx     High Cholesterol Neg Hx     Learning Disabilities Neg Hx     Mental Illness Neg Hx     Mental Retardation Neg Hx     Miscarriages / Stillbirths Neg Hx     Stroke Neg Hx     Substance Abuse Neg Hx     Vision Loss Neg Hx      No other pertinent FamHx on review with patient/guardian. Allergies:  Patient has no known allergies. Home Medications:  Prior to Admission medications    Medication Sig Start Date End Date Taking?  Authorizing Provider   oseltamivir (TAMIFLU) 75 MG capsule Take 1 capsule by mouth 2 times daily for 5 days 12/19/22 12/24/22 Yes Irving Amaro, DO   ondansetron (ZOFRAN) 4 MG tablet Take 1 tablet by mouth every 8 hours as needed for Nausea 12/19/22  Yes Arvclovis Amaro, DO   benzonatate (TESSALON PERLES) 100 MG capsule Take 1 capsule by mouth 3 times daily as needed for Cough 12/19/22 12/26/22 Yes Irving Amaro, DO   albuterol sulfate HFA (VENTOLIN HFA) 108 (90 Base) MCG/ACT inhaler Inhale 2 puffs into the lungs 4 times daily as needed for 5/14/19   Camelia Mcdaniels MD   triamcinolone (NASACORT) 55 MCG/ACT nasal inhaler 2 sprays by Nasal route daily  Patient not taking: Reported on 12/28/2021 5/14/19   Camelia Mcdaniels MD   diphenhydrAMINE (BENADRYL) 25 MG tablet Take 1 tablet by mouth every 6 hours as needed for Itching or Allergies  Patient not taking: Reported on 12/28/2021 8/11/18   Rex Jara MD   pantoprazole (PROTONIX) 20 MG tablet Take 1 tablet by mouth daily for 14 days 7/31/18 9/22/20  Karishma Love MD   Lactobacillus (PROBIOTIC ACIDOPHILUS) TABS Take 1 tablet by mouth daily 4/12/18   Jaimie Nieves MD       REVIEW OF SYSTEMS    (2-9 systems for level 4, 10 ormore for level 5)      Review of Systems   Constitutional:  Positive for chills and fever. HENT:  Positive for congestion and rhinorrhea. Negative for sore throat. Eyes:  Negative for visual disturbance. Respiratory:  Positive for cough and shortness of breath. Cardiovascular:  Positive for chest pain. Negative for palpitations. Gastrointestinal:  Positive for abdominal pain, nausea and vomiting. Negative for blood in stool, constipation and diarrhea. Genitourinary:  Negative for dysuria, flank pain and hematuria. Musculoskeletal:  Positive for myalgias. Skin:  Negative for rash. Allergic/Immunologic: Negative for immunocompromised state. Neurological:  Negative for dizziness, weakness and headaches. Hematological:  Does not bruise/bleed easily. PHYSICAL EXAM   (up to 7 for level 4, 8 or more for level 5)      INITIAL VITALS:   /73   Pulse 76   Temp (!) 100.8 °F (38.2 °C) (Oral)   Resp 24   SpO2 98%     Physical Exam  Constitutional:       General: He is not in acute distress. Appearance: Normal appearance. He is not ill-appearing, toxic-appearing or diaphoretic. HENT:      Head: Normocephalic and atraumatic. Right Ear: External ear normal.      Left Ear: External ear normal.      Nose: Congestion and rhinorrhea present. Eyes:      General:         Right eye: No discharge. Left eye: No discharge. Cardiovascular:      Rate and Rhythm: Normal rate and regular rhythm. Pulses: Normal pulses. Heart sounds: No murmur heard. Pulmonary:      Effort: Pulmonary effort is normal. No respiratory distress. Breath sounds: Normal breath sounds. No wheezing, rhonchi or rales. Abdominal:      Palpations: Abdomen is soft. Tenderness: There is abdominal tenderness (Mild diffuse tenderness). Musculoskeletal:      Right lower leg: No edema. Left lower leg: No edema. Skin:     Capillary Refill: Capillary refill takes less than 2 seconds. Neurological:      General: No focal deficit present. Mental Status: He is alert.        DIFFERENTIAL  DIAGNOSIS     PLAN (LABS / IMAGING / EKG):  Orders Placed This Encounter   Procedures    RAPID INFLUENZA A/B ANTIGENS    COVID-19, Rapid    EKG 12 Lead       MEDICATIONS ORDERED:  Orders Placed This Encounter   Medications    acetaminophen (TYLENOL) tablet 650 mg    DISCONTD: ondansetron (ZOFRAN-ODT) disintegrating tablet 4 mg    oseltamivir (TAMIFLU) 75 MG capsule     Sig: Take 1 capsule by mouth 2 times daily for 5 days     Dispense:  10 capsule     Refill:  0    ondansetron (ZOFRAN) 4 MG tablet     Sig: Take 1 tablet by mouth every 8 hours as needed for Nausea     Dispense:  15 tablet     Refill:  0    benzonatate (TESSALON PERLES) 100 MG capsule     Sig: Take 1 capsule by mouth 3 times daily as needed for Cough     Dispense:  15 capsule     Refill:  0       DIAGNOSTIC RESULTS / EMERGENCY DEPARTMENT COURSE / MDM     LABS:  Results for orders placed or performed during the hospital encounter of 12/19/22   RAPID INFLUENZA A/B ANTIGENS    Specimen: Nasopharyngeal   Result Value Ref Range    Flu A Antigen POSITIVE (A) NEGATIVE    Flu B Antigen NEGATIVE NEGATIVE   COVID-19, Rapid    Specimen: Nasopharyngeal Swab   Result Value Ref Range    Specimen Description [place] Push NASOPHARYNGEAL SWAB     SARS-CoV-2, Rapid Indeterminate (A) Not Detected   EKG 12 Lead   Result Value Ref Range    Ventricular Rate 68 BPM    Atrial Rate 68 BPM    P-R Interval 152 ms    QRS Duration 92 ms    Q-T Interval 408 ms    QTc Calculation (Bazett) 433 ms    P Axis 80 degrees    R Axis 93 degrees    T Axis 62 degrees       IMPRESSION/MDM/ED COURSE:  33 y.o. male presented with myalgias, fever, chest pain, shortness of breath, abdominal pain, vomiting that began yesterday. Patient febrile at 100.8, last dose of NSAIDs was yesterday. Patient documented as tachypneic at 24 in triage however not tachypneic on my exam.  Satting high 90s on room air. On exam patient resting comfortably no acute distress, nontoxic-appearing. Heart RRR, lungs clear. No increased work of breathing accessory muscle use. Speaking comfortably in full sentences. Abdomen mildly tender diffusely. Soft without guarding or rebound. No CVA tenderness. Low suspicion for cardiac cause of symptoms given age, lack of cardiac history, associated symptoms, benign exam, and that chest pain only occurs while coughing. Suspect viral syndrome. Will obtain COVID and influenza. Lungs are clear and cough is mild therefore no CXR at this time. Symptomatic treatment with Zofran and Tylenol. ED Course as of 12/20/22 0938   Mon Dec 19, 2022   1335 RAPID INFLUENZA A/B ANTIGENS(!):    Flu A Antigen POSITIVE(!)   Flu B Antigen NEGATIVE [AF]   4522 Influenza positive, COVID indeterminant. Discussed COVID PCR with patient, they prefer to defer as influenza is positive and COVID results would not . Patient with improving symptoms, tolerating p.o.   Will discharge with Tamiflu, Zofran and Tessalon Perles, continue Tylenol and ibuprofen. [AF]      ED Course User Index  [AF] Alberto Lyons, DO     RADIOLOGY:  No orders to display       EKG  None    All EKG's are interpreted by the Emergency Department Physician who either signs or Co-signs this chart in the absence of a cardiologist.    PROCEDURES:  None    CONSULTS:  None    FINAL IMPRESSION      1.  Influenza A          DISPOSITION / PLAN     DISPOSITION Decision To Discharge 12/19/2022 01:50:40 PM      PATIENT REFERREDTO:  Keenan Cogan, MD  [facility]  [phone]    In 2 days  If symptoms persist    DISCHARGE MEDICATIONS:  Discharge Medication List as of 12/19/2022  1:53 PM        START taking these medications    Details   oseltamivir (TAMIFLU) 75 MG capsule Take 1 capsule by mouth 2 times daily for 5 days, Disp-10 capsule, R-0Print      ondansetron (ZOFRAN) 4 MG tablet Take 1 tablet by mouth every 8 hours as needed for Nausea, Disp-15 tablet, R-0Print      benzonatate (TESSALON PERLES) 100 MG capsule Take 1 capsule by mouth 3 times daily as needed for Cough, Disp-15 capsule, R-0Print             Ahsan Schultz DO  PGY 3  Resident Physician Emergency Medicine  12/20/22 9:38 AM    (Please note that portions of this note were completed with a voice recognition program.Efforts were made to edit the dictations but occasionally words are mis-transcribed.)       Jeremiah Oliver DO  Resident  12/20/22 0541

## 2022-12-19 NOTE — ED PROVIDER NOTES
[facility]  Emergency Department  Faculty Attestation     I performed a history and physical examination of the patient and discussed management with the resident. I reviewed the residents note and agree with the documented findings and plan of care. Any areas of disagreement are noted on the chart. I was personally present for the key portions of any procedures. I have documented in the chart those procedures where I was not present during the key portions. I have reviewed the emergency nurses triage note. I agree with the chief complaint, past medical history, past surgical history, allergies, medications, social and family history as documented unless otherwise noted below. For Physician Assistant/ Nurse Practitioner cases/documentation I have personally evaluated this patient and have completed at least one if not all key elements of the E/M (history, physical exam, and MDM). Additional findings are as noted. Primary Care Physician:  Jason Coffman MD    Screenings:  [unfilled]    CHIEF COMPLAINT       Chief Complaint   Patient presents with    Emesis    Fever     States was 104 @ home       RECENT VITALS:   Temp: (!) 100.8 °F (38.2 °C),  Heart Rate: 76, Resp: 24, BP: 120/73    LABS:  Labs Reviewed - No data to display    Radiology  No orders to display         EKG:  EKG Interpretation    Interpreted by me    Rhythm: normal sinus   Rate: normal  Axis: normal  Ectopy: none  Conduction: normal  ST Segments: no acute change  T Waves: no acute change  Q Waves: none    Clinical Impression: no acute changes and normal EKG    Attending Physician Additional  Notes    Patient's been over the past 2 days with viral syndrome symptoms including myalgias fevers chills nausea cough and congestion. There is mild headache but no stiff neck. She has HIV but undetectable viral load and normal CD4 count. She is compliant with her medications.   She denies blood in the emesis or dark sputum. On exam she has low-grade fever, other vital signs normal.  Lungs are clear. Neck is supple without lymphadenopathy. Oropharynx has injection but no tonsillar enlargement. Lungs are clear. Abdomen is soft and nontender without distention or rebound or tympany. Impression is viral syndrome likely influenza. Plan is flu assay, antipyretics, Zofran, oral challenge, reassess. Anticipate discharge with symptomatic care and return precautions. [facility].  Erika Deleon MD, [address] [facility]  Attending Emergency  Physician                Jolanta Ferrell MD  12/19/22 [id number]

## 2022-12-19 NOTE — DISCHARGE INSTRUCTIONS
Take Tamiflu as prescribed. Take Zofran as needed for nausea/vomiting. Take Tessalon Perles as needed for cough. This medication is dangerous for children so please keep out of the reach of children. Take Tylenol and/or ibuprofen as needed for pain and fever. Follow-up with your primary care provider symptoms persist in 2 days. Return to the ED with difficulty breathing, inability to keep fluids down, or other new/concerning symptoms.

## 2022-12-20 LAB
EKG ATRIAL RATE: 68 BPM
EKG P AXIS: 80 DEGREES
EKG P-R INTERVAL: 152 MS
EKG Q-T INTERVAL: 408 MS
EKG QRS DURATION: 92 MS
EKG QTC CALCULATION (BAZETT): 433 MS
EKG R AXIS: 93 DEGREES
EKG T AXIS: 62 DEGREES
EKG VENTRICULAR RATE: 68 BPM

## 2022-12-20 PROCEDURE — 93010 ELECTROCARDIOGRAM REPORT: CPT | Performed by: INTERNAL MEDICINE

## 2022-12-20 ASSESSMENT — ENCOUNTER SYMPTOMS
NAUSEA: 1
DIARRHEA: 0
VOMITING: 1
COUGH: 1
ABDOMINAL PAIN: 1
SHORTNESS OF BREATH: 1
CONSTIPATION: 0
SORE THROAT: 0
RHINORRHEA: 1
BLOOD IN STOOL: 0

## 2023-06-16 ENCOUNTER — APPOINTMENT (OUTPATIENT)
Dept: GENERAL RADIOLOGY | Age: 28
End: 2023-06-16
Payer: COMMERCIAL

## 2023-06-16 ENCOUNTER — HOSPITAL ENCOUNTER (EMERGENCY)
Age: 28
Discharge: HOME | End: 2023-06-16
Attending: EMERGENCY MEDICINE
Payer: COMMERCIAL

## 2023-06-16 ENCOUNTER — APPOINTMENT (OUTPATIENT)
Dept: CT IMAGING | Age: 28
End: 2023-06-16
Payer: COMMERCIAL

## 2023-06-16 VITALS
TEMPERATURE: 99.6 F | HEIGHT: 70 IN | BODY MASS INDEX: 20.07 KG/M2 | OXYGEN SATURATION: 99 % | WEIGHT: 140.21 LBS | DIASTOLIC BLOOD PRESSURE: 68 MMHG | HEART RATE: 70 BPM | RESPIRATION RATE: 16 BRPM | SYSTOLIC BLOOD PRESSURE: 112 MMHG

## 2023-06-16 DIAGNOSIS — R11.2 NAUSEA AND VOMITING, UNSPECIFIED VOMITING TYPE: Primary | ICD-10-CM

## 2023-06-16 LAB
ALBUMIN SERPL-MCNC: 4.4 G/DL (ref 3.5–5.2)
ALBUMIN/GLOB SERPL: 1.4 {RATIO} (ref 1–2.5)
ALP SERPL-CCNC: 73 U/L (ref 40–129)
ALT SERPL-CCNC: 11 U/L (ref 5–41)
ANION GAP SERPL CALCULATED.3IONS-SCNC: 10 MMOL/L (ref 9–17)
AST SERPL-CCNC: 24 U/L
BASOPHILS # BLD: <0.03 K/UL (ref 0–0.2)
BASOPHILS NFR BLD: 0 % (ref 0–2)
BILIRUB SERPL-MCNC: 0.6 MG/DL (ref 0.3–1.2)
BILIRUB UR QL STRIP: NEGATIVE
BUN SERPL-MCNC: 9 MG/DL (ref 6–20)
CALCIUM SERPL-MCNC: 8.9 MG/DL (ref 8.6–10.4)
CHLORIDE SERPL-SCNC: 104 MMOL/L (ref 98–107)
CLARITY UR: CLEAR
CO2 SERPL-SCNC: 22 MMOL/L (ref 20–31)
COLOR UR: YELLOW
CREAT SERPL-MCNC: 1 MG/DL (ref 0.7–1.2)
EOSINOPHIL # BLD: 0.04 K/UL (ref 0–0.44)
EOSINOPHILS RELATIVE PERCENT: 0 % (ref 1–4)
EPI CELLS #/AREA URNS HPF: NORMAL /HPF (ref 0–5)
ERYTHROCYTE [DISTWIDTH] IN BLOOD BY AUTOMATED COUNT: 12.1 % (ref 11.8–14.4)
GFR SERPL CREATININE-BSD FRML MDRD: >60 ML/MIN/1.73M2
GLUCOSE SERPL-MCNC: 88 MG/DL (ref 70–99)
GLUCOSE UR STRIP-MCNC: NEGATIVE MG/DL
HCT VFR BLD AUTO: 41 % (ref 40.7–50.3)
HGB BLD-MCNC: 13 G/DL (ref 13–17)
HGB UR QL STRIP.AUTO: NEGATIVE
IMM GRANULOCYTES # BLD AUTO: 0.05 K/UL (ref 0–0.3)
IMM GRANULOCYTES NFR BLD: 0 %
KETONES UR STRIP-MCNC: NEGATIVE MG/DL
LEUKOCYTE ESTERASE UR QL STRIP: ABNORMAL
LIPASE SERPL-CCNC: 12 U/L (ref 13–60)
LYMPHOCYTES # BLD: 9 % (ref 24–43)
LYMPHOCYTES NFR BLD: 1 K/UL (ref 1.1–3.7)
MCH RBC QN AUTO: 30.1 PG (ref 25.2–33.5)
MCHC RBC AUTO-ENTMCNC: 31.7 G/DL (ref 28.4–34.8)
MCV RBC AUTO: 94.9 FL (ref 82.6–102.9)
MONOCYTES NFR BLD: 0.86 K/UL (ref 0.1–1.2)
MONOCYTES NFR BLD: 8 % (ref 3–12)
NEUTROPHILS NFR BLD: 83 % (ref 36–65)
NEUTS SEG NFR BLD: 9.46 K/UL (ref 1.5–8.1)
NITRITE UR QL STRIP: NEGATIVE
NRBC AUTOMATED: 0 PER 100 WBC
PH UR STRIP: 8.5 [PH] (ref 5–8)
PLATELET # BLD AUTO: 245 K/UL (ref 138–453)
PMV BLD AUTO: 9.2 FL (ref 8.1–13.5)
POTASSIUM SERPL-SCNC: 3.8 MMOL/L (ref 3.7–5.3)
PROT SERPL-MCNC: 7.6 G/DL (ref 6.4–8.3)
PROT UR STRIP-MCNC: NEGATIVE MG/DL
RBC # BLD AUTO: 4.32 M/UL (ref 4.21–5.77)
RBC #/AREA URNS HPF: NORMAL /HPF (ref 0–4)
SODIUM SERPL-SCNC: 136 MMOL/L (ref 135–144)
SP GR UR STRIP: 1.05 (ref 1–1.03)
UROBILINOGEN UR STRIP-ACNC: NORMAL
WBC #/AREA URNS HPF: NORMAL /HPF (ref 0–5)
WBC OTHER # BLD: 11.4 K/UL (ref 3.5–11.3)

## 2023-06-16 PROCEDURE — 71045 X-RAY EXAM CHEST 1 VIEW: CPT

## 2023-06-16 PROCEDURE — 96374 THER/PROPH/DIAG INJ IV PUSH: CPT

## 2023-06-16 PROCEDURE — 80053 COMPREHEN METABOLIC PANEL: CPT

## 2023-06-16 PROCEDURE — 74177 CT ABD & PELVIS W/CONTRAST: CPT

## 2023-06-16 PROCEDURE — 2580000003 HC RX 258: Performed by: EMERGENCY MEDICINE

## 2023-06-16 PROCEDURE — 99285 EMERGENCY DEPT VISIT HI MDM: CPT

## 2023-06-16 PROCEDURE — 6360000004 HC RX CONTRAST MEDICATION: Performed by: EMERGENCY MEDICINE

## 2023-06-16 PROCEDURE — 6360000002 HC RX W HCPCS: Performed by: EMERGENCY MEDICINE

## 2023-06-16 PROCEDURE — 6370000000 HC RX 637 (ALT 250 FOR IP): Performed by: EMERGENCY MEDICINE

## 2023-06-16 PROCEDURE — 81001 URINALYSIS AUTO W/SCOPE: CPT

## 2023-06-16 PROCEDURE — 85027 COMPLETE CBC AUTOMATED: CPT

## 2023-06-16 PROCEDURE — 83690 ASSAY OF LIPASE: CPT

## 2023-06-16 PROCEDURE — 96361 HYDRATE IV INFUSION ADD-ON: CPT

## 2023-06-16 RX ORDER — SODIUM CHLORIDE, SODIUM LACTATE, POTASSIUM CHLORIDE, AND CALCIUM CHLORIDE .6; .31; .03; .02 G/100ML; G/100ML; G/100ML; G/100ML
1000 INJECTION, SOLUTION INTRAVENOUS ONCE
Status: COMPLETED | OUTPATIENT
Start: 2023-06-16 | End: 2023-06-16

## 2023-06-16 RX ORDER — ONDANSETRON 2 MG/ML
4 INJECTION INTRAMUSCULAR; INTRAVENOUS ONCE
Status: COMPLETED | OUTPATIENT
Start: 2023-06-16 | End: 2023-06-16

## 2023-06-16 RX ORDER — ACETAMINOPHEN 500 MG
1000 TABLET ORAL ONCE
Status: COMPLETED | OUTPATIENT
Start: 2023-06-16 | End: 2023-06-16

## 2023-06-16 RX ORDER — ONDANSETRON 4 MG/1
4 TABLET, ORALLY DISINTEGRATING ORAL 3 TIMES DAILY PRN
Qty: 9 TABLET | Refills: 0 | Status: SHIPPED | OUTPATIENT
Start: 2023-06-16 | End: 2023-06-19

## 2023-06-16 RX ADMIN — ACETAMINOPHEN 1000 MG: 500 TABLET ORAL at 09:31

## 2023-06-16 RX ADMIN — IOPAMIDOL 75 ML: 755 INJECTION, SOLUTION INTRAVENOUS at 11:10

## 2023-06-16 RX ADMIN — ONDANSETRON 4 MG: 2 INJECTION INTRAMUSCULAR; INTRAVENOUS at 09:33

## 2023-06-16 RX ADMIN — SODIUM CHLORIDE, POTASSIUM CHLORIDE, SODIUM LACTATE AND CALCIUM CHLORIDE 1000 ML: 600; 310; 30; 20 INJECTION, SOLUTION INTRAVENOUS at 09:36

## 2023-06-16 ASSESSMENT — ENCOUNTER SYMPTOMS
CONSTIPATION: 0
ABDOMINAL PAIN: 1
DIARRHEA: 0
NAUSEA: 1
SHORTNESS OF BREATH: 0
SINUS PAIN: 0
BLOOD IN STOOL: 0

## 2023-06-16 ASSESSMENT — PAIN SCALES - GENERAL: PAINLEVEL_OUTOF10: 10

## 2023-06-16 ASSESSMENT — PAIN - FUNCTIONAL ASSESSMENT: PAIN_FUNCTIONAL_ASSESSMENT: 0-10

## 2023-06-16 NOTE — ED PROVIDER NOTES
[facility]  ED  Emergency Department Encounter  Emergency Medicine Resident     Pt Jenny Diallo Ozzie Pro  MRN: [de-identified]  [place] 1995  Date of evaluation: 6/16/23  PCP:  No primary care provider on file. Note Started: 9:05 AM EDT      CHIEF COMPLAINT       Chief Complaint   Patient presents with    Abdominal Pain    Emesis       HISTORY OF PRESENT ILLNESS  (Location/Symptom, Timing/Onset, Context/Setting, Quality, Duration, Modifying Factors, Severity.)      Ildefonso Hobbs is a 33 y.o. male w/ Hx of HIV on antivirals, currently transitioning on estrogen who presents with 3 days of abd pain, nausea, vomiting. She denies diarrhea, fevers or chills. Endorses myalgias and decreased appetite. Has been unable to tolerate PO since yesterday. No recent sick contacts, denies eating anything that tasted bad. Denies dysuria, hematuria, penile discharge or testicular pain. PAST MEDICAL / SURGICAL / SOCIAL / FAMILY HISTORY      has a past medical history of ADHD (attention deficit hyperactivity disorder), Allergic rhinitis, Allergy, Asthma, Depression, Headache(784.0), Scoliosis of lumbar spine, and Vision abnormalities. has a past surgical history that includes Tonsillectomy. Social History     Socioeconomic History    Marital status: Single     Spouse name: Not on file    Number of children: Not on file    Years of education: Not on file    Highest education level: Not on file   Occupational History    Not on file   Tobacco Use    Smoking status: Former     Types: Cigarettes     Quit date: 1/3/2020     Years since quitting: 3.4    Smokeless tobacco: Never   Vaping Use    Vaping Use: Never used   Substance and Sexual Activity    Alcohol use:  Yes     Alcohol/week: 1.0 standard drink     Types: 1 Standard drinks or equivalent per week     Comment: socially    Drug use: No    Sexual activity: Yes     Partners: Male   Other Topics Concern    Not on file   Social History Narrative    Not on
had any surgical revisions yet      EMERGENCY DEPARTMENT COURSE:     -------------------------  BP: 114/66, Temp: 99.6 °F (37.6 °C), Pulse: 86, Respirations: 18  Physical Exam  Constitutional:       Appearance: He is well-developed. He is not diaphoretic. HENT:      Head: Normocephalic and atraumatic. Right Ear: External ear normal.      Left Ear: External ear normal.   Eyes:      General: No scleral icterus. Right eye: No discharge. Left eye: No discharge. Neck:      Trachea: No tracheal deviation. Pulmonary:      Effort: Pulmonary effort is normal. No respiratory distress. Breath sounds: No stridor. Abdominal:      Tenderness: There is abdominal tenderness (pain on the rt side of abdomen). There is no guarding or rebound. Musculoskeletal:         General: Normal range of motion. Cervical back: Normal range of motion. Skin:     General: Skin is warm and dry. Neurological:      Mental Status: He is alert and oriented to person, place, and time. Coordination: Coordination normal.   Psychiatric:         Behavior: Behavior normal.         Comments  Medical Decision Making  Amount and/or Complexity of Data Reviewed  Labs: ordered. Decision-making details documented in ED Course. Radiology: ordered. Decision-making details documented in ED Course. Risk  OTC drugs. Prescription drug management. The patient presents complaining of RLQ abdominal pain. Concerns that urgently need evaluation include: Aortic Aneurysm   Appendicitis  Diverticulitis  Hernia  Ischemic Colitis  Interabdominal Abscess  Ureteral Calculi    Testicular Torsion    Based on exam of the patient the patient is very unlikely to have:  Torsion with normal exam.  The pt having no pain burning discomfort with urination unlikely UTI/ureteral calculi given the lack of cramping pain pain has been constant more concerning for abscess given the patient's HIV status no physical signs of hernia on exam

## 2023-06-16 NOTE — DISCHARGE INSTRUCTIONS
You were seen today for nausea and vomiting. Your lab work was normal including abdominal and liver labs. Your CT scan was consistent with a viral illness/gastroenteritis. Your urine studies were normal. We treated you with tylenol, zofran, and IV fluids. You have a viral infection. I recommend you continue to use tylenol and motrin for fever and body aches. Continue to stay hydrated, drink a lot of water and Gatorade. Please follow up with PCP if your symptoms worsen. If you notice any concerning symptoms please return to the ER immediately. These can include but are not limited to: fevers, chills, shortness of breath, vomiting, weakness of the extremities, changes in your mental status, numbness, pale extremities, or chest pain. Medications: Continue taking your home medications as previously directed. For pain You may take tylenol 1,000mg by mouth every 6 hours as needed for pain. Do not exceed 4,000mg per day. If you have liver disease don't take tylenol. You may also take ibuprofen 600mg every 6-8 hours as needed for pain. Do not exceed 2,400 mg per day. If you experience stomach pain or you have a history of kidney disease stop taking ibuprofen. You may alternate application of ice and heat 20 minutes at a time as desired. You can take zofran for nausea. Follow up: Please follow up with your primary care doctor within one week or as needed.

## 2023-06-16 NOTE — ED TRIAGE NOTES
Patient presents to ED with complaint of abdominal pain and emesis that started about 2 days ago. Patient states that they cannot keep any food down.

## 2023-09-29 ENCOUNTER — HOSPITAL ENCOUNTER (OUTPATIENT)
Age: 28
Discharge: HOME | End: 2023-09-29
Payer: COMMERCIAL

## 2023-09-29 LAB
ESTRADIOL LEVEL: 60 PG/ML
POTASSIUM SERPL-SCNC: 3.8 MMOL/L (ref 3.7–5.3)
TESTOST SERPL-MCNC: 647 NG/DL (ref 220–1000)

## 2023-09-29 PROCEDURE — 84132 ASSAY OF SERUM POTASSIUM: CPT

## 2023-09-29 PROCEDURE — 84403 ASSAY OF TOTAL TESTOSTERONE: CPT

## 2023-09-29 PROCEDURE — 82670 ASSAY OF TOTAL ESTRADIOL: CPT

## 2023-09-29 PROCEDURE — 36415 COLL VENOUS BLD VENIPUNCTURE: CPT

## 2023-10-14 ENCOUNTER — HOSPITAL ENCOUNTER (EMERGENCY)
Age: 28
Discharge: HOME | End: 2023-10-14
Attending: EMERGENCY MEDICINE
Payer: COMMERCIAL

## 2023-10-14 VITALS
WEIGHT: 140.21 LBS | SYSTOLIC BLOOD PRESSURE: 110 MMHG | OXYGEN SATURATION: 99 % | DIASTOLIC BLOOD PRESSURE: 76 MMHG | BODY MASS INDEX: 20.12 KG/M2 | HEART RATE: 72 BPM | RESPIRATION RATE: 16 BRPM | TEMPERATURE: 97.1 F

## 2023-10-14 DIAGNOSIS — R51.9 ACUTE NONINTRACTABLE HEADACHE, UNSPECIFIED HEADACHE TYPE: ICD-10-CM

## 2023-10-14 DIAGNOSIS — F10.920 ACUTE ALCOHOLIC INTOXICATION WITHOUT COMPLICATION (HCC): Primary | ICD-10-CM

## 2023-10-14 PROCEDURE — 6360000002 HC RX W HCPCS: Performed by: STUDENT IN AN ORGANIZED HEALTH CARE EDUCATION/TRAINING PROGRAM

## 2023-10-14 PROCEDURE — 99284 EMERGENCY DEPT VISIT MOD MDM: CPT | Performed by: EMERGENCY MEDICINE

## 2023-10-14 PROCEDURE — 96374 THER/PROPH/DIAG INJ IV PUSH: CPT | Performed by: EMERGENCY MEDICINE

## 2023-10-14 PROCEDURE — 2580000003 HC RX 258: Performed by: STUDENT IN AN ORGANIZED HEALTH CARE EDUCATION/TRAINING PROGRAM

## 2023-10-14 PROCEDURE — 96375 TX/PRO/DX INJ NEW DRUG ADDON: CPT | Performed by: EMERGENCY MEDICINE

## 2023-10-14 RX ORDER — DIPHENHYDRAMINE HYDROCHLORIDE 50 MG/ML
25 INJECTION INTRAMUSCULAR; INTRAVENOUS ONCE
Status: COMPLETED | OUTPATIENT
Start: 2023-10-14 | End: 2023-10-14

## 2023-10-14 RX ORDER — METOCLOPRAMIDE HYDROCHLORIDE 5 MG/ML
10 INJECTION INTRAMUSCULAR; INTRAVENOUS ONCE
Status: COMPLETED | OUTPATIENT
Start: 2023-10-14 | End: 2023-10-14

## 2023-10-14 RX ORDER — ACETAMINOPHEN 500 MG
1000 TABLET ORAL ONCE
Status: DISCONTINUED | OUTPATIENT
Start: 2023-10-14 | End: 2023-10-14

## 2023-10-14 RX ORDER — ONDANSETRON 4 MG/1
4 TABLET, ORALLY DISINTEGRATING ORAL ONCE
Status: DISCONTINUED | OUTPATIENT
Start: 2023-10-14 | End: 2023-10-14

## 2023-10-14 RX ORDER — 0.9 % SODIUM CHLORIDE 0.9 %
1000 INTRAVENOUS SOLUTION INTRAVENOUS ONCE
Status: COMPLETED | OUTPATIENT
Start: 2023-10-14 | End: 2023-10-14

## 2023-10-14 RX ADMIN — METOCLOPRAMIDE 10 MG: 5 INJECTION, SOLUTION INTRAMUSCULAR; INTRAVENOUS at 03:41

## 2023-10-14 RX ADMIN — DIPHENHYDRAMINE HYDROCHLORIDE 25 MG: 50 INJECTION INTRAMUSCULAR; INTRAVENOUS at 03:41

## 2023-10-14 RX ADMIN — SODIUM CHLORIDE 1000 ML: 9 INJECTION, SOLUTION INTRAVENOUS at 03:41

## 2023-10-14 ASSESSMENT — PAIN SCALES - GENERAL: PAINLEVEL_OUTOF10: 10

## 2023-10-14 ASSESSMENT — ENCOUNTER SYMPTOMS
BACK PAIN: 0
COLOR CHANGE: 0
SHORTNESS OF BREATH: 0
VOMITING: 1
TROUBLE SWALLOWING: 0
COUGH: 0
CHEST TIGHTNESS: 0
CONSTIPATION: 0
ABDOMINAL PAIN: 0
DIARRHEA: 0
NAUSEA: 1

## 2023-10-14 ASSESSMENT — PAIN - FUNCTIONAL ASSESSMENT: PAIN_FUNCTIONAL_ASSESSMENT: 0-10

## 2023-10-14 NOTE — ED NOTES
Pt comes to ED with c/o alcohol intoxication. Pt states taking five shots of alcohol last night, has had emesis four times since, no hematemesis, has headache. Pt denies chest pain, SOB, fever, diarrhea. Pt a/o x4, resting on stretcher, RR even and non labored, call light within reach.      Kirstie Arroyo, NAIF  10/14/23 1987

## 2023-10-14 NOTE — ED NOTES
Pt ambulated with steady gait. Pt states she is going to go to her mothers house just around the corner and is feeling much better.       August Lim RN  10/14/23 1614

## 2023-10-14 NOTE — ED NOTES
Report given to [facility]; all questions addressed.      Sangeetha Adkins RN  10/14/23 1189

## 2023-11-22 ENCOUNTER — HOSPITAL ENCOUNTER (OUTPATIENT)
Age: 28
Discharge: HOME | End: 2023-11-22
Payer: COMMERCIAL

## 2023-11-22 LAB
POTASSIUM SERPL-SCNC: 4 MMOL/L (ref 3.7–5.3)
TESTOST SERPL-MCNC: 396 NG/DL (ref 220–1000)

## 2023-11-22 PROCEDURE — 82670 ASSAY OF TOTAL ESTRADIOL: CPT

## 2023-11-22 PROCEDURE — 84403 ASSAY OF TOTAL TESTOSTERONE: CPT

## 2023-11-22 PROCEDURE — 84132 ASSAY OF SERUM POTASSIUM: CPT

## 2023-11-23 LAB — ESTRADIOL LEVEL: 29 PG/ML

## 2023-12-21 ENCOUNTER — HOSPITAL ENCOUNTER (EMERGENCY)
Age: 28
Discharge: HOME | End: 2023-12-21
Attending: EMERGENCY MEDICINE
Payer: COMMERCIAL

## 2023-12-21 VITALS
HEART RATE: 96 BPM | RESPIRATION RATE: 18 BRPM | TEMPERATURE: 99.6 F | DIASTOLIC BLOOD PRESSURE: 70 MMHG | SYSTOLIC BLOOD PRESSURE: 99 MMHG | BODY MASS INDEX: 20.72 KG/M2 | WEIGHT: 144.4 LBS | OXYGEN SATURATION: 98 %

## 2023-12-21 DIAGNOSIS — U07.1 COVID-19: Primary | ICD-10-CM

## 2023-12-21 PROCEDURE — 2580000003 HC RX 258

## 2023-12-21 PROCEDURE — 99284 EMERGENCY DEPT VISIT MOD MDM: CPT

## 2023-12-21 PROCEDURE — 6370000000 HC RX 637 (ALT 250 FOR IP)

## 2023-12-21 RX ORDER — ONDANSETRON 4 MG/1
4 TABLET, ORALLY DISINTEGRATING ORAL ONCE
Status: COMPLETED | OUTPATIENT
Start: 2023-12-21 | End: 2023-12-21

## 2023-12-21 RX ORDER — IBUPROFEN 800 MG/1
800 TABLET ORAL ONCE
Status: COMPLETED | OUTPATIENT
Start: 2023-12-21 | End: 2023-12-21

## 2023-12-21 RX ORDER — IBUPROFEN 600 MG/1
600 TABLET ORAL EVERY 6 HOURS PRN
Qty: 30 TABLET | Refills: 0 | Status: SHIPPED | OUTPATIENT
Start: 2023-12-21

## 2023-12-21 RX ORDER — 0.9 % SODIUM CHLORIDE 0.9 %
1000 INTRAVENOUS SOLUTION INTRAVENOUS ONCE
Status: COMPLETED | OUTPATIENT
Start: 2023-12-21 | End: 2023-12-21

## 2023-12-21 RX ORDER — ONDANSETRON 4 MG/1
4 TABLET, FILM COATED ORAL EVERY 8 HOURS PRN
Qty: 20 TABLET | Refills: 0 | Status: SHIPPED | OUTPATIENT
Start: 2023-12-21

## 2023-12-21 RX ADMIN — SODIUM CHLORIDE 1000 ML: 9 INJECTION, SOLUTION INTRAVENOUS at 13:53

## 2023-12-21 RX ADMIN — ONDANSETRON 4 MG: 4 TABLET, ORALLY DISINTEGRATING ORAL at 13:56

## 2023-12-21 RX ADMIN — IBUPROFEN 800 MG: 800 TABLET, FILM COATED ORAL at 13:55

## 2023-12-21 NOTE — ED TRIAGE NOTES
Pt arrives to ED after testing positive with COVID.  Pt now experiencing cough, emesis, and headache

## 2023-12-21 NOTE — DISCHARGE INSTRUCTIONS
You were seen today in the emergency department for your sore throat, body aches, congestion. We have evaluated you and determined that you likely have COVID-19. We now feel you are safe for discharge home. Please take 1 tablet of Zofran every 8 hours need for nausea. Please take 1 tablet Motrin every 6 hours as needed for pain. Please return to the emergency department immediately if develop any new or worsening concerns including chest pain, shortness of breath, abdominal pain, nausea, vomiting, diarrhea, weakness, loss consciousness, fever, chills, or any other concerns. Please call your PCP and schedule appointment within the next 24 to 48 hours for follow-up.

## 2024-02-16 ENCOUNTER — HOSPITAL ENCOUNTER (OUTPATIENT)
Age: 29
Discharge: HOME | End: 2024-02-16
Payer: COMMERCIAL

## 2024-02-16 LAB
ESTRADIOL LEVEL: 79 PG/ML (ref 27–52)
POTASSIUM SERPL-SCNC: 4 MMOL/L (ref 3.7–5.3)
TESTOST SERPL-MCNC: 347 NG/DL (ref 220–1000)

## 2024-02-16 PROCEDURE — 82670 ASSAY OF TOTAL ESTRADIOL: CPT

## 2024-02-16 PROCEDURE — 84132 ASSAY OF SERUM POTASSIUM: CPT

## 2024-02-16 PROCEDURE — 84403 ASSAY OF TOTAL TESTOSTERONE: CPT

## 2024-02-16 PROCEDURE — 36415 COLL VENOUS BLD VENIPUNCTURE: CPT

## 2024-02-29 ENCOUNTER — HOSPITAL ENCOUNTER (OUTPATIENT)
Age: 29
Discharge: HOME | End: 2024-02-29
Payer: COMMERCIAL

## 2024-02-29 LAB
ESTRADIOL LEVEL: 146 PG/ML (ref 27–52)
POTASSIUM SERPL-SCNC: 4.4 MMOL/L (ref 3.7–5.3)
TESTOST SERPL-MCNC: 16 NG/DL (ref 220–1000)

## 2024-02-29 PROCEDURE — 36415 COLL VENOUS BLD VENIPUNCTURE: CPT

## 2024-02-29 PROCEDURE — 84132 ASSAY OF SERUM POTASSIUM: CPT

## 2024-02-29 PROCEDURE — 84403 ASSAY OF TOTAL TESTOSTERONE: CPT

## 2024-02-29 PROCEDURE — 82670 ASSAY OF TOTAL ESTRADIOL: CPT

## 2024-04-25 ENCOUNTER — HOSPITAL ENCOUNTER (OUTPATIENT)
Age: 29
Discharge: HOME | End: 2024-04-25
Payer: COMMERCIAL

## 2024-04-25 LAB
ESTRADIOL LEVEL: 145 PG/ML (ref 27–52)
POTASSIUM SERPL-SCNC: 3.7 MMOL/L (ref 3.7–5.3)
TESTOST SERPL-MCNC: 59 NG/DL (ref 249–836)

## 2024-04-25 PROCEDURE — 82670 ASSAY OF TOTAL ESTRADIOL: CPT

## 2024-04-25 PROCEDURE — 36415 COLL VENOUS BLD VENIPUNCTURE: CPT

## 2024-04-25 PROCEDURE — 84132 ASSAY OF SERUM POTASSIUM: CPT

## 2024-04-25 PROCEDURE — 84403 ASSAY OF TOTAL TESTOSTERONE: CPT

## 2024-10-29 ENCOUNTER — HOSPITAL ENCOUNTER (OUTPATIENT)
Age: 29
Discharge: HOME | End: 2024-10-29

## 2024-10-29 LAB
ALBUMIN SERPL-MCNC: 4.5 G/DL (ref 3.5–5.2)
ALBUMIN/GLOB SERPL: 1.5 {RATIO} (ref 1–2.5)
ALP SERPL-CCNC: 70 U/L (ref 40–129)
ALT SERPL-CCNC: 8 U/L (ref 10–50)
ANION GAP SERPL CALCULATED.3IONS-SCNC: 13 MMOL/L (ref 9–16)
AST SERPL-CCNC: 27 U/L (ref 10–50)
BACTERIA URNS QL MICRO: NORMAL
BILIRUB SERPL-MCNC: 0.4 MG/DL (ref 0–1.2)
BILIRUB UR QL STRIP: NEGATIVE
BUN SERPL-MCNC: 9 MG/DL (ref 6–20)
CALCIUM SERPL-MCNC: 9.3 MG/DL (ref 8.6–10.4)
CASTS #/AREA URNS LPF: NORMAL /LPF (ref 0–8)
CHLORIDE SERPL-SCNC: 104 MMOL/L (ref 98–107)
CHOLEST SERPL-MCNC: 126 MG/DL (ref 0–199)
CHOLESTEROL/HDL RATIO: 3.2
CLARITY UR: CLEAR
CO2 SERPL-SCNC: 23 MMOL/L (ref 20–31)
COLOR UR: ABNORMAL
CREAT SERPL-MCNC: 1.2 MG/DL (ref 0.7–1.2)
EPI CELLS #/AREA URNS HPF: NORMAL /HPF (ref 0–5)
EST. AVERAGE GLUCOSE BLD GHB EST-MCNC: 100 MG/DL
GFR, ESTIMATED: 84 ML/MIN/1.73M2
GLUCOSE SERPL-MCNC: 89 MG/DL (ref 74–99)
GLUCOSE UR STRIP-MCNC: NEGATIVE MG/DL
HBA1C MFR BLD: 5.1 % (ref 4–6)
HCV AB SERPL QL IA: NONREACTIVE
HDLC SERPL-MCNC: 40 MG/DL
HGB UR QL STRIP.AUTO: NEGATIVE
KETONES UR STRIP-MCNC: ABNORMAL MG/DL
LDLC SERPL CALC-MCNC: 68 MG/DL (ref 0–100)
LEUKOCYTE ESTERASE UR QL STRIP: ABNORMAL
NITRITE UR QL STRIP: NEGATIVE
PH UR STRIP: 6.5 [PH] (ref 5–8)
POTASSIUM SERPL-SCNC: 3.4 MMOL/L (ref 3.7–5.3)
PROT SERPL-MCNC: 7.5 G/DL (ref 6.6–8.7)
PROT UR STRIP-MCNC: ABNORMAL MG/DL
RBC #/AREA URNS HPF: NORMAL /HPF (ref 0–4)
SODIUM SERPL-SCNC: 140 MMOL/L (ref 136–145)
SP GR UR STRIP: 1.03 (ref 1–1.03)
T PALLIDUM AB SER QL IA: NONREACTIVE
TRIGL SERPL-MCNC: 92 MG/DL
UROBILINOGEN UR STRIP-ACNC: NORMAL EU/DL (ref 0–1)
VLDLC SERPL CALC-MCNC: 18 MG/DL (ref 1–30)
WBC #/AREA URNS HPF: NORMAL /HPF (ref 0–5)

## 2024-10-29 PROCEDURE — 81001 URINALYSIS AUTO W/SCOPE: CPT

## 2024-10-29 PROCEDURE — 87536 HIV-1 QUANT&REVRSE TRNSCRPJ: CPT

## 2024-10-29 PROCEDURE — 87491 CHLMYD TRACH DNA AMP PROBE: CPT

## 2024-10-29 PROCEDURE — 86357 NK CELLS TOTAL COUNT: CPT

## 2024-10-29 PROCEDURE — 86780 TREPONEMA PALLIDUM: CPT

## 2024-10-29 PROCEDURE — 87591 N.GONORRHOEAE DNA AMP PROB: CPT

## 2024-10-29 PROCEDURE — 36415 COLL VENOUS BLD VENIPUNCTURE: CPT

## 2024-10-29 PROCEDURE — 86803 HEPATITIS C AB TEST: CPT

## 2024-10-29 PROCEDURE — 86355 B CELLS TOTAL COUNT: CPT

## 2024-10-29 PROCEDURE — 83036 HEMOGLOBIN GLYCOSYLATED A1C: CPT

## 2024-10-29 PROCEDURE — 86360 T CELL ABSOLUTE COUNT/RATIO: CPT

## 2024-10-29 PROCEDURE — 80053 COMPREHEN METABOLIC PANEL: CPT

## 2024-10-29 PROCEDURE — 80061 LIPID PANEL: CPT

## 2024-10-29 PROCEDURE — 86359 T CELLS TOTAL COUNT: CPT

## 2024-10-30 LAB
CHLAMYDIA DNA UR QL NAA+PROBE: NEGATIVE
N GONORRHOEA DNA UR QL NAA+PROBE: NEGATIVE
SPECIMEN DESCRIPTION: NORMAL

## 2024-10-31 LAB
% NK (CD56): 7 % (ref 6–29)
AB NK (CD56): 131 /UL (ref 90–600)
ABSOLUTE CD 3: 1572 /UL (ref 411–2061)
ABSOLUTE CD 8 (SUPP): 749 /UL (ref 282–999)
ABSOLUTE CD19 B CELL: 131 /UL (ref 71–567)
CD19 B CELL: 7 % (ref 5–25)
CD3 % TOTAL T CELLS: 84 % (ref 57–94)
CD3+CD4+ CELLS # BLD: 824 /UL (ref 309–1571)
CD3+CD4+ CELLS NFR BLD: 44 % (ref 27–64)
CD4:CD8: 1.1 (ref 0.6–2.8)
CD8 % SUPPRESSOR T CELL: 40 % (ref 17–48)
HIV1 RNA # SERPL NAA+PROBE: NOT DETECTED {COPIES}/ML
LYMPHOCYTES # BLD: 52 % (ref 24–44)
SPECIMEN SOURCE: NORMAL
WBC # BLD: 3.6 K/UL (ref 3.5–11)